# Patient Record
Sex: FEMALE | Race: WHITE | NOT HISPANIC OR LATINO | Employment: FULL TIME
[De-identification: names, ages, dates, MRNs, and addresses within clinical notes are randomized per-mention and may not be internally consistent; named-entity substitution may affect disease eponyms.]

---

## 2017-09-24 ENCOUNTER — HEALTH MAINTENANCE LETTER (OUTPATIENT)
Age: 57
End: 2017-09-24

## 2021-05-24 ENCOUNTER — RECORDS - HEALTHEAST (OUTPATIENT)
Dept: ADMINISTRATIVE | Facility: CLINIC | Age: 61
End: 2021-05-24

## 2021-05-30 ENCOUNTER — RECORDS - HEALTHEAST (OUTPATIENT)
Dept: ADMINISTRATIVE | Facility: CLINIC | Age: 61
End: 2021-05-30

## 2022-09-29 ENCOUNTER — APPOINTMENT (OUTPATIENT)
Dept: ULTRASOUND IMAGING | Facility: HOSPITAL | Age: 62
End: 2022-09-29
Attending: EMERGENCY MEDICINE
Payer: COMMERCIAL

## 2022-09-29 ENCOUNTER — HOSPITAL ENCOUNTER (OUTPATIENT)
Facility: HOSPITAL | Age: 62
Setting detail: OBSERVATION
Discharge: HOME | End: 2022-10-03
Attending: EMERGENCY MEDICINE | Admitting: INTERNAL MEDICINE
Payer: COMMERCIAL

## 2022-09-29 ENCOUNTER — APPOINTMENT (OUTPATIENT)
Dept: MRI IMAGING | Facility: HOSPITAL | Age: 62
End: 2022-09-29
Attending: EMERGENCY MEDICINE
Payer: COMMERCIAL

## 2022-09-29 DIAGNOSIS — S70.12XA HEMATOMA OF LEFT THIGH, INITIAL ENCOUNTER: ICD-10-CM

## 2022-09-29 LAB
ANION GAP SERPL CALCULATED.3IONS-SCNC: 13 MMOL/L (ref 7–15)
APTT PPP: 32 SECONDS (ref 22–38)
BASOPHILS # BLD AUTO: 0 10E3/UL (ref 0–0.2)
BASOPHILS NFR BLD AUTO: 0 %
BUN SERPL-MCNC: 6 MG/DL (ref 8–23)
CALCIUM SERPL-MCNC: 8.9 MG/DL (ref 8.8–10.2)
CHLORIDE SERPL-SCNC: 92 MMOL/L (ref 98–107)
CK SERPL-CCNC: 86 U/L (ref 26–192)
CREAT SERPL-MCNC: 0.43 MG/DL (ref 0.51–0.95)
CRP SERPL-MCNC: 6.3 MG/L
DEPRECATED HCO3 PLAS-SCNC: 25 MMOL/L (ref 22–29)
EOSINOPHIL # BLD AUTO: 0.1 10E3/UL (ref 0–0.7)
EOSINOPHIL NFR BLD AUTO: 1 %
ERYTHROCYTE [DISTWIDTH] IN BLOOD BY AUTOMATED COUNT: 12.3 % (ref 10–15)
GFR SERPL CREATININE-BSD FRML MDRD: >90 ML/MIN/1.73M2
GLUCOSE SERPL-MCNC: 97 MG/DL (ref 70–99)
HCT VFR BLD AUTO: 37.8 % (ref 35–47)
HGB BLD-MCNC: 12.5 G/DL (ref 11.7–15.7)
HGB BLD-MCNC: 13.2 G/DL (ref 11.7–15.7)
HOLD SPECIMEN: NORMAL
IMM GRANULOCYTES # BLD: 0.1 10E3/UL
IMM GRANULOCYTES NFR BLD: 1 %
INR PPP: 1.11 (ref 0.85–1.15)
LYMPHOCYTES # BLD AUTO: 1.7 10E3/UL (ref 0.8–5.3)
LYMPHOCYTES NFR BLD AUTO: 15 %
MCH RBC QN AUTO: 31.7 PG (ref 26.5–33)
MCHC RBC AUTO-ENTMCNC: 34.9 G/DL (ref 31.5–36.5)
MCV RBC AUTO: 91 FL (ref 78–100)
MONOCYTES # BLD AUTO: 0.7 10E3/UL (ref 0–1.3)
MONOCYTES NFR BLD AUTO: 7 %
NEUTROPHILS # BLD AUTO: 8.7 10E3/UL (ref 1.6–8.3)
NEUTROPHILS NFR BLD AUTO: 76 %
NRBC # BLD AUTO: 0 10E3/UL
NRBC BLD AUTO-RTO: 0 /100
PLATELET # BLD AUTO: 334 10E3/UL (ref 150–450)
POTASSIUM SERPL-SCNC: 3.8 MMOL/L (ref 3.4–5.3)
RBC # BLD AUTO: 4.17 10E6/UL (ref 3.8–5.2)
SODIUM SERPL-SCNC: 130 MMOL/L (ref 136–145)
WBC # BLD AUTO: 11.1 10E3/UL (ref 4–11)

## 2022-09-29 PROCEDURE — G0378 HOSPITAL OBSERVATION PER HR: HCPCS

## 2022-09-29 PROCEDURE — 82550 ASSAY OF CK (CPK): CPT | Performed by: EMERGENCY MEDICINE

## 2022-09-29 PROCEDURE — 36415 COLL VENOUS BLD VENIPUNCTURE: CPT | Performed by: EMERGENCY MEDICINE

## 2022-09-29 PROCEDURE — 99220 PR INITIAL OBSERVATION CARE,LEVEL III: CPT | Performed by: INTERNAL MEDICINE

## 2022-09-29 PROCEDURE — C9803 HOPD COVID-19 SPEC COLLECT: HCPCS

## 2022-09-29 PROCEDURE — 85018 HEMOGLOBIN: CPT | Performed by: EMERGENCY MEDICINE

## 2022-09-29 PROCEDURE — 85014 HEMATOCRIT: CPT | Performed by: EMERGENCY MEDICINE

## 2022-09-29 PROCEDURE — 80048 BASIC METABOLIC PNL TOTAL CA: CPT | Performed by: EMERGENCY MEDICINE

## 2022-09-29 PROCEDURE — U0003 INFECTIOUS AGENT DETECTION BY NUCLEIC ACID (DNA OR RNA); SEVERE ACUTE RESPIRATORY SYNDROME CORONAVIRUS 2 (SARS-COV-2) (CORONAVIRUS DISEASE [COVID-19]), AMPLIFIED PROBE TECHNIQUE, MAKING USE OF HIGH THROUGHPUT TECHNOLOGIES AS DESCRIBED BY CMS-2020-01-R: HCPCS | Performed by: EMERGENCY MEDICINE

## 2022-09-29 PROCEDURE — 73720 MRI LWR EXTREMITY W/O&W/DYE: CPT | Mod: LT

## 2022-09-29 PROCEDURE — 85730 THROMBOPLASTIN TIME PARTIAL: CPT | Performed by: EMERGENCY MEDICINE

## 2022-09-29 PROCEDURE — 255N000002 HC RX 255 OP 636: Performed by: EMERGENCY MEDICINE

## 2022-09-29 PROCEDURE — 93971 EXTREMITY STUDY: CPT | Mod: LT

## 2022-09-29 PROCEDURE — 86140 C-REACTIVE PROTEIN: CPT | Performed by: EMERGENCY MEDICINE

## 2022-09-29 PROCEDURE — 85610 PROTHROMBIN TIME: CPT | Performed by: EMERGENCY MEDICINE

## 2022-09-29 PROCEDURE — 250N000013 HC RX MED GY IP 250 OP 250 PS 637: Performed by: EMERGENCY MEDICINE

## 2022-09-29 PROCEDURE — 99285 EMERGENCY DEPT VISIT HI MDM: CPT | Mod: 25

## 2022-09-29 PROCEDURE — 250N000011 HC RX IP 250 OP 636: Performed by: EMERGENCY MEDICINE

## 2022-09-29 PROCEDURE — 96374 THER/PROPH/DIAG INJ IV PUSH: CPT | Mod: XU

## 2022-09-29 PROCEDURE — A9585 GADOBUTROL INJECTION: HCPCS | Performed by: EMERGENCY MEDICINE

## 2022-09-29 RX ORDER — ASPIRIN 81 MG/1
81 TABLET ORAL DAILY
COMMUNITY

## 2022-09-29 RX ORDER — AMOXICILLIN 250 MG
2 CAPSULE ORAL 2 TIMES DAILY
Status: DISCONTINUED | OUTPATIENT
Start: 2022-09-30 | End: 2022-10-03

## 2022-09-29 RX ORDER — AMOXICILLIN 250 MG
1 CAPSULE ORAL 2 TIMES DAILY
Status: DISCONTINUED | OUTPATIENT
Start: 2022-09-30 | End: 2022-10-03 | Stop reason: HOSPADM

## 2022-09-29 RX ORDER — HYDROMORPHONE HYDROCHLORIDE 1 MG/ML
0.2 INJECTION, SOLUTION INTRAMUSCULAR; INTRAVENOUS; SUBCUTANEOUS EVERY 4 HOURS PRN
Status: DISCONTINUED | OUTPATIENT
Start: 2022-09-29 | End: 2022-10-01

## 2022-09-29 RX ORDER — SODIUM CHLORIDE 9 MG/ML
INJECTION, SOLUTION INTRAVENOUS CONTINUOUS
Status: DISCONTINUED | OUTPATIENT
Start: 2022-09-30 | End: 2022-10-03

## 2022-09-29 RX ORDER — GADOBUTROL 604.72 MG/ML
7 INJECTION INTRAVENOUS ONCE
Status: COMPLETED | OUTPATIENT
Start: 2022-09-29 | End: 2022-09-29

## 2022-09-29 RX ORDER — OXYCODONE HYDROCHLORIDE 5 MG/1
5 TABLET ORAL ONCE
Status: COMPLETED | OUTPATIENT
Start: 2022-09-29 | End: 2022-09-29

## 2022-09-29 RX ORDER — MORPHINE SULFATE 4 MG/ML
4 INJECTION, SOLUTION INTRAMUSCULAR; INTRAVENOUS ONCE
Status: COMPLETED | OUTPATIENT
Start: 2022-09-29 | End: 2022-09-29

## 2022-09-29 RX ORDER — IBUPROFEN 200 MG
600 TABLET ORAL EVERY 8 HOURS PRN
Status: ON HOLD | COMMUNITY
End: 2022-10-03

## 2022-09-29 RX ORDER — OXYCODONE HYDROCHLORIDE 5 MG/1
5 TABLET ORAL EVERY 6 HOURS PRN
Status: DISCONTINUED | OUTPATIENT
Start: 2022-09-29 | End: 2022-10-03 | Stop reason: HOSPADM

## 2022-09-29 RX ORDER — ONDANSETRON 2 MG/ML
4 INJECTION INTRAMUSCULAR; INTRAVENOUS EVERY 6 HOURS PRN
Status: DISCONTINUED | OUTPATIENT
Start: 2022-09-29 | End: 2022-10-03

## 2022-09-29 RX ORDER — ACETAMINOPHEN 325 MG/1
975 TABLET ORAL EVERY 8 HOURS
Status: DISCONTINUED | OUTPATIENT
Start: 2022-09-30 | End: 2022-10-03 | Stop reason: HOSPADM

## 2022-09-29 RX ORDER — ONDANSETRON 4 MG/1
4 TABLET, ORALLY DISINTEGRATING ORAL EVERY 6 HOURS PRN
Status: DISCONTINUED | OUTPATIENT
Start: 2022-09-29 | End: 2022-10-03 | Stop reason: HOSPADM

## 2022-09-29 RX ORDER — DIAZEPAM 5 MG
5 TABLET ORAL ONCE
Status: COMPLETED | OUTPATIENT
Start: 2022-09-29 | End: 2022-09-29

## 2022-09-29 RX ADMIN — DIAZEPAM 5 MG: 5 TABLET ORAL at 21:04

## 2022-09-29 RX ADMIN — OXYCODONE HYDROCHLORIDE 5 MG: 5 TABLET ORAL at 20:22

## 2022-09-29 RX ADMIN — MORPHINE SULFATE 4 MG: 4 INJECTION INTRAVENOUS at 23:25

## 2022-09-29 RX ADMIN — GADOBUTROL 7 ML: 604.72 INJECTION INTRAVENOUS at 21:38

## 2022-09-29 NOTE — ED NOTES
"    ED Provider In Triage Note  [facility]  Encounter Date: Sep 29, 2022    Chief Complaint   Patient presents with     Leg Pain     Leg swelling         Leg Swelling         Use of Intrepreter: N/A    Brief HPI:   Tierney Pearson is a 62 year old female presenting to the Emergency Department with a chief complaint of left lateral thigh pain and swelling.     Pt is concerned for DVT. Denies h/o DVT.    Pain started yesterday and reports swelling and redness on left lateral thigh and redness spreading medially.    \"spider bite\" to this area recently.        Brief Physical Exam:  BP (!) 180/111   Pulse 88   Temp 97.7  F (36.5  C) (Temporal)   Resp 20   Ht 1.702 m (5' 7\")   Wt 65.8 kg (145 lb)   SpO2 100%   BMI 22.71 kg/m    General: Non-toxic appearing  HEENT: Atraumatic  Resp: No respiratory distress  Abdomen: Non-peritoneal  Neuro: Alert, oriented, answers questions appropriately  Psych: Behavior appropriate  Musculoskeletal: unable to visualize area in triage room (clothing too tight) but tenderness on lateral thigh      Plan Initiated in Triage:  Orders Placed This Encounter     [place] Draw     Basic metabolic panel     CRP inflammation     Unable to examine due to patient tight clothing in triage but suspect cellulitis based on history. Less likely DVT due to lateral thigh and not medially on report.      PIT Dispo:   Return to lobby while awaiting workup and ED bed availability          Adriana Gayle MD on 9/29/2022 at 5:47 PM        Patient was evaluated by the Physician in Triage due to a limitation of available rooms in the Emergency Department. A plan of care was discussed based on the information obtained on the initial evaluation and patient was counseled to return back to the Emergency Department lobby after this initial evalutaiton until results were obtained or a room became available in the Emergency Department. Patient was counseled not to leave prior to " receiving the results of their workup.            Adriana Gayle MD  09/29/22 8621

## 2022-09-29 NOTE — ED TRIAGE NOTES
Pt reports redness and swelling to L lateral thigh that started yesterday.  Today much more painful and swollen. Pt denies hx of blood clots, denies any long trips or periods of inactivity.     Triage Assessment     Row Name 09/29/22 0419       Triage Assessment (Adult)    Airway WDL WDL       Respiratory WDL    Respiratory WDL WDL       Skin Circulation/Temperature WDL    Skin Circulation/Temperature WDL WDL       Cardiac WDL    Cardiac WDL WDL       Peripheral/Neurovascular WDL    Peripheral Neurovascular WDL WDL       Cognitive/Neuro/Behavioral WDL    Cognitive/Neuro/Behavioral WDL WDL

## 2022-09-29 NOTE — LETTER
[address] [address]  Phone: [phone]  Fax: [phone]    October 3, 2022        Tierney Pearson  [address] [place] [address]    Pt was admitted to the hospital @  [facility] with medical condition between 9/29 and 10/3       To whom it may concern:    RE: Tierney Pearson    Patient may return to work with   {No Restrictions )  Please contact me for questions or concerns.      Sincerely,    Isaac Gonzalez MD     No name on file.

## 2022-09-30 ENCOUNTER — APPOINTMENT (OUTPATIENT)
Dept: CARDIOLOGY | Facility: HOSPITAL | Age: 62
End: 2022-09-30
Attending: INTERNAL MEDICINE
Payer: COMMERCIAL

## 2022-09-30 ENCOUNTER — APPOINTMENT (OUTPATIENT)
Dept: PHYSICAL THERAPY | Facility: HOSPITAL | Age: 62
End: 2022-09-30
Attending: INTERNAL MEDICINE
Payer: COMMERCIAL

## 2022-09-30 LAB
ALBUMIN SERPL BCG-MCNC: 4.1 G/DL (ref 3.5–5.2)
ALP SERPL-CCNC: 62 U/L (ref 35–104)
ALT SERPL W P-5'-P-CCNC: 13 U/L (ref 10–35)
AMPHETAMINES UR QL SCN: ABNORMAL
ANION GAP SERPL CALCULATED.3IONS-SCNC: 11 MMOL/L (ref 7–15)
AST SERPL W P-5'-P-CCNC: 21 U/L (ref 10–35)
BARBITURATES UR QL SCN: ABNORMAL
BENZODIAZ UR QL SCN: ABNORMAL
BILIRUB SERPL-MCNC: 1.1 MG/DL
BUN SERPL-MCNC: 4.5 MG/DL (ref 8–23)
BZE UR QL SCN: ABNORMAL
CALCIUM SERPL-MCNC: 8.6 MG/DL (ref 8.8–10.2)
CANNABINOIDS UR QL SCN: ABNORMAL
CHLORIDE SERPL-SCNC: 93 MMOL/L (ref 98–107)
CREAT SERPL-MCNC: 0.36 MG/DL (ref 0.51–0.95)
DEPRECATED HCO3 PLAS-SCNC: 24 MMOL/L (ref 22–29)
ERYTHROCYTE [DISTWIDTH] IN BLOOD BY AUTOMATED COUNT: 12.4 % (ref 10–15)
GFR SERPL CREATININE-BSD FRML MDRD: >90 ML/MIN/1.73M2
GLUCOSE SERPL-MCNC: 99 MG/DL (ref 70–99)
HCT VFR BLD AUTO: 34.1 % (ref 35–47)
HGB BLD-MCNC: 12.3 G/DL (ref 11.7–15.7)
MCH RBC QN AUTO: 32.8 PG (ref 26.5–33)
MCHC RBC AUTO-ENTMCNC: 36.1 G/DL (ref 31.5–36.5)
MCV RBC AUTO: 91 FL (ref 78–100)
OPIATES UR QL SCN: ABNORMAL
PCP QUAL URINE (ROCHE): ABNORMAL
PLATELET # BLD AUTO: 335 10E3/UL (ref 150–450)
POTASSIUM SERPL-SCNC: 3.3 MMOL/L (ref 3.4–5.3)
PROT SERPL-MCNC: 6.7 G/DL (ref 6.4–8.3)
RBC # BLD AUTO: 3.75 10E6/UL (ref 3.8–5.2)
SARS-COV-2 RNA RESP QL NAA+PROBE: NEGATIVE
SODIUM SERPL-SCNC: 128 MMOL/L (ref 136–145)
WBC # BLD AUTO: 9.5 10E3/UL (ref 4–11)

## 2022-09-30 PROCEDURE — G0378 HOSPITAL OBSERVATION PER HR: HCPCS

## 2022-09-30 PROCEDURE — 96361 HYDRATE IV INFUSION ADD-ON: CPT

## 2022-09-30 PROCEDURE — 258N000003 HC RX IP 258 OP 636: Performed by: INTERNAL MEDICINE

## 2022-09-30 PROCEDURE — 80307 DRUG TEST PRSMV CHEM ANLYZR: CPT | Performed by: INTERNAL MEDICINE

## 2022-09-30 PROCEDURE — 250N000013 HC RX MED GY IP 250 OP 250 PS 637: Performed by: INTERNAL MEDICINE

## 2022-09-30 PROCEDURE — 97116 GAIT TRAINING THERAPY: CPT | Mod: GP

## 2022-09-30 PROCEDURE — 93306 TTE W/DOPPLER COMPLETE: CPT | Mod: 26 | Performed by: INTERNAL MEDICINE

## 2022-09-30 PROCEDURE — 96375 TX/PRO/DX INJ NEW DRUG ADDON: CPT

## 2022-09-30 PROCEDURE — 97530 THERAPEUTIC ACTIVITIES: CPT | Mod: GP

## 2022-09-30 PROCEDURE — 97161 PT EVAL LOW COMPLEX 20 MIN: CPT | Mod: GP

## 2022-09-30 PROCEDURE — 80053 COMPREHEN METABOLIC PANEL: CPT | Performed by: INTERNAL MEDICINE

## 2022-09-30 PROCEDURE — 250N000011 HC RX IP 250 OP 636: Performed by: INTERNAL MEDICINE

## 2022-09-30 PROCEDURE — 93306 TTE W/DOPPLER COMPLETE: CPT

## 2022-09-30 PROCEDURE — 82040 ASSAY OF SERUM ALBUMIN: CPT | Performed by: INTERNAL MEDICINE

## 2022-09-30 PROCEDURE — 96376 TX/PRO/DX INJ SAME DRUG ADON: CPT

## 2022-09-30 PROCEDURE — 36415 COLL VENOUS BLD VENIPUNCTURE: CPT | Performed by: INTERNAL MEDICINE

## 2022-09-30 PROCEDURE — 85027 COMPLETE CBC AUTOMATED: CPT | Performed by: INTERNAL MEDICINE

## 2022-09-30 PROCEDURE — 99225 PR SUBSEQUENT OBSERVATION CARE,LEVEL II: CPT | Performed by: INTERNAL MEDICINE

## 2022-09-30 RX ORDER — AMLODIPINE BESYLATE 2.5 MG/1
2.5 TABLET ORAL DAILY
Status: DISCONTINUED | OUTPATIENT
Start: 2022-09-30 | End: 2022-10-03 | Stop reason: HOSPADM

## 2022-09-30 RX ORDER — HYDROXYZINE HYDROCHLORIDE 25 MG/1
25 TABLET, FILM COATED ORAL EVERY 6 HOURS PRN
Status: DISCONTINUED | OUTPATIENT
Start: 2022-09-30 | End: 2022-10-03

## 2022-09-30 RX ORDER — NICOTINE 21 MG/24HR
1 PATCH, TRANSDERMAL 24 HOURS TRANSDERMAL DAILY PRN
Status: DISCONTINUED | OUTPATIENT
Start: 2022-09-30 | End: 2022-10-03 | Stop reason: HOSPADM

## 2022-09-30 RX ORDER — HYDRALAZINE HYDROCHLORIDE 20 MG/ML
10 INJECTION INTRAMUSCULAR; INTRAVENOUS EVERY 6 HOURS PRN
Status: DISCONTINUED | OUTPATIENT
Start: 2022-09-30 | End: 2022-10-03

## 2022-09-30 RX ADMIN — ACETAMINOPHEN 975 MG: 325 TABLET, FILM COATED ORAL at 07:55

## 2022-09-30 RX ADMIN — OXYCODONE HYDROCHLORIDE 5 MG: 5 TABLET ORAL at 15:35

## 2022-09-30 RX ADMIN — HYDROMORPHONE HYDROCHLORIDE 0.2 MG: 1 INJECTION, SOLUTION INTRAMUSCULAR; INTRAVENOUS; SUBCUTANEOUS at 05:18

## 2022-09-30 RX ADMIN — ACETAMINOPHEN 975 MG: 325 TABLET, FILM COATED ORAL at 01:31

## 2022-09-30 RX ADMIN — HYDRALAZINE HYDROCHLORIDE 10 MG: 20 INJECTION, SOLUTION INTRAMUSCULAR; INTRAVENOUS at 01:31

## 2022-09-30 RX ADMIN — SENNOSIDES AND DOCUSATE SODIUM 1 TABLET: 50; 8.6 TABLET ORAL at 07:49

## 2022-09-30 RX ADMIN — SODIUM CHLORIDE: 9 INJECTION, SOLUTION INTRAVENOUS at 01:31

## 2022-09-30 RX ADMIN — ACETAMINOPHEN 975 MG: 325 TABLET, FILM COATED ORAL at 16:00

## 2022-09-30 RX ADMIN — SENNOSIDES AND DOCUSATE SODIUM 1 TABLET: 50; 8.6 TABLET ORAL at 20:41

## 2022-09-30 RX ADMIN — HYDROMORPHONE HYDROCHLORIDE 0.2 MG: 1 INJECTION, SOLUTION INTRAMUSCULAR; INTRAVENOUS; SUBCUTANEOUS at 12:37

## 2022-09-30 RX ADMIN — OXYCODONE HYDROCHLORIDE 5 MG: 5 TABLET ORAL at 07:45

## 2022-09-30 RX ADMIN — HYDROMORPHONE HYDROCHLORIDE 0.2 MG: 1 INJECTION, SOLUTION INTRAMUSCULAR; INTRAVENOUS; SUBCUTANEOUS at 20:41

## 2022-09-30 RX ADMIN — AMLODIPINE BESYLATE 2.5 MG: 2.5 TABLET ORAL at 07:48

## 2022-09-30 ASSESSMENT — ACTIVITIES OF DAILY LIVING (ADL)
ADLS_ACUITY_SCORE: 35
ADLS_ACUITY_SCORE: 36
ADLS_ACUITY_SCORE: 39
ADLS_ACUITY_SCORE: 35
ADLS_ACUITY_SCORE: 39
ADLS_ACUITY_SCORE: 37
ADLS_ACUITY_SCORE: 37
ADLS_ACUITY_SCORE: 35
DEPENDENT_IADLS:: INDEPENDENT
ADLS_ACUITY_SCORE: 39
ADLS_ACUITY_SCORE: 35
ADLS_ACUITY_SCORE: 37
ADLS_ACUITY_SCORE: 35

## 2022-09-30 NOTE — PROGRESS NOTES
[facility]      OUTPATIENT PHYSICAL THERAPY EVALUATION  PLAN OF TREATMENT FOR OUTPATIENT REHABILITATION  (COMPLETE FOR INITIAL CLAIMS ONLY)  Patient's Last Name, First Name, M.I.  YOB: 1960  Tierney Pearson                        Provider's Name  [facility] Medical Record No.  [id number]                               Onset Date:  09/29/22   Start of Care Date:         Type:     _X_PT   ___OT   ___SLP Medical Diagnosis:                           PT Diagnosis:      Visits from SOC:  1   _________________________________________________________________________________  Plan of Treatment/Functional Goals    Planned Interventions:       Goals: See Physical Therapy Goals on Care Plan in [employer] electronic health record.    Therapy Frequency: Daily  Predicted Duration of Therapy Intervention: 10/07/22  _________________________________________________________________________________    I CERTIFY THE NEED FOR THESE SERVICES FURNISHED UNDER        THIS PLAN OF TREATMENT AND WHILE UNDER MY CARE     (Physician co-signature of this document indicates review and certification of the therapy plan).              Certification date from: (P) 09/30/22,      Referring Physician: Dr Conrad            Initial Assessment        See Physical Therapy evaluation dated   in Epic electronic health record.

## 2022-09-30 NOTE — PROGRESS NOTES
[facility]    Medicine Progress Note - Hospitalist Service    Date of Admission:  9/29/2022    Assessment & Plan          62 year old female with past medical history of hypertension, alcohol and smoking abuse who presents to the ED for the evaluation of leg swelling.  She was admitted with,     #Acute left thigh hematoma  -- Etiology is unclear, patient is not sure about trauma.  -- MRI of leg shows a large mass in the vastus intermedius muscle suggestive of acute hematoma with active extravasation  -- IR provider was contacted by ER physician and not recommending any intervention at this point, if hemoglobin is dropping then would order CTA and notify IR.  -- Discontinue home aspirin  -- No NSAIDs  -- Continue to monitor hemoglobin     #Left leg swelling  -- Secondary to hematoma  -- Negative ultrasound for DVT  -- Orthopedic was contacted by ER provider regarding concern for compartment syndrome who recommended continued observation and check CK level and use ice packs.     #Accelerated hypertension  -- Patient has history of hypertension but not currently taking any medication  -- Increase blood pressure in the ER, probably secondary to pain  -- Start Norvasc   -- Check echo  -- Add hydralazine IV as needed  -- Continue to monitor blood pressure     #Left leg pain  -- Secondary to acute hematoma  -- Avoid NSAIDs because of bleeding  -- Start scheduled Tylenol 3 times daily  -- Ice packing  -- Add oxycodone as needed  -- PT evaluation     #Hyponatremia  -- Probably secondary to poor oral intake  -- Gentle IV hydration overnight  -- Continue to monitor sodium level     #Smoking dependence  -- Discussed with patient to quit  -- Nicotine patch as needed     #Alcohol abuse  -- Patient drinks 3-4 beers every day  -- No sign or symptom of alcohol withdrawal  -- Continue to monitor closely        Diet: Regular Diet Adult    DVT Prophylaxis: Pneumatic Compression Devices  Teixeira Catheter: Not  present  Central Lines: None  Cardiac Monitoring: None  Code Status: Full Code      Disposition Plan      Expected Discharge Date: 09/30/2022                The patient's care was discussed with the Bedside Nurse and Patient.    Randell Vasquez DO  Hospitalist Service  [facility]  Securely message with the Vocera Web Console (learn more here)  Text page via [employer] Paging/Directory         Clinically Significant Risk Factors Present on Admission         # Hyponatremia: Na = 128 mmol/L (Ref range: 136 - 145 mmol/L) on admission, will monitor as appropriate                  ______________________________________________________________________    Interval History   Patient is still reporting persistent pain and swelling in the left thigh.  Patient will talk to  about readiness to come home.  Expressed to patient she is welcomed to stay overnight if not strong enough to return home.  Continue to monitor sxs compartment syndrome.  Continue to monitor CBC for acute blood loss.    Data reviewed today: I reviewed all medications, new labs and imaging results over the last 24 hours. I personally reviewed no images or EKG's today.    Physical Exam   Vital Signs: Temp: 97.8  F (36.6  C) Temp src: Oral BP: (!) 165/87 Pulse: 98   Resp: 18 SpO2: 95 % O2 Device: None (Room air)    Weight: 145 lbs 0 oz     GENERAL: Alert and oriented x 3; no acute distress; well-nourished.  HEENT: Normocephalic; atraumatic; PERRLA; MMM.  CV: RRR; normal S1, S2; no rubs, murmurs, or gallops.  RESP: Lung fields clear to aucultation B/L; no wheezing or crepitations.  GI: Abdomen is soft, nontender, nondistended; no organomegaly; normal bowel sounds.  : Deferred genital examination.   MSK: Left thigh has increased circumference as compared to right.  DERM: Skin is intact; no rash, lesions, or skin breakdown.  NEURO: No focal deficits appreciated; strength & sensorium are grossly intact.  PSYCH: No active hallucinations;  affect, insight appear within normal limits.    Data   Recent Labs   Lab 09/30/22  1050 09/29/22  2333 09/29/22  2235 09/29/22  1756   WBC 9.5  --   --  11.1*   HGB 12.3 12.5  --  13.2   MCV 91  --   --  91     --   --  334   INR  --   --  1.11  --    *  --   --  130*   POTASSIUM 3.3*  --   --  3.8   CHLORIDE 93*  --   --  92*   CO2 24  --   --  25   BUN 4.5*  --   --  6.0*   CR 0.36*  --   --  0.43*   ANIONGAP 11  --   --  13   NAEEM 8.6*  --   --  8.9   GLC 99  --   --  97   ALBUMIN 4.1  --   --   --    PROTTOTAL 6.7  --   --   --    BILITOTAL 1.1  --   --   --    ALKPHOS 62  --   --   --    ALT 13  --   --   --    AST 21  --   --   --      Recent Results (from the past 24 hour(s))   US Lower Extremity Venous Duplex Left    Narrative    EXAM: US LOWER EXTREMITY VENOUS DUPLEX LEFT  LOCATION: [facility]  DATE/TIME: 9/29/2022 8:08 PM    INDICATION: Lower extremity swelling.  COMPARISON: 02/25/2009.  TECHNIQUE: Venous Duplex ultrasound of the left lower extremity with and without compression, augmentation and duplex. Color flow and spectral Doppler with waveform analysis performed.    FINDINGS: Exam includes the common femoral, femoral, popliteal, and contralateral common femoral veins as well as segmentally visualized deep calf veins and greater saphenous vein.     LEFT: No deep vein thrombosis. No superficial thrombophlebitis.     There is a heterogeneous, encapsulated mass within the deep soft tissues of the lateral left thigh, patient's site of pain. There is a scant amount of detectable internal vascularity. Several small fluid fluid levels are additionally noted. This mass   measures approximately 7.8 x 6.8 x 3.8 cm.      Impression    IMPRESSION:  1.  No deep venous thrombosis in the left lower extremity.  2.  Indeterminate, heterogeneous mass within the deep soft tissues of the lateral left thigh, with scant detectable internal vascularity. A hematoma is favored, though a  neoplastic lesion cannot be excluded, given the presence of some detectable internal   vascularity. MR imaging of the left thigh, with and without intravenous contrast, is recommended for definitive characterization.   MR Femur Thigh Left wo & w Contrast    Narrative    EXAM: MR FEMUR THIGH LEFT W/O and W CONTRAST  LOCATION: [facility]  DATE/TIME: 9/29/2022 9:54 PM    INDICATION: Thigh pain and swelling.  COMPARISON: 9/29/2022 ultrasound.  TECHNIQUE: Routine. Additional postgadolinium T1 sequences were obtained.  IV CONTRAST: 7 mL Gadavist    FINDINGS:     JOINTS AND BONES:  -No fracture or bone contusion. Normal articular cartilage. No effusion. No marrow replacing lesion.    TENDONS:  -No tendon tear, tendinopathy, or tenosynovitis.    MUSCLES AND SOFT TISSUES:  -There is a large, heterogeneous signal intensity mass centered in the midportion of the thigh, within the vastus intermedius muscle with extensive surrounding soft tissue edema throughout the vastus musculature with anterior displacement of the rectus   femoris and some associated fascial edema involving the deep portion of the sartorius as well as the anterior margins of the short adductors. There is a somewhat linear area of increased signal intensity along the lateral margin of this collection; which   likely represents an area of active extravasation in the setting of an underlying hematoma rather than true internal enhancement as seen on series 8, image 36. This collection demonstrates some areas of mildly increased T1 signal suggesting it   represents hematoma. Overall, the collection measures approximately 6.0 cm transverse by 4.5 cm AP by approximately 7.5 cm craniocaudal. The associated surrounding edema extends cephalad to almost the level of the hip caudally to almost the level of the   knee. No muscle atrophy.      Impression    IMPRESSION:  1.  Large mass-like area in the midportion of the left thigh centered within  the vastus intermedius muscle most suggestive of an area of acute hematoma including an area of presumed active extravasation along the lateral margin of the mass. There is   extensive surrounding intra and perimuscular/fascial edema extending nearly the entire length of the thigh. The extent of the soft tissue changes could obscure an underlying small mass that has bled and follow-up MRI would be helpful.   Echocardiogram Complete    Narrative    [id number]  [id number]  [id number]  [id number]^ISIS^[place] [address]     Name: NILE RAMOS  MRN: [id number]  : 1960  Study Date: 2022 07:58 AM  Age: 62 yrs  Gender: Female  Patient Location: [facility]  Reason For Study: Hypertension (HTN)  Ordering Physician: MACY MARINELLI  Performed By: CM     BSA: 1.8 m2  Height: 67 in  Weight: 145 lb  HR: 71  ______________________________________________________________________________  Procedure  Complete Echo Adult.  ______________________________________________________________________________  Interpretation Summary     1. Normal left ventricular size and systolic performance with a visually  estimated ejection fraction of 60-65%.  2. No significant valvular heart disease is identified on this study.  3. Normal right ventricular size and systolic performance.  ______________________________________________________________________________  Left ventricle:  Normal left ventricular size and systolic performance with a visually  estimated ejection fraction of 60-65%. There is normal regional wall motion.  There is borderline increase in left ventricular wall thickness.     Assessment of LV Diastolic Function: The cumulative findings are indeterminate  in the evaluation of diastolic function [The septal e' velocity is < 7 cm/s &  lateral e' velocity is < 10 cm/s. The average E/e' is >14. The TR velocity  cannot be determined due to insufficient tricuspid insufficiency signal.  Left  atrial volume index is less than 34 mL/mÂ ].     Right ventricle:  Normal right ventricular size and systolic performance.     Left atrium:  The left atrium is of normal size.     Right atrium:  The right atrium is of normal size.     IVC:  The IVC is of normal caliber.     Aortic valve:  The aortic valve is comprised of three cusps. No significant aortic stenosis  or aortic insufficiency is detected on this study.     Mitral valve:  The mitral valve appears morphologically normal. There is trace mitral  insufficiency.     Tricuspid valve:  The tricuspid valve is grossly morphologically normal. There is trace  tricuspid insufficiency.     Pulmonic valve:  The pulmonic valve is grossly morphologically normal.     Thoracic aorta:  There is borderline enlargement of the proximal ascending aorta.     Pericardium:  There is no significant pericardial effusion.  ______________________________________________________________________________  ______________________________________________________________________________  MMode/2D Measurements & Calculations  IVSd: 1.2 cm  LVIDd: 5.2 cm  LVIDs: 3.9 cm  LVPWd: 1.0 cm  FS: 25.8 %  LV mass(C)d: 229.3 grams  LV mass(C)dI: 130.0 grams/m2  Ao root diam: 3.2 cm  LA dimension: 3.7 cm  asc Aorta Diam: 3.8 cm  LA/Ao: 1.2  LVOT diam: 2.4 cm  LVOT area: 4.6 cm2     LA Volume Indexed (AL/bp): 31.4 ml/m2  RWT: 0.40     Doppler Measurements & Calculations  MV E max alex: 81.4 cm/sec  MV A max alex: 140.8 cm/sec  MV E/A: 0.58  MV max P.3 mmHg  MV mean PG: 3.5 mmHg  MV V2 VTI: 36.5 cm  MVA(VTI): 3.0 cm2  MV dec slope: 248.6 cm/sec2  MV dec time: 0.33 sec  Ao V2 max: 142.5 cm/sec  Ao max P.0 mmHg  Ao V2 mean: 98.5 cm/sec  Ao mean P.5 mmHg  Ao V2 VTI: 32.6 cm  JOÃO(I,D): 3.4 cm2  JOÃO(V,D): 3.3 cm2  LV V1 max P.3 mmHg  LV V1 max: 103.4 cm/sec  LV V1 VTI: 24.1 cm  SV(LVOT): 110.6 ml  SI(LVOT): 62.7 ml/m2  PA acc time: 0.10 sec  AV Alex Ratio (DI): 0.73  JOÃO Index (cm2/m2):  1.9  E/E' avg: 15.6  Lateral E/e': 13.4  Medial E/e': 17.8     ______________________________________________________________________________  Report approved by: Kindra Walker 09/30/2022 09:01 AM           Medications     sodium chloride Stopped (09/30/22 0712)       acetaminophen  975 mg Oral Q8H     amLODIPine  2.5 mg Oral Daily     nicotine   Transdermal Q8H     senna-docusate  1 tablet Oral BID    Or     senna-docusate  2 tablet Oral BID

## 2022-09-30 NOTE — ED PROVIDER NOTES
EMERGENCY DEPARTMENT ENCOUNTER      NAME: Tierney Pearson  AGE: 62 year old female  YOB: 1960  MRN: [id number]  EVALUATION DATE & TIME: No admission date for patient encounter.    PCP: Erick Ferro    ED PROVIDER: Nichole Mc MD    Chief Complaint   Patient presents with     Leg Pain     Leg swelling         Leg Swelling         FINAL IMPRESSION:  1. Hematoma of left thigh, initial encounter          ED COURSE & MEDICAL DECISION MAKING:    Pertinent Labs & Imaging studies reviewed. (See chart for details)  62 year old female with history of HTN who presents to the Emergency Department for evaluation of atraumatic left leg pain primarily within the left thigh that started yesterday.  On exam the leg is edematous, but no erythema swelling or warmth to suspect a cellulitis.  Concern for DVT.    CBC BMP and CRP notable only for CRP elevated at 6.3.  Duplex ultrasound negative for DVT but does show either hematoma or mass within the soft tissue of the thigh, recommend MRI.  MRI was obtained and shows hematoma with active gadolinium extravasation.  Patient still quite uncomfortable after oral oxycodone, but unfortunately would not be able able to give her any IV narcotics because she has been out in triage to the boarding crisis.  I do have some morphine ordered.  Due to the active extravasation Case was discussed with IR.  They did not feel that this was something that could be embolized, and could not trust an MRI.  Recommend repeating hemoglobin and if there is a hemoglobin drop obtaining a CTA and contacting them again.  Case was discussed with orthopedic surgery given concern for pain and sizable hematoma about potential evolving compartment syndrome.  A CK for what that is worth is normal at 86.  She certainly does have pain but is not necessarily out of proportion to her exam.  Ortho recommends continuing observation, ice and elevation.  Coags added on, unremarkable.  Platelets are normal.  Again  she is not anticoagulated or on any aspirin.  Will admit for serial hemoglobin, ice elevation and observation.    7:20 PM I introduced myself to the patient, obtained patient history, performed a physical exam, and discussed plan for ED workup including potential diagnostic laboratory/imaging studies and interventions.    8:45 PM I rechecked the patient and updated them on laboratory and imaging results.    ED Course as of 09/30/22 0022   Thu Sep 29, 2022   1917 WBC(!): 11.1   1917 CRP Inflammation(!): 6.30   2218 Spoke w rad - hematoma seen on MRI   2244 Spoke w Dr. James, ortho   2304 Spoke w Dr. Ko from body IR         At the conclusion of the encounter I discussed the results of all of the tests and the disposition. The questions were answered. The patient or family acknowledged understanding and was agreeable with the care plan.    CONSULTS:  [facility] IR  Orthopedic surgery    MEDICATIONS GIVEN IN THE EMERGENCY:  Medications   melatonin tablet 1 mg (has no administration in time range)   ondansetron (ZOFRAN ODT) ODT tab 4 mg (has no administration in time range)     Or   ondansetron (ZOFRAN) injection 4 mg (has no administration in time range)   acetaminophen (TYLENOL) tablet 975 mg (has no administration in time range)   senna-docusate (SENOKOT-S/PERICOLACE) 8.6-50 MG per tablet 1 tablet (has no administration in time range)     Or   senna-docusate (SENOKOT-S/PERICOLACE) 8.6-50 MG per tablet 2 tablet (has no administration in time range)   sodium chloride 0.9% infusion (has no administration in time range)   HYDROmorphone (PF) (DILAUDID) injection 0.2 mg (has no administration in time range)   oxyCODONE (ROXICODONE) tablet 5 mg (has no administration in time range)   oxyCODONE (ROXICODONE) tablet 5 mg (5 mg Oral Given 9/29/22 2022)   diazepam (VALIUM) tablet 5 mg (5 mg Oral Given 9/29/22 2104)   gadobutrol (GADAVIST) injection 7 mL (7 mLs Intravenous Given 9/29/22 2138)   morphine (PF) injection 4 mg (4 mg  Intravenous Given 9/29/22 6606)       NEW PRESCRIPTIONS STARTED AT TODAY'S ER VISIT  New Prescriptions    No medications on file          =================================================================    HPI    Patient information was obtained from: Patient     Use of Intrepreter: N/A        Tierney Pearson is a 62 year old female with pertinent medical history of hypertension who presents to the ED via wheelchair for the evaluation of leg swelling.     The patient started having left leg swelling yesterday and said it got worse today. She said it came on suddenly. She took 3 baby Asprin for her pain and said it helped.     She denies any history of blood clots, recent travel, surgery immobilization, and any new shortness of breath or chest pain. She is not taking any daily medication and has no other health problems.       REVIEW OF SYSTEMS  Constitutional:  Denies fever, chills, weight loss or weakness  Respiratory: No SOB, wheeze or cough  Cardiovascular:  No CP, palpitations  GI:  Denies abdominal pain, nausea, vomiting, diarrhea  Musculoskeletal:  Denies any new muscle/joint pain, or loss of function. Positive for left leg swelling.   Skin:  Denies rash, pallor  Neurologic:  Denies headache, focal weakness or sensory changes  All other systems negative unless noted in HPI.      PAST MEDICAL HISTORY:  Past Medical History:   Diagnosis Date     Hypertension        PAST SURGICAL HISTORY:  Past Surgical History:   Procedure Laterality Date     NO HISTORY OF SURGERY         CURRENT MEDICATIONS:    Prior to Admission Medications   Prescriptions Last Dose Informant Patient Reported? Taking?   Loratadine-Pseudoephedrine (CLARITIN-D 12 HOUR PO) Past Month at PRN  Yes Yes   Sig: Take 1 tablet by mouth daily as needed   aspirin 81 MG EC tablet 9/29/2022 at x6 tablets spread throughout the day  Yes Yes   Sig: Take 81 mg by mouth daily   ibuprofen (ADVIL/MOTRIN) 200 MG tablet Past Month at PRN  Yes Yes   Sig: Take 600 mg  "by mouth every 8 hours as needed for mild pain      Facility-Administered Medications: None       ALLERGIES:  Allergies   Allergen Reactions     Ciprofloxacin      Rash and hives.     Seasonal Allergies      Polymyxin B Hives and Rash       FAMILY HISTORY:  Family History   Problem Relation Age of Onset     Breast Cancer Mother      Heart Disease Father         issues     Breast Cancer Sister        SOCIAL HISTORY:  Social History     Tobacco Use     Smoking status: Former Smoker     Packs/day: 0.20     Years: 30.00     Pack years: 6.00     Types: Cigarettes     Quit date: 1999     Years since quittin.7     Smokeless tobacco: Never Used   Substance Use Topics     Alcohol use: Yes     Comment: Occasional on the weekends - 12 pack     Drug use: No        VITALS:  Patient Vitals for the past 24 hrs:   BP Temp Temp src Pulse Resp SpO2 Height Weight   22 2330 -- -- -- 72 -- 100 % -- --   22 2325 (!) 257/128 -- -- 72 -- 100 % -- --   22 1748 (!) 180/111 97.7  F (36.5  C) Temporal 88 20 100 % 1.702 m (5' 7\") 65.8 kg (145 lb)       PHYSICAL EXAM    General Appearance: Well-appearing, well-nourished, uncomfortable.  Head:  Normocephalic  Eyes:   conjunctiva/corneas clear  ENT:  membranes are moist without pallor  Neck:  Supple  Cardio:  Regular rate and rhythm  Pulm:  No respiratory distress  Abdomen:  Soft  Extremities:  Extremities normal, atraumatic, no cyanosis, full ROM and motor tone intact, bilateral pulses intact upper and lower. 2-3+ edema on entire left leg to groin, not pitting. No associated leg erythema or warmth.   Skin:  Skin warm, dry, no rashes  Neuro:  Alert and oriented ×3, moving all extremities, no gross sensory defects     RADIOLOGY/LABS:  Reviewed all pertinent imaging. Please see official radiology report. All pertinent labs reviewed and interpreted.    Results for orders placed or performed during the hospital encounter of 22   US Lower Extremity Venous Duplex Left    " Impression    IMPRESSION:  1.  No deep venous thrombosis in the left lower extremity.  2.  Indeterminate, heterogeneous mass within the deep soft tissues of the lateral left thigh, with scant detectable internal vascularity. A hematoma is favored, though a neoplastic lesion cannot be excluded, given the presence of some detectable internal   vascularity. MR imaging of the left thigh, with and without intravenous contrast, is recommended for definitive characterization.   MR Femur Thigh Left wo & w Contrast    Impression    IMPRESSION:  1.  Large mass-like area in the midportion of the left thigh centered within the vastus intermedius muscle most suggestive of an area of acute hematoma including an area of presumed active extravasation along the lateral margin of the mass. There is   extensive surrounding intra and perimuscular/fascial edema extending nearly the entire length of the thigh. The extent of the soft tissue changes could obscure an underlying small mass that has bled and follow-up MRI would be helpful.   Basic metabolic panel   Result Value Ref Range    Sodium 130 (L) 136 - 145 mmol/L    Potassium 3.8 3.4 - 5.3 mmol/L    Chloride 92 (L) 98 - 107 mmol/L    Carbon Dioxide (CO2) 25 22 - 29 mmol/L    Anion Gap 13 7 - 15 mmol/L    Urea Nitrogen 6.0 (L) 8.0 - 23.0 mg/dL    Creatinine 0.43 (L) 0.51 - 0.95 mg/dL    Calcium 8.9 8.8 - 10.2 mg/dL    Glucose 97 70 - 99 mg/dL    GFR Estimate >90 >60 mL/min/1.73m2   Result Value Ref Range    CRP Inflammation 6.30 (H) <5.00 mg/L   Extra Red Top Tube   Result Value Ref Range    Hold Specimen JIC    Extra Green Top (Lithium Heparin) Tube   Result Value Ref Range    Hold Specimen JIC    Extra Purple Top Tube   Result Value Ref Range    Hold Specimen JIC    CBC with platelets and differential   Result Value Ref Range    WBC Count 11.1 (H) 4.0 - 11.0 10e3/uL    RBC Count 4.17 3.80 - 5.20 10e6/uL    Hemoglobin 13.2 11.7 - 15.7 g/dL    Hematocrit 37.8 35.0 - 47.0 %    MCV 91  78 - 100 fL    MCH 31.7 26.5 - 33.0 pg    MCHC 34.9 31.5 - 36.5 g/dL    RDW 12.3 10.0 - 15.0 %    Platelet Count 334 150 - 450 10e3/uL    % Neutrophils 76 %    % Lymphocytes 15 %    % Monocytes 7 %    % Eosinophils 1 %    % Basophils 0 %    % Immature Granulocytes 1 %    NRBCs per 100 WBC 0 <1 /100    Absolute Neutrophils 8.7 (H) 1.6 - 8.3 10e3/uL    Absolute Lymphocytes 1.7 0.8 - 5.3 10e3/uL    Absolute Monocytes 0.7 0.0 - 1.3 10e3/uL    Absolute Eosinophils 0.1 0.0 - 0.7 10e3/uL    Absolute Basophils 0.0 0.0 - 0.2 10e3/uL    Absolute Immature Granulocytes 0.1 <=0.4 10e3/uL    Absolute NRBCs 0.0 10e3/uL   Result Value Ref Range    CK 86 26 - 192 U/L   Result Value Ref Range    aPTT 32 22 - 38 Seconds   Result Value Ref Range    INR 1.11 0.85 - 1.15   Result Value Ref Range    Hemoglobin 12.5 11.7 - 15.7 g/dL       The creation of this record is based on the scribe s observations of the work being performed by Nichole Mc MD and the provider s statements to them. It was created on his behalf by Rama Tanner, a trained medical scribe. This document has been checked and approved by the attending provider.    Nichole Mc MD  Emergency Medicine  [facility] EMERGENCY DEPARTMENT  [address] [place] [address] [id number] [phone]  Dept: [phone]     Nichole Mc MD  09/30/22 0022

## 2022-09-30 NOTE — H&P
[facility]    History and Physical - Hospitalist Service      Assessment and Plan  Active Problems:    * No active hospital problems. *    62 year old female with past medical history of hypertension, alcohol and smoking abuse who presents to the ED for the evaluation of leg swelling.  She was admitted with,    Acute left thigh hematoma  - Etiology is unclear, patient is not sure about trauma.  - MRI of leg shows a large mass in the vastus intermedius muscle suggestive of acute hematoma with active extravasation  - IR provider was contacted by ER physician and not recommending any intervention at this point, if hemoglobin is dropping then would order CTA and notify IR.  - Discontinue home aspirin  - No NSAIDs  - Continue to monitor hemoglobin    Left leg swelling  - Secondary to hematoma  - Negative ultrasound for DVT  - Orthopedic was contacted by ER provider regarding concern for compartment syndrome who recommended continued observation and check CK level and use ice packs.    Accelerated hypertension  - Patient has history of hypertension but not currently taking any medication  - Increase blood pressure in the ER, probably secondary to pain  - Start Norvasc   - Check echo  - Add hydralazine IV as needed  - Continue to monitor blood pressure    Left leg pain  - Secondary to acute hematoma  - Avoid NSAIDs because of bleeding  - Start scheduled Tylenol 3 times daily  - Ice packing  - Add oxycodone as needed  - PT evaluation    Hyponatremia  - Probably secondary to poor oral intake  - Gentle IV hydration overnight  - Continue to monitor sodium level    Smoking dependence  - Discussed with patient to quit  - Nicotine patch as needed    Alcohol abuse  - Patient drinks 3-4 beers every day  - No sign or symptom of alcohol withdrawal  - Continue to monitor closely        COVID STATUS: Negative date: 9/30/2022    VTE prophylaxis:  Pneumatic Compression Devices  DIET:  None      Disposition/Barriers to discharge: Monitor hemoglobin and blood pressure, pain control  Code Status:No Order    HPI:   Tierney Pearson is a 62 year old female with past medical history of hypertension, alcohol and smoking abuse who presents to the ED via wheelchair for the evaluation of leg swelling.   Basically the patient started having left leg swelling yesterday and said it got worse today. She said it came on suddenly. She took 3 baby Asprin for her pain and said it helped.   Patient does not recall history of trauma to her left leg but mentioned that she had bone patella couple of time is at the table  She denies any history of blood clots, recent travel, surgery immobilization, and any new shortness of breath or chest pain. She is not taking any daily medication and has no other health problems.  Patient continues to smoke about three quarters of a pack daily and drinks 3-4 beers every day.  Denies any drug abuse.     History reviewed. No pertinent past medical history.  Past Surgical History:   Procedure Laterality Date     NO HISTORY OF SURGERY       Family History   Problem Relation Age of Onset     Breast Cancer Mother      Heart Disease Father         issues     Breast Cancer Sister      Social History     Socioeconomic History     Marital status: Single     Spouse name: Not on file     Number of children: 0     Years of education: 14     Highest education level: Not on file   Occupational History     Occupation: Behavioral Coordinator     Employer: [employer]   Tobacco Use     Smoking status: Former Smoker     Packs/day: 0.20     Years: 30.00     Pack years: 6.00     Types: Cigarettes     Quit date: 1999     Years since quittin.7     Smokeless tobacco: Never Used   Substance and Sexual Activity     Alcohol use: Yes     Comment: Occasional on the weekends - 12 pack     Drug use: No     Sexual activity: Yes     Partners: Male   Other Topics Concern     Parent/sibling w/ CABG, MI or  angioplasty before 65F 55M? No   Social History Narrative     Not on file     Social Determinants of Health     Financial Resource Strain: Not on file   Food Insecurity: Not on file   Transportation Needs: Not on file   Physical Activity: Not on file   Stress: Not on file   Social Connections: Not on file   Intimate Partner Violence: Not on file   Housing Stability: Not on file     Allergies   Allergen Reactions     Ciprofloxacin      Rash and hives.     Seasonal Allergies      Polymyxin B Hives and Rash       PRIOR TO ADMISSION MEDICATIONS   (Not in a hospital admission)       REVIEW OF SYSTEMS:  12 point reviewed pertinent negatives and positives in HPI all others negative     PHYSICAL EXAM  B/P:257/128 T:97.7 P:72 R: 20     Head:    Normocephalic, without obvious abnormality, atraumatic   Eyes:    PERRL, conjunctiva/corneas clear, EOM's intact,both eyes    Ears:    Normal external ear canals no drainage or erythema bilat.   Nose:   Nares normal by gross inspection,  mucosa normal, no drainage or sinus tenderness   Throat:   Lips, mucosa, and tongue normal; teeth and gums normal   Neck:   Supple, symmetrical, trachea midline, no adenopathy;        thyroid:  No enlargement/tenderness/nodules   Back:     Symmetric, no curvature, ROM normal, no CVA tenderness   Lungs:    Decreased breath sounds lung base with scattered rhonchi.  No wheezing.   Chest wall:    No tenderness or deformity   Heart:    Regular rate and rhythm, S1 and S2 normal, I/VI systolic murmur, no rubs, no JVD, no edema   Abdomen:     Soft, non-tender, bowel sounds active all four quadrants,     no masses, no hepatosplenomegaly   Musculoskeletal:  Positive swelling and tenderness on left front thigh.   Pulses:   2+ and symmetric all extremities   Skin:   Skin color, texture, turgor normal, no rashes or lesions on exposed areas, please see nursing assessment for full skin assessment   Neurologic:  Grossly intact, no focal deficit.         PERTINENT  LABS and RADIOLOGY   Recent Labs   Lab 09/29/22  2333 09/29/22  2235 09/29/22  1756   WBC  --   --  11.1*   HGB 12.5  --  13.2   MCV  --   --  91   PLT  --   --  334   INR  --  1.11  --    NA  --   --  130*   POTASSIUM  --   --  3.8   CHLORIDE  --   --  92*   CO2  --   --  25   BUN  --   --  6.0*   CR  --   --  0.43*   ANIONGAP  --   --  13   NAEEM  --   --  8.9   GLC  --   --  97     Recent Results (from the past 24 hour(s))   US Lower Extremity Venous Duplex Left    Narrative    EXAM: US LOWER EXTREMITY VENOUS DUPLEX LEFT  LOCATION: [facility]  DATE/TIME: 9/29/2022 8:08 PM    INDICATION: Lower extremity swelling.  COMPARISON: 02/25/2009.  TECHNIQUE: Venous Duplex ultrasound of the left lower extremity with and without compression, augmentation and duplex. Color flow and spectral Doppler with waveform analysis performed.    FINDINGS: Exam includes the common femoral, femoral, popliteal, and contralateral common femoral veins as well as segmentally visualized deep calf veins and greater saphenous vein.     LEFT: No deep vein thrombosis. No superficial thrombophlebitis.     There is a heterogeneous, encapsulated mass within the deep soft tissues of the lateral left thigh, patient's site of pain. There is a scant amount of detectable internal vascularity. Several small fluid fluid levels are additionally noted. This mass   measures approximately 7.8 x 6.8 x 3.8 cm.      Impression    IMPRESSION:  1.  No deep venous thrombosis in the left lower extremity.  2.  Indeterminate, heterogeneous mass within the deep soft tissues of the lateral left thigh, with scant detectable internal vascularity. A hematoma is favored, though a neoplastic lesion cannot be excluded, given the presence of some detectable internal   vascularity. MR imaging of the left thigh, with and without intravenous contrast, is recommended for definitive characterization.   MR Femur Thigh Left wo & w Contrast    Narrative    EXAM:  MR FEMUR THIGH LEFT W/O and W CONTRAST  LOCATION: [facility]  DATE/TIME: 9/29/2022 9:54 PM    INDICATION: Thigh pain and swelling.  COMPARISON: 9/29/2022 ultrasound.  TECHNIQUE: Routine. Additional postgadolinium T1 sequences were obtained.  IV CONTRAST: 7 mL Gadavist    FINDINGS:     JOINTS AND BONES:  -No fracture or bone contusion. Normal articular cartilage. No effusion. No marrow replacing lesion.    TENDONS:  -No tendon tear, tendinopathy, or tenosynovitis.    MUSCLES AND SOFT TISSUES:  -There is a large, heterogeneous signal intensity mass centered in the midportion of the thigh, within the vastus intermedius muscle with extensive surrounding soft tissue edema throughout the vastus musculature with anterior displacement of the rectus   femoris and some associated fascial edema involving the deep portion of the sartorius as well as the anterior margins of the short adductors. There is a somewhat linear area of increased signal intensity along the lateral margin of this collection; which   likely represents an area of active extravasation in the setting of an underlying hematoma rather than true internal enhancement as seen on series 8, image 36. This collection demonstrates some areas of mildly increased T1 signal suggesting it   represents hematoma. Overall, the collection measures approximately 6.0 cm transverse by 4.5 cm AP by approximately 7.5 cm craniocaudal. The associated surrounding edema extends cephalad to almost the level of the hip caudally to almost the level of the   knee. No muscle atrophy.      Impression    IMPRESSION:  1.  Large mass-like area in the midportion of the left thigh centered within the vastus intermedius muscle most suggestive of an area of acute hematoma including an area of presumed active extravasation along the lateral margin of the mass. There is   extensive surrounding intra and perimuscular/fascial edema extending nearly the entire length of the  thigh. The extent of the soft tissue changes could obscure an underlying small mass that has bled and follow-up MRI would be helpful.     EKG:none     Discussed with patient, family and nursing staff     Alexandr Conrad MD  [facility] Internal Medicine Hospitalist  [phone]

## 2022-09-30 NOTE — PROGRESS NOTES
09/30/22 1245   Quick Adds   Quick Adds Certification   Type of Visit Initial PT Evaluation   Living Environment   People in Home significant other   Current Living Arrangements house   Home Accessibility stairs to enter home   Number of Stairs, Main Entrance 4   Stair Railings, Main Entrance railing on right side (ascending)   Self-Care   Usual Activity Tolerance excellent   Current Activity Tolerance moderate   Equipment Currently Used at Home none  (but she does have walking sticks and crutches.)   Activity/Exercise/Self-Care Comment Pt is indep with mobility and does drive.  Pt is indep with all ADLs.  Pt does work full time.   General Information   Onset of Illness/Injury or Date of Surgery 09/29/22   Referring Physician Dr Conrad   Patient/Family Therapy Goals Statement (PT) to go home   Pertinent History of Current Problem (include personal factors and/or comorbidities that impact the POC) Per H&P:   62 year old female with past medical history of hypertension, alcohol and smoking abuse who presents to the ED for the evaluation of leg swelling.  She was admitted with,     #Acute left thigh hematoma   Weight-Bearing Status - LLE weight-bearing as tolerated   Weight-Bearing Status - RLE weight-bearing as tolerated   Cognition   Affect/Mental Status (Cognition) WNL   Pain Assessment   Patient Currently in Pain   (L thigh pain.  Some increased pain with walking but did mikel gait with the FWW and the crutches.)   Integumentary/Edema   Integumentary/Edema Comments Pt does have increased edema L thigh area.   Range of Motion (ROM)   ROM Comment L LE knee flex limited by pain and thigh edema.  R LE WFL   Strength (Manual Muscle Testing)   Strength Comments Pt was able to place some wt on the L LE with gait.  R LE 5/5   Bed Mobility   Comment, (Bed Mobility) supine<>sit with CGA with the L LE x 2 reps.   Transfers   Comment, (Transfers) sit <>stand with the FWW with SBA and CGA with the crutches.  Cues for technique.    Gait/Stairs (Locomotion)   [place] Level (Gait) contact guard   Assistive Device (Gait) crutches, axillary   Distance in Feet (Required for LE Total Joints) 25' with crutches and 25' with the FWW   Pattern (Gait) step-through;swing-through   Deviations/Abnormal Patterns (Gait) antalgic   Maintains Weight-bearing Status (Gait)   (Pt was able place some wt on the L LE.)   Negotiation (Stairs) stairs independence;stairs assistive device;handrail location;number of steps;ascending technique;descending technique;maintains weight-bearing status;other (see comments)   [place] Level (Stairs) minimum assist (75% patient effort);1 person assist   Assistive Device (Stairs) crutches, axillary  (and the right rail.)   Handrail Location (Stairs) right side (ascending)   Number of Steps (Stairs) Pt did go up and down one step with the right rail and crutch with min A x 1. Cues for technique and sequence.   Ascending Technique (Stairs) step-to-step   Descending Technique (Stairs) step-to-step   Comment, (Gait/Stairs) Pt said she feels like she can manage steps.  PT did recommend she have assist on the steps.   Balance   Balance Comments CGA with crutches ad SBA with the FWW   Sensory Examination   Sensory Perception Comments Pt does have some tingling bilat feet.   Clinical Impression   Criteria for Skilled Therapeutic Intervention Yes, treatment indicated   PT Diagnosis (PT) impaired mobility   Influenced by the following impairments L thigh pain, increased L thigh edema, dec P/AROM left knee, dec bal, dec endurance.   Functional limitations due to impairments bed mobility, transfers, gait and steps.   Clinical Presentation (PT Evaluation Complexity) Stable/Uncomplicated   Clinical Presentation Rationale Pt presents medically diagnosed   Clinical Decision Making (Complexity) low complexity   Planned Therapy Interventions (PT) bed mobility training;gait training;stair training;strengthening;transfer training    Anticipated Equipment Needs at Discharge (PT) crutches, axillary  (Pt does have crutches and walking sticks.)   Risk & Benefits of therapy have been explained evaluation/treatment results reviewed;care plan/treatment goals reviewed;risks/benefits reviewed;patient;participants voiced agreement with care plan   PT Discharge Planning   PT Discharge Recommendation (DC Rec) home with assist   PT Rationale for DC Rec Pt was able to walk with the crutches and should be able to progress to dc to home with family to assist.  Pt does have crutches at home already.   Therapy Certification   Start of care date 09/30/22   Certification date from 09/30/22   Certification date to 10/07/22   Medical Diagnosis left thigh hematoma   Total Evaluation Time   Total Evaluation Time (Minutes) 15   Physical Therapy Goals   PT Frequency Daily   PT Predicted Duration/Target Date for Goal Attainment 10/07/22   PT Goals Bed Mobility;Transfers;Gait;Stairs   PT: Bed Mobility Independent;Supine to/from sit   PT: Transfers Modified independent;Sit to/from stand;Bed to/from chair;Assistive device   PT: Gait Supervision/stand-by assist;Crutches;150 feet   PT: Stairs Supervision/stand-by assist;4 stairs;Rail on right  (and  crutches.)

## 2022-09-30 NOTE — CONSULTS
"Care Management Initial Consult    General Information  Assessment completed with: PatientTierney  Type of CM/SW Visit: Initial Assessment    Primary Care Provider verified and updated as needed: Yes   Readmission within the last 30 days: no previous admission in last 30 days      Reason for Consult: discharge planning  Advance Care Planning: Advance Care Planning Reviewed: no concerns identified          Communication Assessment  Patient's communication style: spoken language (English or Bilingual)             Cognitive  Cognitive/Neuro/Behavioral: WDL                      Living Environment:   People in home: significant other  Tierney and Conor Significant Other  Current living Arrangements: house (\"A few steps to get inside the [place] house. I can stay on 1 level if needed\".)      Able to return to prior arrangements: yes       Family/Social Support:  Care provided by: self  Provides care for: no one  Marital Status: Lives with Significant Other  Significant Other       Conor  Description of Support System: Supportive, Involved    Support Assessment: Adequate family and caregiver support, Adequate social supports, Patient communicates needs well met    Current Resources:   Patient receiving home care services: No     Community Resources: None  Equipment currently used at home: other (see comments) (\"I do have crutches and walking sticks at home, but don't normally use them\".)  Supplies currently used at home: Other (\"dentures and reading glasses\")    Employment/Financial:  Employment Status: employed full-time     Employment/ Comments: \"no  benefits\"  Financial Concerns:     Referral to Financial Worker: No       Lifestyle & Psychosocial Needs:  Social Determinants of Health     Tobacco Use: Medium Risk     Smoking Tobacco Use: Former Smoker     Smokeless Tobacco Use: Never Used   Alcohol Use: Not on file   Financial Resource Strain: Not on file   Food Insecurity: Not on file   Transportation " "Needs: Not on file   Physical Activity: Not on file   Stress: Not on file   Social Connections: Not on file   Intimate Partner Violence: Not on file   Depression: Not on file   Housing Stability: Not on file       Functional Status:  Prior to admission patient needed assistance:   Dependent ADLs:: Independent, Ambulation-no assistive device  Dependent IADLs:: Independent  Assesssment of Functional Status: At functional baseline    Mental Health Status:          Chemical Dependency Status:  Chemical Dependency Status: Current Concern             Values/Beliefs:  Spiritual, Cultural Beliefs, Christianity Practices, Values that affect care:                 Additional Information:  Tierney lives in a house with her significant other. Her house has \"a few steps to get inside the [place] house. I can stay on 1 level if needed\".  \"I do have crutches and walking sticks at home, but don't normally use them\".    She is independent with ADLs at baseline.    Likely no discharge needs at this time. She wants any discharge meds filled at pharmacy before leaving.    Family to transport at discharge.    What is Observation was given.    Janki Mosquera RN      "

## 2022-09-30 NOTE — ED NOTES
BP LA @ 00:36 /115  LAYING  BP LA @ 00:37 /105 LAYING   BP RA @00:38 /114 SITTING   BP RA @00:42 /110 SITTING

## 2022-10-01 ENCOUNTER — APPOINTMENT (OUTPATIENT)
Dept: PHYSICAL THERAPY | Facility: HOSPITAL | Age: 62
End: 2022-10-01
Payer: COMMERCIAL

## 2022-10-01 LAB
ALBUMIN SERPL BCG-MCNC: 3.7 G/DL (ref 3.5–5.2)
ALP SERPL-CCNC: 54 U/L (ref 35–104)
ALT SERPL W P-5'-P-CCNC: 11 U/L (ref 10–35)
ANION GAP SERPL CALCULATED.3IONS-SCNC: 9 MMOL/L (ref 7–15)
AST SERPL W P-5'-P-CCNC: 18 U/L (ref 10–35)
BASOPHILS # BLD AUTO: 0 10E3/UL (ref 0–0.2)
BASOPHILS NFR BLD AUTO: 0 %
BILIRUB SERPL-MCNC: 0.4 MG/DL
BUN SERPL-MCNC: 6.9 MG/DL (ref 8–23)
CALCIUM SERPL-MCNC: 8.9 MG/DL (ref 8.8–10.2)
CHLORIDE SERPL-SCNC: 98 MMOL/L (ref 98–107)
CK SERPL-CCNC: 105 U/L (ref 26–192)
CREAT SERPL-MCNC: 0.43 MG/DL (ref 0.51–0.95)
CRP SERPL-MCNC: 15.3 MG/L
DEPRECATED HCO3 PLAS-SCNC: 27 MMOL/L (ref 22–29)
EOSINOPHIL # BLD AUTO: 0.2 10E3/UL (ref 0–0.7)
EOSINOPHIL NFR BLD AUTO: 3 %
ERYTHROCYTE [DISTWIDTH] IN BLOOD BY AUTOMATED COUNT: 12.6 % (ref 10–15)
GFR SERPL CREATININE-BSD FRML MDRD: >90 ML/MIN/1.73M2
GLUCOSE SERPL-MCNC: 96 MG/DL (ref 70–99)
HCT VFR BLD AUTO: 30.9 % (ref 35–47)
HGB BLD-MCNC: 10.8 G/DL (ref 11.7–15.7)
IMM GRANULOCYTES # BLD: 0 10E3/UL
IMM GRANULOCYTES NFR BLD: 0 %
LYMPHOCYTES # BLD AUTO: 1.1 10E3/UL (ref 0.8–5.3)
LYMPHOCYTES NFR BLD AUTO: 15 %
MCH RBC QN AUTO: 32.1 PG (ref 26.5–33)
MCHC RBC AUTO-ENTMCNC: 35 G/DL (ref 31.5–36.5)
MCV RBC AUTO: 92 FL (ref 78–100)
MONOCYTES # BLD AUTO: 0.7 10E3/UL (ref 0–1.3)
MONOCYTES NFR BLD AUTO: 10 %
NEUTROPHILS # BLD AUTO: 5.3 10E3/UL (ref 1.6–8.3)
NEUTROPHILS NFR BLD AUTO: 72 %
NRBC # BLD AUTO: 0 10E3/UL
NRBC BLD AUTO-RTO: 0 /100
PLATELET # BLD AUTO: 317 10E3/UL (ref 150–450)
POTASSIUM SERPL-SCNC: 3.7 MMOL/L (ref 3.4–5.3)
PROT SERPL-MCNC: 6.5 G/DL (ref 6.4–8.3)
RBC # BLD AUTO: 3.36 10E6/UL (ref 3.8–5.2)
SODIUM SERPL-SCNC: 134 MMOL/L (ref 136–145)
WBC # BLD AUTO: 7.4 10E3/UL (ref 4–11)

## 2022-10-01 PROCEDURE — 97530 THERAPEUTIC ACTIVITIES: CPT | Mod: GP

## 2022-10-01 PROCEDURE — 36415 COLL VENOUS BLD VENIPUNCTURE: CPT | Performed by: INTERNAL MEDICINE

## 2022-10-01 PROCEDURE — 85025 COMPLETE CBC W/AUTO DIFF WBC: CPT | Performed by: INTERNAL MEDICINE

## 2022-10-01 PROCEDURE — 86140 C-REACTIVE PROTEIN: CPT | Performed by: INTERNAL MEDICINE

## 2022-10-01 PROCEDURE — 82550 ASSAY OF CK (CPK): CPT | Performed by: INTERNAL MEDICINE

## 2022-10-01 PROCEDURE — 258N000003 HC RX IP 258 OP 636: Performed by: INTERNAL MEDICINE

## 2022-10-01 PROCEDURE — 97110 THERAPEUTIC EXERCISES: CPT | Mod: GP

## 2022-10-01 PROCEDURE — G0378 HOSPITAL OBSERVATION PER HR: HCPCS

## 2022-10-01 PROCEDURE — 82040 ASSAY OF SERUM ALBUMIN: CPT | Performed by: INTERNAL MEDICINE

## 2022-10-01 PROCEDURE — 250N000013 HC RX MED GY IP 250 OP 250 PS 637: Performed by: INTERNAL MEDICINE

## 2022-10-01 PROCEDURE — 96361 HYDRATE IV INFUSION ADD-ON: CPT

## 2022-10-01 PROCEDURE — 99225 PR SUBSEQUENT OBSERVATION CARE,LEVEL II: CPT | Performed by: INTERNAL MEDICINE

## 2022-10-01 PROCEDURE — 80053 COMPREHEN METABOLIC PANEL: CPT | Performed by: INTERNAL MEDICINE

## 2022-10-01 PROCEDURE — 97116 GAIT TRAINING THERAPY: CPT | Mod: GP

## 2022-10-01 RX ORDER — LACTULOSE 10 G/15ML
20 SOLUTION ORAL 2 TIMES DAILY
Status: DISCONTINUED | OUTPATIENT
Start: 2022-10-01 | End: 2022-10-03

## 2022-10-01 RX ADMIN — AMLODIPINE BESYLATE 2.5 MG: 2.5 TABLET ORAL at 08:08

## 2022-10-01 RX ADMIN — OXYCODONE HYDROCHLORIDE 5 MG: 5 TABLET ORAL at 05:59

## 2022-10-01 RX ADMIN — OXYCODONE HYDROCHLORIDE 5 MG: 5 TABLET ORAL at 14:48

## 2022-10-01 RX ADMIN — ACETAMINOPHEN 975 MG: 325 TABLET, FILM COATED ORAL at 08:07

## 2022-10-01 RX ADMIN — SODIUM CHLORIDE: 9 INJECTION, SOLUTION INTRAVENOUS at 23:47

## 2022-10-01 RX ADMIN — ACETAMINOPHEN 975 MG: 325 TABLET, FILM COATED ORAL at 01:28

## 2022-10-01 RX ADMIN — OXYCODONE HYDROCHLORIDE 5 MG: 5 TABLET ORAL at 22:54

## 2022-10-01 RX ADMIN — SODIUM CHLORIDE: 9 INJECTION, SOLUTION INTRAVENOUS at 10:57

## 2022-10-01 RX ADMIN — LACTULOSE 20 G: 10 SOLUTION ORAL at 14:49

## 2022-10-01 RX ADMIN — ACETAMINOPHEN 975 MG: 325 TABLET, FILM COATED ORAL at 17:01

## 2022-10-01 RX ADMIN — SENNOSIDES AND DOCUSATE SODIUM 2 TABLET: 50; 8.6 TABLET ORAL at 08:08

## 2022-10-01 ASSESSMENT — ACTIVITIES OF DAILY LIVING (ADL)
ADLS_ACUITY_SCORE: 32
ADLS_ACUITY_SCORE: 36
ADLS_ACUITY_SCORE: 32
ADLS_ACUITY_SCORE: 36
ADLS_ACUITY_SCORE: 32

## 2022-10-01 NOTE — PROGRESS NOTES
[facility]    Medicine Progress Note - Hospitalist Service    Date of Admission:  9/29/2022  Hpi; constiapted x 3 days since she started narcotics for pain control  Assessment & Plan          62 year old female with past medical history of hypertension, alcohol and smoking abuse who presents to the ED for the evaluation of leg swelling.  She was admitted with,     #Acute left thigh hematoma  -- Etiology is unclear, patient is not sure about trauma.  -- MRI of leg shows a large mass in the vastus intermedius muscle suggestive of acute hematoma with active extravasation  -- IR provider was contacted by ER physician and not recommending any intervention at this point, if hemoglobin is dropping then would order CTA and notify IR.  -- Discontinue home aspirin  -- No NSAIDs  -- Continue to monitor hemoglobin     #Left leg swelling  -- Secondary to hematoma  -- Negative ultrasound for DVT  -- Orthopedic was contacted by ER provider regarding concern for compartment syndrome who recommended continued observation and check CK level and use ice packs.     #Accelerated hypertension  -- Patient has history of hypertension but not currently taking any medication  -- Increase blood pressure in the ER, probably secondary to pain  -- Start Norvasc   -- Check echo  -- Add hydralazine IV as needed  -- Continue to monitor blood pressure     #Left leg pain  -- Secondary to acute hematoma  -- Avoid NSAIDs because of bleeding  -- Start scheduled Tylenol 3 times daily  -- Ice packing  -- Add oxycodone as needed  -- PT evaluation     #Hyponatremia  -- Probably secondary to poor oral intake  -- Gentle IV hydration overnight  -- Continue to monitor sodium level     #Smoking dependence  -- Discussed with patient to quit  -- Nicotine patch as needed     #Alcohol abuse  -- Patient drinks 3-4 beers every day  -- No sign or symptom of alcohol withdrawal  -- Continue to monitor closely   plan:  Start lactulose ,will  discharge home once she has bowel movement      Diet: Regular Diet Adult    DVT Prophylaxis: Pneumatic Compression Devices  Teixeira Catheter: Not present  Central Lines: None  Cardiac Monitoring: None  Code Status: Full Code      Disposition Plan           The patient's care was discussed with the Bedside Nurse and Patient.    Isaac Gonzalez MD  Hospitalist Service  [facility]  Securely message with the Vocera Web Console (learn more here)  Text page via [employer] Paging/Directory         Clinically Significant Risk Factors Present on Admission                          ______________________________________________________________________    Interval History   Patient is still reporting persistent pain and swelling in the left thigh.  Patient will talk to  about readiness to come home.  Expressed to patient she is welcomed to stay overnight if not strong enough to return home.  Continue to monitor sxs compartment syndrome.  Continue to monitor CBC for acute blood loss.    Data reviewed today: I reviewed all medications, new labs and imaging results over the last 24 hours. I personally reviewed no images or EKG's today.    Physical Exam   Vital Signs: Temp: 98.5  F (36.9  C) Temp src: Oral BP: (!) 140/79 Pulse: 89   Resp: 16 SpO2: 93 % O2 Device: None (Room air)    Weight: 145 lbs 0 oz     GENERAL: Alert and oriented x 3; no acute distress; well-nourished.  HEENT: Normocephalic; atraumatic; PERRLA; MMM.  CV: RRR; normal S1, S2; no rubs, murmurs, or gallops.  RESP: Lung fields clear to aucultation B/L; no wheezing or crepitations.  GI: Abdomen is soft, nontender, nondistended; no organomegaly; normal bowel sounds.  : Deferred genital examination.   MSK: Left thigh has increased circumference as compared to right.  DERM: Skin is intact; no rash, lesions, or skin breakdown.  NEURO: No focal deficits appreciated; strength & sensorium are grossly intact.  PSYCH: No active hallucinations;  affect, insight appear within normal limits.    Data   Recent Labs   Lab 10/01/22  0730 09/30/22  1050 09/29/22  2333 09/29/22  2235 09/29/22  1756   WBC 7.4 9.5  --   --  11.1*   HGB 10.8* 12.3 12.5  --  13.2   MCV 92 91  --   --  91    335  --   --  334   INR  --   --   --  1.11  --    * 128*  --   --  130*   POTASSIUM 3.7 3.3*  --   --  3.8   CHLORIDE 98 93*  --   --  92*   CO2 27 24  --   --  25   BUN 6.9* 4.5*  --   --  6.0*   CR 0.43* 0.36*  --   --  0.43*   ANIONGAP 9 11  --   --  13   NAEEM 8.9 8.6*  --   --  8.9   GLC 96 99  --   --  97   ALBUMIN 3.7 4.1  --   --   --    PROTTOTAL 6.5 6.7  --   --   --    BILITOTAL 0.4 1.1  --   --   --    ALKPHOS 54 62  --   --   --    ALT 11 13  --   --   --    AST 18 21  --   --   --      No results found for this or any previous visit (from the past 24 hour(s)).  Medications     sodium chloride 75 mL/hr at 10/01/22 1057       acetaminophen  975 mg Oral Q8H     amLODIPine  2.5 mg Oral Daily     lactulose  20 g Oral BID     nicotine   Transdermal Q8H     senna-docusate  1 tablet Oral BID    Or     senna-docusate  2 tablet Oral BID

## 2022-10-01 NOTE — PLAN OF CARE
Problem: Pain Acute  Goal: Acceptable Pain Control and Functional Ability  Outcome: Ongoing, Progressing  Intervention: Develop Pain Management Plan  Recent Flowsheet Documentation  Taken 10/1/2022 1540 by Tierney Ocampo RN  Pain Management Interventions: (ace wrap re wrapped) cold applied   Goal Outcome Evaluation:        Pain in LLE. Cold applied and ace wrap re applied.   Scheduled tylenol for pain. Rating pain 4-5/10 this evening. Per patient report ace wrap is helping with the pain.  PRIMARY DIAGNOSIS: Hematoma of Left thigh  OUTPATIENT/OBSERVATION GOALS TO BE MET BEFORE DISCHARGE:  1. ADLs back to baseline: No    2. Activity and level of assistance: assist x 1 with walker    3. Pain status: Improved-controlled with oral pain medications.    4. Return to near baseline physical activity: No     Discharge Planner Nurse   Safe discharge environment identified: No  Barriers to discharge: Yes. Needs to have BM prior to discharge.       Entered by: Tierney Lee RN 10/01/2022 6:21 PM     Please review provider order for any additional goals.   Nurse to notify provider when observation goals have been met and patient is ready for discharge.

## 2022-10-01 NOTE — PROGRESS NOTES
"PRIMARY DIAGNOSIS: \"GENERIC\" NURSING  OUTPATIENT/OBSERVATION GOALS TO BE MET BEFORE DISCHARGE:  1. ADLs back to baseline: No    2. Activity and level of assistance: Up with standby assistance.    3. Pain status: Improved-controlled with oral pain medications.    4. Return to near baseline physical activity: No     Discharge Planner Nurse   Safe discharge environment identified: Yes  Barriers to discharge: Pain control.       Entered by: Mimi Soler RN 10/01/2022 1:34 AM     Please review provider order for any additional goals.   Nurse to notify provider when observation goals have been met and patient is ready for discharge.  "

## 2022-10-01 NOTE — PLAN OF CARE
PRIMARY DIAGNOSIS: ACUTE PAIN  OUTPATIENT/OBSERVATION GOALS TO BE MET BEFORE DISCHARGE:  1. Pain Status: Improved-controlled with oral pain medications.    2. Return to near baseline physical activity: No    3. Cleared for discharge by consultants (if involved): No    Discharge Planner Nurse   Safe discharge environment identified: Yes  Barriers to discharge: Yes       Entered by: Galina Hoff RN 10/01/2022 12:16 PM   Patient left thigh pain manage with oxycodone ,tylenol and ice pack. ACE wraps applied, good relief per Patient. Assist of one with transfers. Ambulating on the hallways with PT   Please review provider order for any additional goals.   Nurse to notify provider when observation goals have been met and patient is ready for discharge.Goal Outcome Evaluation:

## 2022-10-01 NOTE — PROGRESS NOTES
PRIMARY DIAGNOSIS: ACUTE PAIN  OUTPATIENT/OBSERVATION GOALS TO BE MET BEFORE DISCHARGE:  1. Pain Status: Improved-controlled with oral pain medications.    2. Return to near baseline physical activity: No    3. Cleared for discharge by consultants (if involved): No    Discharge Planner Nurse   Safe discharge environment identified: Yes  Barriers to discharge: Yes       Entered by: Mimi Soler RN 10/01/2022 6:50 AM     Please review provider order for any additional goals.   Nurse to notify provider when observation goals have been met and patient is ready for discharge.

## 2022-10-01 NOTE — UTILIZATION REVIEW
Continued stay Observation     Concurrent stay review; Secondary Review Determination         Under the authority of the Utilization Management Committee, the utilization review process indicated a secondary review on the above patient.  The review outcome is based on review of the medical records, discussions with staff, and applying clinical experience noted on the date of the review.          (x) Observation Status Appropriate - Concurrent stay review    RATIONALE FOR DETERMINATION     62 year old female with past medical history of hypertension, alcohol and smoking abuse who presents to the ED for the evaluation of leg swelling.  Patient found to have acute left thigh hematoma.  IR was consulted but recommended observation and conservative management at this time.    Patient is clinically improving and there is no clear indication to change patient's status to inpatient. The severity of illness, intensity of service provided, expected LOS and risk for adverse outcome make the care appropriate for observation.    The information on this document is developed by the utilization review team in order for the business office to ensure compliance.  This only denotes the appropriateness of proper admission status and does not reflect the quality of care rendered.         The definitions of Inpatient Status and Observation Status used in making the determination above are those provided in the CMS Coverage Manual, Chapter 1 and Chapter 6, section 70.4.      Sincerely,   Medardo Puri MD    Utilization Review  Physician Advisor  [facility].

## 2022-10-01 NOTE — PLAN OF CARE
PRIMARY DIAGNOSIS: ACUTE PAIN  OUTPATIENT/OBSERVATION GOALS TO BE MET BEFORE DISCHARGE:  1. Pain Status: Improved-controlled with oral pain medications.    2. Return to near baseline physical activity: No    3. Cleared for discharge by consultants (if involved): No    Discharge Planner Nurse   Safe discharge environment identified: Yes  Barriers to discharge: Yes       Entered by: Galina Hoff RN 10/01/2022 8:18 AM     Please review provider order for any additional goals.   Nurse to notify provider when observation goals have been met and patient is ready for discharge.Goal Outcome Evaluation:

## 2022-10-01 NOTE — PLAN OF CARE
"Goal Outcome Evaluation:    Plan of Care Reviewed With: patient     Overall Patient Progress: improving    Outcome Evaluation: Pt stated that her left thigh pain is \"better than it has been this whole time\", rating her pain 4/10 and manageable. She was able to ambulate to the bathroom with an assist of one and walker.      "

## 2022-10-02 ENCOUNTER — APPOINTMENT (OUTPATIENT)
Dept: PHYSICAL THERAPY | Facility: HOSPITAL | Age: 62
End: 2022-10-02
Payer: COMMERCIAL

## 2022-10-02 PROCEDURE — 96361 HYDRATE IV INFUSION ADD-ON: CPT

## 2022-10-02 PROCEDURE — 250N000013 HC RX MED GY IP 250 OP 250 PS 637: Performed by: INTERNAL MEDICINE

## 2022-10-02 PROCEDURE — 258N000003 HC RX IP 258 OP 636: Performed by: INTERNAL MEDICINE

## 2022-10-02 PROCEDURE — 97110 THERAPEUTIC EXERCISES: CPT | Mod: GP

## 2022-10-02 PROCEDURE — 97116 GAIT TRAINING THERAPY: CPT | Mod: GP

## 2022-10-02 PROCEDURE — G0378 HOSPITAL OBSERVATION PER HR: HCPCS

## 2022-10-02 PROCEDURE — 99225 PR SUBSEQUENT OBSERVATION CARE,LEVEL II: CPT | Performed by: INTERNAL MEDICINE

## 2022-10-02 RX ADMIN — AMLODIPINE BESYLATE 2.5 MG: 2.5 TABLET ORAL at 10:21

## 2022-10-02 RX ADMIN — ACETAMINOPHEN 975 MG: 325 TABLET, FILM COATED ORAL at 10:21

## 2022-10-02 RX ADMIN — SODIUM CHLORIDE: 9 INJECTION, SOLUTION INTRAVENOUS at 12:39

## 2022-10-02 RX ADMIN — ACETAMINOPHEN 975 MG: 325 TABLET, FILM COATED ORAL at 01:02

## 2022-10-02 RX ADMIN — NICOTINE 1 PATCH: 21 PATCH, EXTENDED RELEASE TRANSDERMAL at 10:33

## 2022-10-02 RX ADMIN — SENNOSIDES AND DOCUSATE SODIUM 2 TABLET: 50; 8.6 TABLET ORAL at 21:12

## 2022-10-02 RX ADMIN — ACETAMINOPHEN 975 MG: 325 TABLET, FILM COATED ORAL at 17:46

## 2022-10-02 RX ADMIN — LACTULOSE 20 G: 10 SOLUTION ORAL at 21:11

## 2022-10-02 ASSESSMENT — ACTIVITIES OF DAILY LIVING (ADL)
ADLS_ACUITY_SCORE: 32

## 2022-10-02 NOTE — PROGRESS NOTES
[facility]    Medicine Progress Note - Hospitalist Service    Date of Admission:  9/29/2022  Hpi; constiapted x 3 days since she started narcotics for pain control  Pt has bowel movements after lactulose ,now c/o of weakness and dizziness   Assessment & Plan          62 year old female with past medical history of hypertension, alcohol and smoking abuse who presents to the ED for the evaluation of leg swelling.  She was admitted with,     #Acute left thigh hematoma  -- Etiology is unclear, patient is not sure about trauma.  -- MRI of leg shows a large mass in the vastus intermedius muscle suggestive of acute hematoma with active extravasation  -- IR provider was contacted by ER physician and not recommending any intervention at this point, if hemoglobin is dropping then would order CTA and notify IR.  -- Discontinue home aspirin  -- No NSAIDs  -- Continue to monitor hemoglobin     #Left leg swelling  -- Secondary to hematoma  -- Negative ultrasound for DVT  -- Orthopedic was contacted by ER provider regarding concern for compartment syndrome who recommended continued observation and check CK level and use ice packs.     #Accelerated hypertension  -- Patient has history of hypertension but not currently taking any medication  -- Increase blood pressure in the ER, probably secondary to pain  -- Start Norvasc   -- Check echo  -- Add hydralazine IV as needed  -- Continue to monitor blood pressure     #Left leg pain  -- Secondary to acute hematoma  -- Avoid NSAIDs because of bleeding  -- Start scheduled Tylenol 3 times daily  -- Ice packing  -- Add oxycodone as needed  -- PT evaluation     #Hyponatremia  -- Probably secondary to poor oral intake  -- Gentle IV hydration overnight  -- Continue to monitor sodium level     #Smoking dependence  -- Discussed with patient to quit  -- Nicotine patch as needed     #Alcohol abuse  -- Patient drinks 3-4 beers every day  -- No sign or symptom of alcohol  withdrawal  -- Continue to monitor closely   plan:  Start lactulose ,will discharge home once she has bowel movement      Diet: Regular Diet Adult    DVT Prophylaxis: Pneumatic Compression Devices  Teixeira Catheter: Not present  Central Lines: None  Cardiac Monitoring: None  Code Status: Full Code      Disposition Plan           The patient's care was discussed with the Bedside Nurse and Patient.    Isaac Gonzalez MD  Hospitalist Service  [facility]  Securely message with the Vocera Web Console (learn more here)  Text page via [employer] Paging/Directory         Clinically Significant Risk Factors Present on Admission                          ______________________________________________________________________    Interval History   Patient is still reporting persistent pain and swelling in the left thigh.  Patient will talk to  about readiness to come home.  Expressed to patient she is welcomed to stay overnight if not strong enough to return home.  Continue to monitor sxs compartment syndrome.  Continue to monitor CBC for acute blood loss.    Data reviewed today: I reviewed all medications, new labs and imaging results over the last 24 hours. I personally reviewed no images or EKG's today.    Physical Exam   Vital Signs: Temp: 98.1  F (36.7  C) Temp src: Oral BP: 120/63 Pulse: 83   Resp: 16 SpO2: 97 % O2 Device: None (Room air)    Weight: 145 lbs 0 oz     GENERAL: Alert and oriented x 3; no acute distress; well-nourished.  HEENT: Normocephalic; atraumatic; PERRLA; MMM.  CV: RRR; normal S1, S2; no rubs, murmurs, or gallops.  RESP: Lung fields clear to aucultation B/L; no wheezing or crepitations.  GI: Abdomen is soft, nontender, nondistended; no organomegaly; normal bowel sounds.  : Deferred genital examination.   MSK: Left thigh has increased circumference as compared to right.  DERM: Skin is intact; no rash, lesions, or skin breakdown.  NEURO: No focal deficits appreciated; strength  & sensorium are grossly intact.  PSYCH: No active hallucinations; affect, insight appear within normal limits.    Data   Recent Labs   Lab 10/01/22  0730 09/30/22  1050 09/29/22  2333 09/29/22  2235 09/29/22  1756   WBC 7.4 9.5  --   --  11.1*   HGB 10.8* 12.3 12.5  --  13.2   MCV 92 91  --   --  91    335  --   --  334   INR  --   --   --  1.11  --    * 128*  --   --  130*   POTASSIUM 3.7 3.3*  --   --  3.8   CHLORIDE 98 93*  --   --  92*   CO2 27 24  --   --  25   BUN 6.9* 4.5*  --   --  6.0*   CR 0.43* 0.36*  --   --  0.43*   ANIONGAP 9 11  --   --  13   NAEEM 8.9 8.6*  --   --  8.9   GLC 96 99  --   --  97   ALBUMIN 3.7 4.1  --   --   --    PROTTOTAL 6.5 6.7  --   --   --    BILITOTAL 0.4 1.1  --   --   --    ALKPHOS 54 62  --   --   --    ALT 11 13  --   --   --    AST 18 21  --   --   --      No results found for this or any previous visit (from the past 24 hour(s)).  Medications     sodium chloride 75 mL/hr at 10/02/22 1239       acetaminophen  975 mg Oral Q8H     amLODIPine  2.5 mg Oral Daily     lactulose  20 g Oral BID     nicotine   Transdermal Q8H     senna-docusate  1 tablet Oral BID    Or     senna-docusate  2 tablet Oral BID

## 2022-10-02 NOTE — PROGRESS NOTES
PRIMARY DIAGNOSIS: ACUTE PAIN  OUTPATIENT/OBSERVATION GOALS TO BE MET BEFORE DISCHARGE:  1. Pain Status: Improved-controlled with oral pain medications.    2. Return to near baseline physical activity: No    3. Cleared for discharge by consultants (if involved): Yes    Discharge Planner Nurse   Safe discharge environment identified: Yes  Barriers to discharge: No       Entered by: Nancy Vasques RN 10/02/2022 6:38 AM     Please review provider order for any additional goals.   Nurse to notify provider when observation goals have been met and patient is ready for discharge.

## 2022-10-02 NOTE — PROGRESS NOTES
PRIMARY DIAGNOSIS: ACUTE PAIN  OUTPATIENT/OBSERVATION GOALS TO BE MET BEFORE DISCHARGE:  1. Pain Status: Improved-controlled with oral pain medications.    2. Return to near baseline physical activity: No    3. Cleared for discharge by consultants (if involved): No    Discharge Planner Nurse   Safe discharge environment identified: Yes  Barriers to discharge: Yes       Entered by: Nancy Vasques RN 10/02/2022 2:06 AM     Please review provider order for any additional goals.   Nurse to notify provider when observation goals have been met and patient is ready for discharge.

## 2022-10-02 NOTE — PROGRESS NOTES
PRIMARY DIAGNOSIS: ACUTE PAIN  OUTPATIENT/OBSERVATION GOALS TO BE MET BEFORE DISCHARGE:  1. Pain Status: Improved-controlled with oral pain medications.    2. Return to near baseline physical activity: No    3. Cleared for discharge by consultants (if involved): Yes    Discharge Planner Nurse   Safe discharge environment identified: Yes  Barriers to discharge: Yes       Entered by: Nancy Vasques RN 10/02/2022 3:38 AM     Please review provider order for any additional goals.   Nurse to notify provider when observation goals have been met and patient is ready for discharge.

## 2022-10-02 NOTE — PLAN OF CARE
Problem: Plan of Care - These are the overarching goals to be used throughout the patient stay.    Goal: Absence of Hospital-Acquired Illness or Injury  Intervention: Identify and Manage Fall Risk  Recent Flowsheet Documentation  Taken 10/2/2022 0102 by Nancy Vasques RN  Safety Promotion/Fall Prevention:    activity supervised    bed alarm on    fall prevention program maintained    increased rounding and observation    nonskid shoes/slippers when out of bed  Taken 10/1/2022 2254 by Nancy Vasques RN  Safety Promotion/Fall Prevention:    activity supervised    bed alarm on    fall prevention program maintained    increased rounding and observation    nonskid shoes/slippers when out of bed     Problem: Plan of Care - These are the overarching goals to be used throughout the patient stay.    Goal: Optimal Comfort and Wellbeing  Outcome: Ongoing, Progressing     Problem: Pain Acute  Goal: Acceptable Pain Control and Functional Ability  Intervention: Prevent or Manage Pain  Recent Flowsheet Documentation  Taken 10/2/2022 0102 by Nancy Vasques RN  Medication Review/Management: medications reviewed  Taken 10/1/2022 2254 by Nancy Vasques RN  Medication Review/Management: medications reviewed   Goal Outcome Evaluation:    Pain in LLE extremity, pt reports pain levels between 8-10. Pain is managed with scheduled tylenol, prn oxy, and ice pack to leg. Pt is assist x1 with walker to the bathroom. Pt ambulated to bathroom x4 this shift, no BM.    Nancy Vasques RN

## 2022-10-03 VITALS
RESPIRATION RATE: 20 BRPM | HEIGHT: 67 IN | TEMPERATURE: 99.3 F | BODY MASS INDEX: 22.76 KG/M2 | OXYGEN SATURATION: 99 % | WEIGHT: 145 LBS | SYSTOLIC BLOOD PRESSURE: 145 MMHG | HEART RATE: 79 BPM | DIASTOLIC BLOOD PRESSURE: 81 MMHG

## 2022-10-03 PROCEDURE — 99217 PR OBSERVATION CARE DISCHARGE: CPT | Performed by: INTERNAL MEDICINE

## 2022-10-03 PROCEDURE — 96361 HYDRATE IV INFUSION ADD-ON: CPT

## 2022-10-03 PROCEDURE — G0378 HOSPITAL OBSERVATION PER HR: HCPCS

## 2022-10-03 PROCEDURE — 258N000003 HC RX IP 258 OP 636: Performed by: INTERNAL MEDICINE

## 2022-10-03 PROCEDURE — 250N000013 HC RX MED GY IP 250 OP 250 PS 637: Performed by: INTERNAL MEDICINE

## 2022-10-03 RX ORDER — AMLODIPINE BESYLATE 2.5 MG/1
2.5 TABLET ORAL DAILY
Qty: 90 TABLET | Refills: 0 | Status: SHIPPED | OUTPATIENT
Start: 2022-10-04

## 2022-10-03 RX ORDER — OXYCODONE HYDROCHLORIDE 5 MG/1
5 TABLET ORAL EVERY 6 HOURS PRN
Qty: 20 TABLET | Refills: 0 | Status: SHIPPED | OUTPATIENT
Start: 2022-10-03 | End: 2022-10-10

## 2022-10-03 RX ORDER — ACETAMINOPHEN 325 MG/1
650 TABLET ORAL EVERY 8 HOURS
Qty: 50 TABLET | Refills: 0 | Status: SHIPPED | OUTPATIENT
Start: 2022-10-03

## 2022-10-03 RX ORDER — NICOTINE 21 MG/24HR
1 PATCH, TRANSDERMAL 24 HOURS TRANSDERMAL DAILY PRN
Qty: 30 PATCH | Refills: 0 | Status: SHIPPED | OUTPATIENT
Start: 2022-10-03

## 2022-10-03 RX ADMIN — ACETAMINOPHEN 975 MG: 325 TABLET, FILM COATED ORAL at 08:07

## 2022-10-03 RX ADMIN — LACTULOSE 20 G: 10 SOLUTION ORAL at 08:07

## 2022-10-03 RX ADMIN — AMLODIPINE BESYLATE 2.5 MG: 2.5 TABLET ORAL at 08:07

## 2022-10-03 RX ADMIN — ACETAMINOPHEN 975 MG: 325 TABLET, FILM COATED ORAL at 00:52

## 2022-10-03 RX ADMIN — SODIUM CHLORIDE: 9 INJECTION, SOLUTION INTRAVENOUS at 02:08

## 2022-10-03 RX ADMIN — SENNOSIDES AND DOCUSATE SODIUM 1 TABLET: 50; 8.6 TABLET ORAL at 08:07

## 2022-10-03 ASSESSMENT — ACTIVITIES OF DAILY LIVING (ADL)
ADLS_ACUITY_SCORE: 32

## 2022-10-03 NOTE — PROGRESS NOTES
"PRIMARY DIAGNOSIS: \"GENERIC\" NURSING  OUTPATIENT/OBSERVATION GOALS TO BE MET BEFORE DISCHARGE:  1. ADLs back to baseline: Yes    2. Activity and level of assistance: Ambulating independently.    3. Pain status: Improved-controlled with oral pain medications.    4. Return to near baseline physical activity: Yes     Discharge Planner Nurse   Safe discharge environment identified: Yes  Barriers to discharge: No       Entered by: Robin Beltre RN 10/03/2022 1:32 PM     Please review provider order for any additional goals.   Nurse to notify provider when observation goals have been met and patient is ready for discharge.  " PA completed and faxed to [email or web address]  Confirmation received. Awaiting a response.

## 2022-10-03 NOTE — PROGRESS NOTES
PRIMARY DIAGNOSIS: ACUTE PAIN  OUTPATIENT/OBSERVATION GOALS TO BE MET BEFORE DISCHARGE:  1. Pain Status: Improved-controlled with oral pain medications.    2. Return to near baseline physical activity: No    3. Cleared for discharge by consultants (if involved): Yes    Discharge Planner Nurse   Safe discharge environment identified: Yes  Barriers to discharge: Yes       Entered by: Nancy Vasques RN 10/03/2022 1:02 AM     Please review provider order for any additional goals.   Nurse to notify provider when observation goals have been met and patient is ready for discharge.

## 2022-10-03 NOTE — PROGRESS NOTES
Patient discharged at 1:25 pm from [facility]. NA transported patient to front door in wheelchair. Discharged instructions explained and questions answered. Patient sent home with Tylenol prescription from pharmacy and scripts for other medications.

## 2022-10-03 NOTE — PROGRESS NOTES
PRIMARY DIAGNOSIS: ACUTE PAIN  OUTPATIENT/OBSERVATION GOALS TO BE MET BEFORE DISCHARGE:  1. Pain Status: Improved-controlled with oral pain medications.    2. Return to near baseline physical activity: No    3. Cleared for discharge by consultants (if involved): Yes    Discharge Planner Nurse   Safe discharge environment identified: Yes  Barriers to discharge: Yes       Entered by: Nancy Vasques RN 10/02/2022 10:14 PM     Please review provider order for any additional goals.   Nurse to notify provider when observation goals have been met and patient is ready for discharge.

## 2022-10-03 NOTE — PLAN OF CARE
Problem: Pain Acute  Goal: Acceptable Pain Control and Functional Ability  Outcome: Ongoing, Progressing  Intervention: Develop Pain Management Plan  Recent Flowsheet Documentation  Taken 10/2/2022 1746 by Tierney Ocampo RN  Pain Management Interventions: medication (see MAR)  Taken 10/2/2022 1231 by Tierney Ocampo RN  Pain Management Interventions:   emotional support   distraction   declines     Goal Outcome Evaluation:        Pain controlled with scheduled tylenol.   No other complains.   Up to bathroom to void, no BM today.  PRIMARY DIAGNOSIS: Leg hematoma  OUTPATIENT/OBSERVATION GOALS TO BE MET BEFORE DISCHARGE:  ADLs back to baseline: Yes    Activity and level of assistance: Up with standby assistance and walker    Pain status: Improved-controlled with oral pain medications.    Return to near baseline physical activity: No     Discharge Planner Nurse   Safe discharge environment identified: Yes  Barriers to discharge: Yes.        Entered by: Tierney Lee RN 10/02/2022 8:14 PM     Please review provider order for any additional goals.   Nurse to notify provider when observation goals have been met and patient is ready for discharge.

## 2022-10-03 NOTE — PLAN OF CARE
Problem: Plan of Care - These are the overarching goals to be used throughout the patient stay.    Goal: Absence of Hospital-Acquired Illness or Injury  Intervention: Identify and Manage Fall Risk  Recent Flowsheet Documentation  Taken 10/3/2022 0052 by Nancy Vasques RN  Safety Promotion/Fall Prevention:   activity supervised   bed alarm on   nonskid shoes/slippers when out of bed  Taken 10/2/2022 2111 by Nancy Vasques RN  Safety Promotion/Fall Prevention:   activity supervised   bed alarm on   nonskid shoes/slippers when out of bed     Problem: Plan of Care - These are the overarching goals to be used throughout the patient stay.    Goal: Optimal Comfort and Wellbeing  Intervention: Monitor Pain and Promote Comfort  Recent Flowsheet Documentation  Taken 10/2/2022 2111 by Nancy Vasques RN  Pain Management Interventions: medication (see MAR)   Goal Outcome Evaluation:        Pt left thigh pain manage with prn oxycodone, tylenol and ice pack. No BM this shift. Assist of one with transfers. Pt slept well through the night.

## 2022-10-03 NOTE — PROGRESS NOTES
"PRIMARY DIAGNOSIS: \"GENERIC\" NURSING  OUTPATIENT/OBSERVATION GOALS TO BE MET BEFORE DISCHARGE:   1. ADLs back to baseline: Yes    2. Activity and level of assistance: Up with standby assistance.    3. Pain status: Improved-controlled with oral pain medications.    4. Return to near baseline physical activity: No     Discharge Planner Nurse   Safe discharge environment identified: Yes  Barriers to discharge: Yes- Pain control        Entered by: Robin Beltre RN 10/03/2022 9:38 AM     Please review provider order for any additional goals.   Nurse to notify provider when observation goals have been met and patient is ready for discharge.  "

## 2022-10-03 NOTE — PROGRESS NOTES
PRIMARY DIAGNOSIS: Leg hematoma  OUTPATIENT/OBSERVATION GOALS TO BE MET BEFORE DISCHARGE:  1. ADLs back to baseline: Yes    2. Activity and level of assistance: Up with standby assistance.    3. Pain status: Improved-controlled with oral pain medications.    4. Return to near baseline physical activity: No     Discharge Planner Nurse   Safe discharge environment identified: Yes  Barriers to discharge: Yes       Entered by: Tierney Lee RN 10/02/2022 8:16 PM     Please review provider order for any additional goals.   Nurse to notify provider when observation goals have been met and patient is ready for discharge.

## 2022-10-03 NOTE — PROGRESS NOTES
PRIMARY DIAGNOSIS: ACUTE PAIN  OUTPATIENT/OBSERVATION GOALS TO BE MET BEFORE DISCHARGE:  1. Pain Status: Improved-controlled with oral pain medications.    2. Return to near baseline physical activity: No    3. Cleared for discharge by consultants (if involved): Yes    Discharge Planner Nurse   Safe discharge environment identified: Yes  Barriers to discharge: Yes       Entered by: Nancy Vasques RN 10/03/2022 5:54 AM     Please review provider order for any additional goals.   Nurse to notify provider when observation goals have been met and patient is ready for discharge.

## 2022-10-03 NOTE — DISCHARGE SUMMARY
[facility]  Hospitalist Discharge Summary      Date of Admission:  9/29/2022  Date of Discharge:  10/3/2022  Discharging Provider: Isaac Gonzalez MD  Discharge Service: Hospitalist Service  62 year old female with past medical history of hypertension, alcohol and smoking abuse who presents to the ED for the evaluation of leg swelling.  She was admitted with lt  thigh contusion ,hematoma     Discharge Diagnoses   #Acute left thigh hematoma  -- Etiology is unclear, patient is not sure about trauma.  -- MRI of leg shows a large mass in the vastus intermedius muscle suggestive of acute hematoma with active extravasation  -- IR provider was contacted by ER physician and not recommending any intervention at this point, if hemoglobin is dropping then would order CTA and notify IR.  -- Discontinue home aspirin  -- No NSAIDs  -- Continue to monitor hemoglobin     #Left leg swelling  -- Secondary to hematoma  -- Negative ultrasound for DVT  -- Orthopedic was contacted by ER provider regarding concern for compartment syndrome who recommended continued observation and check CK level and use ice packs.     #Accelerated hypertension  -- Patient has history of hypertension but not currently taking any medication  -- Increase blood pressure in the ER, probably secondary to pain  -- Start Norvasc   -- Check echo  -- Add hydralazine IV as needed  -- Continue to monitor blood pressure     #Left leg pain  -- Secondary to acute hematoma  -- Avoid NSAIDs because of bleeding  -- Start scheduled Tylenol 3 times daily  -- Ice packing  -- Add oxycodone as needed  -- PT evaluation     #Hyponatremia  -- Probably secondary to poor oral intake  -- Gentle IV hydration overnight  -- Continue to monitor sodium level     #Smoking dependence  -- Discussed with patient to quit  -- Nicotine patch as needed     #Alcohol abuse  -- Patient drinks 3-4 beers every day  -- No sign or symptom of alcohol withdrawal  -- Continue to  "monitor closely    Follow-ups Needed After Discharge   Follow-up Appointments     Follow-up and recommended labs and tests       Follow up with primary care provider, Erick Ferro MD, within 7 days   for hospital follow- up.  No follow up labs or test are needed.         {Additional follow-up instructions/to-do's for PCP    none    Unresulted Labs Ordered in the Past 30 Days of this Admission     No orders found from 8/30/2022 to 9/30/2022.      These results will be followed up by pcp    Discharge Disposition   Discharged to home  Condition at discharge: {CONDITION:\"Stable      Hospital Course            62 year old female with past medical history of hypertension, alcohol and smoking abuse who presents to the ED for the evaluation of leg swelling.  She was admitted with,     #Acute left thigh hematoma  -- Etiology is unclear, patient is not sure about trauma.  -- MRI of leg shows a large mass in the vastus intermedius muscle suggestive of acute hematoma with active extravasation  -- IR provider was contacted by ER physician and not recommending any intervention at this point, if hemoglobin is dropping then would order CTA and notify IR.  -- Discontinue home aspirin  -- No NSAIDs  -- Continue to monitor hemoglobin     #Left leg swelling  -- Secondary to hematoma  -- Negative ultrasound for DVT  -- Orthopedic was contacted by ER provider regarding concern for compartment syndrome who recommended continued observation and check CK level and use ice packs.     #Accelerated hypertension  -- Patient has history of hypertension but not currently taking any medication  -- Increase blood pressure in the ER, probably secondary to pain  -- Start Norvasc   -- Check echo  -- Add hydralazine IV as needed  -- Continue to monitor blood pressure     #Left leg pain  -- Secondary to acute hematoma  -- Avoid NSAIDs because of bleeding  -- Start scheduled Tylenol 3 times daily  -- Ice packing  -- Add oxycodone as needed  -- PT " evaluation     #Hyponatremia  -- Probably secondary to poor oral intake  -- Gentle IV hydration overnight  -- Continue to monitor sodium level     #Smoking dependence  -- Discussed with patient to quit  -- Nicotine patch as needed     #Alcohol abuse  -- Patient drinks 3-4 beers every day  -- No sign or symptom of alcohol withdrawal  -- Continue to monitor closely   plan:  Start lactulose ,will discharge home once she has bowel movement     Consultations This Hospital Stay   PHYSICAL THERAPY ADULT IP CONSULT  CARE MANAGEMENT / SOCIAL WORK IP CONSULT    Code Status   Full Code    Time Spent on this Encounter   I, Isaac Gonzalez MD, personally saw the patient today and spent greater than 30 minutes discharging this patient.       Isaac Gonzalez MD  [address] [address]  Phone: [phone]  Fax: [phone]  ______________________________________________________________________    Physical Exam   Vital Signs: Temp: 99.3  F (37.4  C) Temp src: Oral BP: (!) 145/81 Pulse: 79   Resp: 20 SpO2: 99 % O2 Device: None (Room air)    Weight: 145 lbs 0 oz  GENERAL: Alert and oriented x 3; no acute distress; well-nourished.  HEENT: Normocephalic; atraumatic; PERRLA; MMM.  CV: RRR; normal S1, S2; no rubs, murmurs, or gallops.  RESP: Lung fields clear to aucultation B/L; no wheezing or crepitations.  GI: Abdomen is soft, nontender, nondistended; no organomegaly; normal bowel sounds.  : Deferred genital examination.   MSK: Left thigh has increased circumference as compared to right.  DERM: Skin is intact; no rash, lesions, or skin breakdown.  NEURO: No focal deficits appreciated; strength & sensorium are grossly intact.  PSYCH: No active hallucinations; affect, insight appear within normal limits.       Primary Care Physician   Erick Ferro MD    Discharge Orders      Reason for your hospital stay    Lt thigh contusion ,hematoma     Follow-up and recommended labs  and tests     Follow up with primary care provider, Erick Ferro MD, within 7 days for hospital follow- up.  No follow up labs or test are needed.     Activity    Your activity upon discharge: activity as tolerated     Diet    Follow this diet upon discharge: Orders Placed This Encounter      Regular Diet Adult       Significant Results and Procedures   Most Recent 3 CBC's:Recent Labs   Lab Test 10/01/22  0730 09/30/22  1050 09/29/22  2333 09/29/22  1756   WBC 7.4 9.5  --  11.1*   HGB 10.8* 12.3 12.5 13.2   MCV 92 91  --  91    335  --  334       Discharge Medications   Current Discharge Medication List      START taking these medications    Details   acetaminophen (TYLENOL) 325 MG tablet Take 2 tablets (650 mg) by mouth every 8 hours  Qty: 50 tablet, Refills: 0    Associated Diagnoses: Hematoma of left thigh, initial encounter      amLODIPine (NORVASC) 2.5 MG tablet Take 1 tablet (2.5 mg) by mouth daily  Qty: 90 tablet, Refills: 0    Associated Diagnoses: Hematoma of left thigh, initial encounter      nicotine (NICODERM CQ) 21 MG/24HR 24 hr patch Place 1 patch onto the skin daily as needed for smoking cessation  Qty: 30 patch, Refills: 0    Associated Diagnoses: Hematoma of left thigh, initial encounter      oxyCODONE (ROXICODONE) 5 MG tablet Take 1 tablet (5 mg) by mouth every 6 hours as needed for moderate to severe pain or pain  Qty: 20 tablet, Refills: 0    Associated Diagnoses: Hematoma of left thigh, initial encounter      thiamine 50 MG TABS Take 1 tablet (50 mg) by mouth daily  Qty: 30 tablet, Refills: 0    Associated Diagnoses: Hematoma of left thigh, initial encounter         CONTINUE these medications which have NOT CHANGED    Details   aspirin 81 MG EC tablet Take 81 mg by mouth daily      Loratadine-Pseudoephedrine (CLARITIN-D 12 HOUR PO) Take 1 tablet by mouth daily as needed         STOP taking these medications       ibuprofen (ADVIL/MOTRIN) 200 MG tablet Comments:   Reason for Stopping:              Allergies   Allergies   Allergen Reactions     Ciprofloxacin      Rash and hives.     Seasonal Allergies      Polymyxin B Hives and Rash

## 2022-10-10 ENCOUNTER — OFFICE VISIT (OUTPATIENT)
Dept: FAMILY MEDICINE | Facility: CLINIC | Age: 62
End: 2022-10-10
Payer: COMMERCIAL

## 2022-10-10 VITALS
TEMPERATURE: 98.6 F | WEIGHT: 141.8 LBS | OXYGEN SATURATION: 99 % | BODY MASS INDEX: 22.21 KG/M2 | DIASTOLIC BLOOD PRESSURE: 74 MMHG | HEART RATE: 87 BPM | SYSTOLIC BLOOD PRESSURE: 128 MMHG

## 2022-10-10 DIAGNOSIS — I47.10 PAROXYSMAL SUPRAVENTRICULAR TACHYCARDIA (H): ICD-10-CM

## 2022-10-10 DIAGNOSIS — I47.29 PAROXYSMAL VENTRICULAR TACHYCARDIA (H): ICD-10-CM

## 2022-10-10 DIAGNOSIS — R00.2 PALPITATIONS: ICD-10-CM

## 2022-10-10 DIAGNOSIS — R55 VASOVAGAL SYNCOPE: ICD-10-CM

## 2022-10-10 DIAGNOSIS — S70.12XA HEMATOMA OF LEFT THIGH, INITIAL ENCOUNTER: Primary | ICD-10-CM

## 2022-10-10 LAB
ALBUMIN SERPL BCG-MCNC: 4 G/DL (ref 3.5–5.2)
ALP SERPL-CCNC: 68 U/L (ref 35–104)
ALT SERPL W P-5'-P-CCNC: 14 U/L (ref 10–35)
ANION GAP SERPL CALCULATED.3IONS-SCNC: 11 MMOL/L (ref 7–15)
AST SERPL W P-5'-P-CCNC: 26 U/L (ref 10–35)
BASOPHILS # BLD AUTO: 0 10E3/UL (ref 0–0.2)
BASOPHILS NFR BLD AUTO: 0 %
BILIRUB SERPL-MCNC: 0.9 MG/DL
BUN SERPL-MCNC: 6.5 MG/DL (ref 8–23)
CALCIUM SERPL-MCNC: 9.2 MG/DL (ref 8.8–10.2)
CHLORIDE SERPL-SCNC: 101 MMOL/L (ref 98–107)
CREAT SERPL-MCNC: 0.49 MG/DL (ref 0.51–0.95)
CRP SERPL-MCNC: 19.64 MG/L
DEPRECATED HCO3 PLAS-SCNC: 27 MMOL/L (ref 22–29)
EOSINOPHIL # BLD AUTO: 0.2 10E3/UL (ref 0–0.7)
EOSINOPHIL NFR BLD AUTO: 2 %
ERYTHROCYTE [DISTWIDTH] IN BLOOD BY AUTOMATED COUNT: 12.9 % (ref 10–15)
GFR SERPL CREATININE-BSD FRML MDRD: >90 ML/MIN/1.73M2
GLUCOSE SERPL-MCNC: 98 MG/DL (ref 70–99)
HCT VFR BLD AUTO: 31.9 % (ref 35–47)
HGB BLD-MCNC: 10.7 G/DL (ref 11.7–15.7)
IMM GRANULOCYTES # BLD: 0 10E3/UL
IMM GRANULOCYTES NFR BLD: 0 %
LYMPHOCYTES # BLD AUTO: 1.3 10E3/UL (ref 0.8–5.3)
LYMPHOCYTES NFR BLD AUTO: 15 %
MCH RBC QN AUTO: 31.7 PG (ref 26.5–33)
MCHC RBC AUTO-ENTMCNC: 33.5 G/DL (ref 31.5–36.5)
MCV RBC AUTO: 94 FL (ref 78–100)
MONOCYTES # BLD AUTO: 0.7 10E3/UL (ref 0–1.3)
MONOCYTES NFR BLD AUTO: 8 %
NEUTROPHILS # BLD AUTO: 6.4 10E3/UL (ref 1.6–8.3)
NEUTROPHILS NFR BLD AUTO: 75 %
NRBC # BLD AUTO: 0 10E3/UL
NRBC BLD AUTO-RTO: 0 /100
PLATELET # BLD AUTO: 421 10E3/UL (ref 150–450)
POTASSIUM SERPL-SCNC: 4.4 MMOL/L (ref 3.4–5.3)
PROT SERPL-MCNC: 7.1 G/DL (ref 6.4–8.3)
RBC # BLD AUTO: 3.38 10E6/UL (ref 3.8–5.2)
SODIUM SERPL-SCNC: 139 MMOL/L (ref 136–145)
WBC # BLD AUTO: 8.6 10E3/UL (ref 4–11)

## 2022-10-10 PROCEDURE — 99214 OFFICE O/P EST MOD 30 MIN: CPT | Performed by: PHYSICIAN ASSISTANT

## 2022-10-10 PROCEDURE — 80053 COMPREHEN METABOLIC PANEL: CPT | Performed by: PHYSICIAN ASSISTANT

## 2022-10-10 PROCEDURE — 36415 COLL VENOUS BLD VENIPUNCTURE: CPT | Performed by: PHYSICIAN ASSISTANT

## 2022-10-10 PROCEDURE — 86140 C-REACTIVE PROTEIN: CPT | Performed by: PHYSICIAN ASSISTANT

## 2022-10-10 PROCEDURE — 85025 COMPLETE CBC W/AUTO DIFF WBC: CPT | Performed by: PHYSICIAN ASSISTANT

## 2022-10-10 NOTE — PROGRESS NOTES
Assessment & Plan     ASSESSMENT/PLAN:      ICD-10-CM    1. Hematoma of left thigh, initial encounter  S70.12XA CBC with platelets and differential     Comprehensive metabolic panel (BMP + Alb, Alk Phos, ALT, AST, Total. Bili, TP)     CRP, inflammation     CBC with platelets and differential     Comprehensive metabolic panel (BMP + Alb, Alk Phos, ALT, AST, Total. Bili, TP)     CRP, inflammation     MR Femur Thigh Left wo & w Contrast      2. Palpitations  R00.2 Adult Leadless EKG Monitor 8 to 14 Days      3. Vasovagal syncope  R55 Adult Leadless EKG Monitor 8 to 14 Days        Symptoms are slowly improving, in terms of pain and range of motion.  Recommended close follow up. Red flag signs to be seen urgently were discussed.  Incidentally will check zio patch due to chronic palpitations about once a week. She did also recmember that she had vasovagal syncope 3 weeks prior to her ER visit (in the middle of the night after getting up off the toilet).  Will monitor blood pressure, improved during visit.    Patient Instructions   Cardiac monitor: To schedule cardiac procedure, please call: [phone]  MRI in 3 months: Please contact [facility]  at [phone] to schedule appointment    (Would recommend sooner if any worsening/concerns)    Return in about 2 weeks (around 10/24/2022).    Lauren Weldon PA-C  [facility] LARS Monet is a 62 year old accompanied by her sister, Natalie, presenting for the following health issues:  ER F/U    [facility]     Hospital Follow-up Visit:    Hospital/Nursing Home/IP Rehab Facility: [facility]  Date of Admission: 9/29/2022  Date of Discharge: 10/3/2022  Reason(s) for Admission: Lt thigh contusion, hematoma    Was your hospitalization related to COVID-19? No   Problems taking medications regularly:  None  Medication changes since discharge: Not taking Oxicodone. Hasn't started Amlodipine - wanted to  discuss with you first   Problems adhering to non-medication therapy:  None  [facility] hospital discharge summary reviewed    Summary of hospitalization:    Diagnostic Tests/Treatments reviewed.  Follow up needed: hgb  Other Healthcare Providers Involved in Patient s Care:         None  Update since discharge: improved.   Post Medication Reconciliation Status:        Plan of care communicated with patient and family    She is a  at [employer], not able to work at this time  Can only stand short amounts of time, still walking with crutch  Wonders if it started by bumping table at work which seemed to cause a charley horse for 2 weeks.  Also passed out in th emiddle of the night while getting up from the bathroom a few weeks prior, had forgotten about that  Chronically has some ankle swelling when she is on them a long time  Doing pT exercises from hospital - Limited lifting leg still  Normal for her is BM every 2 days. Has been back to normal.  Has been doing 1/2 or 1 tylenol every 8 hours.   Stopped oxycodone when she got home  Pain will come if in one position too long but is improved.  Not too tired, but a little lightheaded when standing    Did remember that 3 weeks prior to ER she fainted in the middle of the night  When stressed she sometimes has palpitations/racing and lightheadedness. Probably once a week.   History of inner ear issues       Review of Systems   Other than noted above, general, HEENT, respiratory, cardiac, MS, and gastrointestinal systems are negative.       Objective    /74   Pulse 87   Temp 98.6  F (37  C) (Tympanic)   Wt 64.3 kg (141 lb 12.8 oz)   SpO2 99%   BMI 22.21 kg/m    Body mass index is 22.21 kg/m .  Physical Exam   GENERAL: healthy, alert and no distress  HENT: ear canals and TM's normal, nose and mouth without ulcers or lesions  NECK: no adenopathy, no asymmetry, masses, or scars and thyroid normal to palpation  RESP: lungs clear to auscultation -  no rales, rhonchi or wheezes  CV: regular rate and rhythm, normal S1 S2, no S3 or S4, no murmur, click or rub, no peripheral edema and peripheral pulses strong  ABDOMEN: soft, nontender, no hepatosplenomegaly, no masses and bowel sounds normal  MS: no gross musculoskeletal defects noted, no edema  POSITIVE left thigh shows mild ecchymosis (fainter per patient), as well as tender swelling(hematoma) mid thigh. Limited ROM of leg due to pain, patient walking with crutch. Painful to bear weight and to flex hip. No edema. No erythema

## 2022-10-10 NOTE — LETTER
October 17, 2022      Tierney Pearson  [address] [place] [address]        Dear Tierney,    We are writing to inform you of your test results.  Your sodium is back in the normal range.   Your crp (inflammatory marker) is still a little high, pretty stable from before.   Other labs are stable.   We can repeat at your follow up visit.     Resulted Orders   Comprehensive metabolic panel (BMP + Alb, Alk Phos, ALT, AST, Total. Bili, TP)   Result Value Ref Range    Sodium 139 136 - 145 mmol/L    Potassium 4.4 3.4 - 5.3 mmol/L    Chloride 101 98 - 107 mmol/L    Carbon Dioxide (CO2) 27 22 - 29 mmol/L    Anion Gap 11 7 - 15 mmol/L    Urea Nitrogen 6.5 (L) 8.0 - 23.0 mg/dL    Creatinine 0.49 (L) 0.51 - 0.95 mg/dL    Calcium 9.2 8.8 - 10.2 mg/dL    Glucose 98 70 - 99 mg/dL    Alkaline Phosphatase 68 35 - 104 U/L    AST 26 10 - 35 U/L    ALT 14 10 - 35 U/L    Protein Total 7.1 6.4 - 8.3 g/dL    Albumin 4.0 3.5 - 5.2 g/dL    Bilirubin Total 0.9 <=1.2 mg/dL    GFR Estimate >90 >60 mL/min/1.73m2      Comment:      Effective December 21, 2021 eGFRcr in adults is calculated using the 2021 CKD-EPI creatinine equation which includes age and gender (Maryuri doherty al., [place], DOI: 10.1056/KZNTfb4875607)   CRP, inflammation   Result Value Ref Range    CRP Inflammation 19.64 (H) <5.00 mg/L   CBC with platelets and differential   Result Value Ref Range    WBC Count 8.6 4.0 - 11.0 10e3/uL    RBC Count 3.38 (L) 3.80 - 5.20 10e6/uL    Hemoglobin 10.7 (L) 11.7 - 15.7 g/dL    Hematocrit 31.9 (L) 35.0 - 47.0 %    MCV 94 78 - 100 fL    MCH 31.7 26.5 - 33.0 pg    MCHC 33.5 31.5 - 36.5 g/dL    RDW 12.9 10.0 - 15.0 %    Platelet Count 421 150 - 450 10e3/uL    % Neutrophils 75 %    % Lymphocytes 15 %    % Monocytes 8 %    % Eosinophils 2 %    % Basophils 0 %    % Immature Granulocytes 0 %    NRBCs per 100 WBC 0 <1 /100    Absolute Neutrophils 6.4 1.6 - 8.3 10e3/uL    Absolute Lymphocytes 1.3 0.8 - 5.3 10e3/uL    Absolute Monocytes 0.7 0.0  - 1.3 10e3/uL    Absolute Eosinophils 0.2 0.0 - 0.7 10e3/uL    Absolute Basophils 0.0 0.0 - 0.2 10e3/uL    Absolute Immature Granulocytes 0.0 <=0.4 10e3/uL    Absolute NRBCs 0.0 10e3/uL       If you have any questions or concerns, please call the clinic at the number listed above.       Sincerely,      Lauren Weldon PA-C

## 2022-10-10 NOTE — PATIENT INSTRUCTIONS
Cardiac monitor: To schedule cardiac procedure, please call: [phone]  MRI in 3 months: Please contact [facility]  at [phone] to schedule appointment    (Would recommend sooner if any worsening/concerns)

## 2022-10-11 ENCOUNTER — HOSPITAL ENCOUNTER (OUTPATIENT)
Dept: CARDIOLOGY | Facility: CLINIC | Age: 62
Discharge: HOME | End: 2022-10-11
Attending: PHYSICIAN ASSISTANT | Admitting: PHYSICIAN ASSISTANT
Payer: COMMERCIAL

## 2022-10-11 DIAGNOSIS — R00.2 PALPITATIONS: ICD-10-CM

## 2022-10-11 DIAGNOSIS — R55 VASOVAGAL SYNCOPE: ICD-10-CM

## 2022-10-11 PROCEDURE — 93246 EXT ECG>7D<15D RECORDING: CPT

## 2022-10-11 PROCEDURE — 93248 EXT ECG>7D<15D REV&INTERPJ: CPT | Performed by: INTERNAL MEDICINE

## 2022-10-26 ENCOUNTER — OFFICE VISIT (OUTPATIENT)
Dept: FAMILY MEDICINE | Facility: CLINIC | Age: 62
End: 2022-10-26
Payer: COMMERCIAL

## 2022-10-26 VITALS
RESPIRATION RATE: 17 BRPM | WEIGHT: 139.4 LBS | HEART RATE: 79 BPM | HEIGHT: 67 IN | SYSTOLIC BLOOD PRESSURE: 138 MMHG | DIASTOLIC BLOOD PRESSURE: 80 MMHG | BODY MASS INDEX: 21.88 KG/M2 | OXYGEN SATURATION: 99 % | TEMPERATURE: 98.6 F

## 2022-10-26 DIAGNOSIS — Z11.4 SCREENING FOR HIV (HUMAN IMMUNODEFICIENCY VIRUS): ICD-10-CM

## 2022-10-26 DIAGNOSIS — S70.12XA HEMATOMA OF LEFT THIGH, INITIAL ENCOUNTER: Primary | ICD-10-CM

## 2022-10-26 DIAGNOSIS — Z11.59 ENCOUNTER FOR HEPATITIS C SCREENING TEST FOR LOW RISK PATIENT: ICD-10-CM

## 2022-10-26 DIAGNOSIS — Z13.220 SCREENING FOR LIPOID DISORDERS: ICD-10-CM

## 2022-10-26 DIAGNOSIS — I10 PRIMARY HYPERTENSION: ICD-10-CM

## 2022-10-26 LAB
ANION GAP SERPL CALCULATED.3IONS-SCNC: 11 MMOL/L (ref 7–15)
BASOPHILS # BLD AUTO: 0 10E3/UL (ref 0–0.2)
BASOPHILS NFR BLD AUTO: 0 %
BUN SERPL-MCNC: 5.3 MG/DL (ref 8–23)
CALCIUM SERPL-MCNC: 9.5 MG/DL (ref 8.8–10.2)
CHLORIDE SERPL-SCNC: 99 MMOL/L (ref 98–107)
CHOLEST SERPL-MCNC: 188 MG/DL
CREAT SERPL-MCNC: 0.53 MG/DL (ref 0.51–0.95)
CRP SERPL-MCNC: <3 MG/L
DEPRECATED HCO3 PLAS-SCNC: 25 MMOL/L (ref 22–29)
EOSINOPHIL # BLD AUTO: 0.1 10E3/UL (ref 0–0.7)
EOSINOPHIL NFR BLD AUTO: 1 %
ERYTHROCYTE [DISTWIDTH] IN BLOOD BY AUTOMATED COUNT: 13.2 % (ref 10–15)
GFR SERPL CREATININE-BSD FRML MDRD: >90 ML/MIN/1.73M2
GLUCOSE SERPL-MCNC: 91 MG/DL (ref 70–99)
HCT VFR BLD AUTO: 41 % (ref 35–47)
HCV AB SERPL QL IA: NONREACTIVE
HDLC SERPL-MCNC: 76 MG/DL
HGB BLD-MCNC: 13.6 G/DL (ref 11.7–15.7)
HIV 1+2 AB+HIV1 P24 AG SERPL QL IA: NONREACTIVE
LDLC SERPL CALC-MCNC: 95 MG/DL
LYMPHOCYTES # BLD AUTO: 1.2 10E3/UL (ref 0.8–5.3)
LYMPHOCYTES NFR BLD AUTO: 15 %
MCH RBC QN AUTO: 31.5 PG (ref 26.5–33)
MCHC RBC AUTO-ENTMCNC: 33.2 G/DL (ref 31.5–36.5)
MCV RBC AUTO: 95 FL (ref 78–100)
MONOCYTES # BLD AUTO: 0.7 10E3/UL (ref 0–1.3)
MONOCYTES NFR BLD AUTO: 9 %
NEUTROPHILS # BLD AUTO: 6.3 10E3/UL (ref 1.6–8.3)
NEUTROPHILS NFR BLD AUTO: 75 %
NONHDLC SERPL-MCNC: 112 MG/DL
PLATELET # BLD AUTO: 432 10E3/UL (ref 150–450)
POTASSIUM SERPL-SCNC: 4 MMOL/L (ref 3.4–5.3)
RBC # BLD AUTO: 4.32 10E6/UL (ref 3.8–5.2)
SODIUM SERPL-SCNC: 135 MMOL/L (ref 136–145)
TRIGL SERPL-MCNC: 85 MG/DL
WBC # BLD AUTO: 8.4 10E3/UL (ref 4–11)

## 2022-10-26 PROCEDURE — 36415 COLL VENOUS BLD VENIPUNCTURE: CPT | Performed by: PHYSICIAN ASSISTANT

## 2022-10-26 PROCEDURE — 99213 OFFICE O/P EST LOW 20 MIN: CPT | Performed by: PHYSICIAN ASSISTANT

## 2022-10-26 PROCEDURE — 80048 BASIC METABOLIC PNL TOTAL CA: CPT | Performed by: PHYSICIAN ASSISTANT

## 2022-10-26 PROCEDURE — 86140 C-REACTIVE PROTEIN: CPT | Performed by: PHYSICIAN ASSISTANT

## 2022-10-26 PROCEDURE — 85025 COMPLETE CBC W/AUTO DIFF WBC: CPT | Performed by: PHYSICIAN ASSISTANT

## 2022-10-26 PROCEDURE — 87389 HIV-1 AG W/HIV-1&-2 AB AG IA: CPT | Performed by: PHYSICIAN ASSISTANT

## 2022-10-26 PROCEDURE — 80061 LIPID PANEL: CPT | Performed by: PHYSICIAN ASSISTANT

## 2022-10-26 PROCEDURE — 86803 HEPATITIS C AB TEST: CPT | Performed by: PHYSICIAN ASSISTANT

## 2022-10-26 NOTE — LETTER
October 31, 2022      Tierney Pearson  [address] [place] [address]        Dear ,    We are writing to inform you of your test results.    Here are your cholesterol results: Your LDL (bad cholesterol) is 95 (normal is less than 130).  Your HDL (good cholesterol) is 76 (normal for women is 50 or greater, normal for men is 40 or greater).  Your triglycerides (fats in the blood) are 85 (normal is less than 150).     Some ways to lower your bad cholesterol and raise your good cholesterol include eating a diet that is lower in animal fats and higher in fruits, vegetables, and fiber, and getting 30 minutes of aerobic exercise most days of the week.   In general, Mediterranean style diet is recommended.     Based on your age, race, smoking status, blood-pressure, and cholesterol levels your calculated 10 year risk for a vascular event (heart attack/stroke) is 5%.   Generally we will recommend starting a cholesterol medication once that risk is about 7.5%.     CRP (inflammatory marker) is back to normal! CBC (blood counts) are also back to normal.     HIV/hepatitis C is negative.     Sodium is still slightly low, we can recheck this in a couple months.     Taylor Weldon PA-C/edwin    Resulted Orders   Lipid panel reflex to direct LDL Non-fasting   Result Value Ref Range    Cholesterol 188 <200 mg/dL    Triglycerides 85 <150 mg/dL    Direct Measure HDL 76 >=50 mg/dL    LDL Cholesterol Calculated 95 <=100 mg/dL    Non HDL Cholesterol 112 <130 mg/dL    Narrative    Cholesterol  Desirable:  <200 mg/dL    Triglycerides  Normal:  Less than 150 mg/dL  Borderline High:  150-199 mg/dL  High:  200-499 mg/dL  Very High:  Greater than or equal to 500 mg/dL    Direct Measure HDL  Female:  Greater than or equal to 50 mg/dL   Male:  Greater than or equal to 40 mg/dL    LDL Cholesterol  Desirable:  <100mg/dL  Above Desirable:  100-129 mg/dL   Borderline High:  130-159 mg/dL   High:  160-189 mg/dL   Very High:  >=  190 mg/dL    Non HDL Cholesterol  Desirable:  130 mg/dL  Above Desirable:  130-159 mg/dL  Borderline High:  160-189 mg/dL  High:  190-219 mg/dL  Very High:  Greater than or equal to 220 mg/dL   CRP, inflammation   Result Value Ref Range    CRP Inflammation <3.00 <5.00 mg/L   Basic metabolic panel  (Ca, Cl, CO2, Creat, Gluc, K, Na, BUN)   Result Value Ref Range    Sodium 135 (L) 136 - 145 mmol/L    Potassium 4.0 3.4 - 5.3 mmol/L    Chloride 99 98 - 107 mmol/L    Carbon Dioxide (CO2) 25 22 - 29 mmol/L    Anion Gap 11 7 - 15 mmol/L    Urea Nitrogen 5.3 (L) 8.0 - 23.0 mg/dL    Creatinine 0.53 0.51 - 0.95 mg/dL    Calcium 9.5 8.8 - 10.2 mg/dL    Glucose 91 70 - 99 mg/dL    GFR Estimate >90 >60 mL/min/1.73m2      Comment:      Effective December 21, 2021 eGFRcr in adults is calculated using the 2021 CKD-EPI creatinine equation which includes age and gender (Maryuri doherty al., NE, DOI: 10.1056/HQZSvp5844901)   HIV Antigen Antibody Combo   Result Value Ref Range    HIV Antigen Antibody Combo Nonreactive Nonreactive      Comment:      HIV-1 p24 Ag & HIV-1/HIV-2 Ab Not Detected   Hepatitis C Screen Reflex to HCV RNA Quant and Genotype   Result Value Ref Range    Hepatitis C Antibody Nonreactive Nonreactive    Narrative    Assay performance characteristics have not been established for newborns, infants, and children.   CBC with platelets and differential   Result Value Ref Range    WBC Count 8.4 4.0 - 11.0 10e3/uL    RBC Count 4.32 3.80 - 5.20 10e6/uL    Hemoglobin 13.6 11.7 - 15.7 g/dL    Hematocrit 41.0 35.0 - 47.0 %    MCV 95 78 - 100 fL    MCH 31.5 26.5 - 33.0 pg    MCHC 33.2 31.5 - 36.5 g/dL    RDW 13.2 10.0 - 15.0 %    Platelet Count 432 150 - 450 10e3/uL    % Neutrophils 75 %    % Lymphocytes 15 %    % Monocytes 9 %    % Eosinophils 1 %    % Basophils 0 %    Absolute Neutrophils 6.3 1.6 - 8.3 10e3/uL    Absolute Lymphocytes 1.2 0.8 - 5.3 10e3/uL    Absolute Monocytes 0.7 0.0 - 1.3 10e3/uL    Absolute Eosinophils 0.1 0.0 -  0.7 10e3/uL    Absolute Basophils 0.0 0.0 - 0.2 10e3/uL

## 2022-10-26 NOTE — PROGRESS NOTES
Assessment & Plan     ASSESSMENT/PLAN:      ICD-10-CM    1. Hematoma of left thigh, initial encounter  S70.12XA Physical Therapy Referral     CRP, inflammation     CBC with platelets and differential      2. Primary hypertension  I10 Basic metabolic panel  (Ca, Cl, CO2, Creat, Gluc, K, Na, BUN)      3. Screening for lipoid disorders  Z13.220 Lipid panel reflex to direct LDL Non-fasting      4. Screening for HIV (human immunodeficiency virus)  Z11.4 HIV Antigen Antibody Combo      5. Encounter for hepatitis C screening test for low risk patient  Z11.59 Hepatitis C Screen Reflex to HCV RNA Quant and Genotype        Forms filled out, return to work 11/9/22, with restrictions through 11/23/22. Follow up in 2 weeks, also recommended patient schedule preventive visit.  Symptoms slowly improving. Increased ROM, walking better. Will recheck labs today. Continue with plan for MRI in 3 months. Also placed physical therapy referral for ROM/strengthening.  Awaiting results of zio patch.    Return in about 2 weeks (around 11/9/2022).    Lauren Weldon PA-C  [facility] LARS Monet is a 62 year old, presenting for the following health issues:  Hospital F/U      History of Present Illness       Reason for visit:  Follow up for 10 10 visit    She eats 2-3 servings of fruits and vegetables daily.She consumes 3 sweetened beverage(s) daily.She exercises with enough effort to increase her heart rate 20 to 29 minutes per day.  She exercises with enough effort to increase her heart rate 4 days per week.   She is taking medications regularly.      Hospital Follow-up Visit:    Hospital/Nursing Home/IP Rehab Facility: [facility]  Date of Admission: 9/29/2022  Date of Discharge: 10/3/2022  Reason(s) for Admission: Leg swelling, acute hematoma    *  Overall things have been getting better, still has some pains    Was your hospitalization related to COVID-19? No   Problems taking  "medications regularly:  None  Medication changes since discharge: None  Problems adhering to non-medication therapy:  None    Summary of hospitalization:  [facility] hospital discharge summary reviewed  Diagnostic Tests/Treatments reviewed.  Follow up needed: MRI 3 months  Other Healthcare Providers Involved in Patient s Care:         None  Update since discharge: improved. Plan of care communicated with patient     * she had been walking funny with weight put on inner thigh but is walking more normally now. Using a walking stick rather than leaning on the crutch. Doing exercises regularly that physical therapy in the hospital had given her, and walking around outside. Still not able to walk easily or return to her physical job.   * also had infected spider bite left lower leg that lasted a month and a half, and improved right before hospital visit  * mailed in [employer] patch yesterday. A couple times did feel dizzy/lightheaded/heart pounding. With straining (BM, carrying boxes while walking far/felt dehydrated). No chest pain, no SOB.    At  Work she walks/stands 7.5 hours/day normally  She is walking very slowly, using walking stick    Review of Systems   Other than noted above, general, HEENT, respiratory, cardiac, MS, and gastrointestinal systems are negative.       Objective    /80   Pulse 79   Temp 98.6  F (37  C) (Tympanic)   Resp 17   Ht 1.702 m (5' 7\")   Wt 63.2 kg (139 lb 6.4 oz)   SpO2 99%   BMI 21.83 kg/m    Body mass index is 21.83 kg/m .  Physical Exam   GENERAL: healthy, alert and no distress  RESP: lungs clear to auscultation - no rales, rhonchi or wheezes  CV: regular rate and rhythm, normal S1 S2, no S3 or S4, no murmur, click or rub, no peripheral edema and peripheral pulses strong  ABDOMEN: soft, nontender, no hepatosplenomegaly, no masses and bowel sounds normal  MS: no gross musculoskeletal defects noted, no edema  POSITIVE left thigh shows hematoma smaller (about 3-4 inches), " tender. Patient walks without limp, but slowly and carefully. Increased ROM but still difficulty with hip flexion. Less pain but still tenderness.

## 2022-11-07 ENCOUNTER — VIRTUAL VISIT (OUTPATIENT)
Dept: FAMILY MEDICINE | Facility: CLINIC | Age: 62
End: 2022-11-07
Payer: COMMERCIAL

## 2022-11-07 DIAGNOSIS — S70.12XA HEMATOMA OF LEFT THIGH, INITIAL ENCOUNTER: Primary | ICD-10-CM

## 2022-11-07 PROCEDURE — 99213 OFFICE O/P EST LOW 20 MIN: CPT | Mod: TEL | Performed by: PHYSICIAN ASSISTANT

## 2022-11-07 NOTE — PROGRESS NOTES
Tierney is a 62 year old who is being evaluated via a billable telephone visit.      What phone number would you like to be contacted at? [phone]  How would you like to obtain your AVS? Mail a copy    Assessment & Plan     ASSESSMENT/PLAN:      ICD-10-CM    1. Hematoma of left thigh, initial encounter  S70.12XA         Much improved. Will let me know if she needs updated letter/forms for work. Plan to return to full duty 11/24/22, as they would not let her return with restrictions.  Also recommended schedule preventive visit. Discuss follow up MRI at that time.    Return in about 2 months (around 1/7/2023) for Physical Exam.    Lauren Weldon PA-C  [facility]   Tierney is a 62 year old, presenting for the following health issues:  RECHECK      History of Present Illness       Reason for visit:  Follow up for 10 10 visit    She eats 2-3 servings of fruits and vegetables daily.She consumes 3 sweetened beverage(s) daily.She exercises with enough effort to increase her heart rate 20 to 29 minutes per day.  She exercises with enough effort to increase her heart rate 4 days per week.   She is taking medications regularly.     *  Follow up hematoma left thigh, things are going really well she has been able to walk better and has been doing her exercises    - has evaluation by cardiology in December  - she is walking a lot more without the cane, has it in hand just in case. Doing her exercises, strengthening. Pain is a lot better. Occasional tylenol.  - work will not take her back with restrictions. They recommended come back 11/24 with no restrictions.  - does have physical therapy evaluation scheduled     Review of Systems   Other than noted above, general, HEENT, respiratory, cardiac, MS, and gastrointestinal systems are negative.       Objective           Vitals:  No vitals were obtained today due to virtual visit.    Physical Exam   healthy, alert and no distress  PSYCH: Alert and  oriented times 3; coherent speech, normal   rate and volume, able to articulate logical thoughts, able   to abstract reason, no tangential thoughts, no hallucinations   or delusions  Her affect is normal  RESP: No cough, no audible wheezing, able to talk in full sentences  Remainder of exam unable to be completed due to telephone visits        Phone call duration: 10 minutes

## 2022-11-08 ENCOUNTER — TELEPHONE (OUTPATIENT)
Dept: FAMILY MEDICINE | Facility: CLINIC | Age: 62
End: 2022-11-08

## 2022-11-08 NOTE — TELEPHONE ENCOUNTER
FYI - Status Update    Who is Calling: patient    Update: Patient's employee health would like an update on patient's return to work date. Fax to Jessica, fax [phone]      Does caller want a call/response back: No

## 2022-11-16 ENCOUNTER — THERAPY VISIT (OUTPATIENT)
Dept: PHYSICAL THERAPY | Facility: CLINIC | Age: 62
End: 2022-11-16
Attending: PHYSICIAN ASSISTANT
Payer: COMMERCIAL

## 2022-11-16 DIAGNOSIS — S70.12XA HEMATOMA OF LEFT THIGH, INITIAL ENCOUNTER: ICD-10-CM

## 2022-11-16 DIAGNOSIS — M79.652 PAIN OF LEFT THIGH: ICD-10-CM

## 2022-11-16 PROCEDURE — 97161 PT EVAL LOW COMPLEX 20 MIN: CPT | Mod: GP | Performed by: PHYSICAL THERAPIST

## 2022-11-16 PROCEDURE — 97110 THERAPEUTIC EXERCISES: CPT | Mod: GP | Performed by: PHYSICAL THERAPIST

## 2022-11-16 NOTE — PROGRESS NOTES
Physical Therapy Initial Evaluation  Subjective:  The history is provided by the patient and medical records. No  was used.   Patient Health History  Tierney Pearson being seen for Left thigh hematoma.     Problem began: 9/28/2022.   Problem occurred: Fainted, wounded thigh   Pain is reported as 3/10 on pain scale.  General health as reported by patient is fair.  Pertinent medical history includes: concussions/dizziness, migraines/headaches, overweight and smoking. Other medical history details: neck pain, ear infections.     Medical allergies: other. Other medical allergies details: Polymixin.   Surgeries include:  Orthopedic surgery. Other surgery history details: carpal tunnel.    Current medications:  Pain medication. Other medications details: when needed.    Current occupation is .   Primary job tasks include:  Lifting/carrying, prolonged standing and pushing/pulling.                  Therapist Generated HPI Evaluation  Problem details: Was hospitalized after fainting for 3 days Sept 29, fell in bathroom. Reports has had some neck pain since then as well. Had L thigh hematoma, been working on some exercises and walking.   Still limited with standing, walking, activity by left thigh pain.   Using walking stick, hoping to limit use.   Exercises currently performing: ankle pumps, SLR, heel slides, hip roll.         Type of problem:  Left hip.    This is a new condition.  Condition occurred with:  A fall/slip.  Where condition occurred: at home.  Site of Pain: left thigh.  Pain is described as aching and sharp and is constant.    Since onset symptoms are gradually improving.  Associated symptoms:  Loss of motion/stiffness and loss of strength. Symptoms are exacerbated by activity  and relieved by ice and rest.      Restrictions due to condition include:  Currently not working due to present treatment.                          Objective:  System                                           Hip  Evaluation  HIP AROM:  : WNL, mild ER and IR restrictions hip mobility. no increased symptoms.                    Hip Strength:    Flexion:   Left: 4-/5    Pain: weak/painful  Right: 5-/5   Pain:                                Knee Extension:  Left: 4/5   +/-  Pain:Right: 5-/5    Pain:        Hip Special Testing:    Left hip positive for the following special tests:  Jose      Hip Palpation:  Palpations normal left hip: cont to have palpable left thigh.                 General     ROS    Assessment/Plan:    Patient is a 62 year old female with left side hip and left side knee complaints.    Patient has the following significant findings with corresponding treatment plan.                Diagnosis 1:  Left thigh pain  Pain -  home program  Decreased ROM/flexibility - manual therapy, therapeutic exercise and home program  Decreased strength - therapeutic exercise, therapeutic activities and home program  Impaired gait - gait training and home program  Impaired muscle performance - neuro re-education and home program    Therapy Evaluation Codes:   1) History comprised of:   Personal factors that impact the plan of care:      Time since onset of symptoms.    Comorbidity factors that impact the plan of care are:      Dizziness, Migraines/headaches, Overweight and Smoking.     Medications impacting care: Pain.  2) Examination of Body Systems comprised of:   Body structures and functions that impact the plan of care:      Hip and Knee.   Activity limitations that impact the plan of care are:      Bending, Driving, Dressing, Lifting, Sitting, Squatting/kneeling, Stairs, Standing, Walking and Working.  3) Clinical presentation characteristics are:   Stable/Uncomplicated.  4) Decision-Making    Low complexity using standardized patient assessment instrument and/or measureable assessment of functional outcome.  Cumulative Therapy Evaluation is: Low complexity.    Previous and current functional limitations:  (See Goal Flow  Sheet for this information)    Short term and Long term goals: (See Goal Flow Sheet for this information)     Communication ability:  Patient appears to be able to clearly communicate and understand verbal and written communication and follow directions correctly.  Treatment Explanation - The following has been discussed with the patient:   RX ordered/plan of care  Anticipated outcomes  Possible risks and side effects  This patient would benefit from PT intervention to resume normal activities.   Rehab potential is good.    Frequency:  2 X a month, once daily  Duration:  for 2 months  Discharge Plan:  Achieve all LTG.  Independent in home treatment program.  Reach maximal therapeutic benefit.    Please refer to the daily flowsheet for treatment today, total treatment time and time spent performing 1:1 timed codes.

## 2022-11-25 ENCOUNTER — THERAPY VISIT (OUTPATIENT)
Dept: PHYSICAL THERAPY | Facility: CLINIC | Age: 62
End: 2022-11-25
Payer: COMMERCIAL

## 2022-11-25 DIAGNOSIS — M79.652 PAIN OF LEFT THIGH: Primary | ICD-10-CM

## 2022-11-25 PROCEDURE — 97110 THERAPEUTIC EXERCISES: CPT | Mod: GP | Performed by: PHYSICAL THERAPIST

## 2022-11-25 PROCEDURE — 97112 NEUROMUSCULAR REEDUCATION: CPT | Mod: GP | Performed by: PHYSICAL THERAPIST

## 2022-11-28 ENCOUNTER — TELEPHONE (OUTPATIENT)
Dept: FAMILY MEDICINE | Facility: CLINIC | Age: 62
End: 2022-11-28

## 2022-11-28 NOTE — TELEPHONE ENCOUNTER
Patient wants to talk to Taylor regarding work schedule, scheduled for 11/30. Work release faxed. Pt notified.    Rachel Monreal, TELLO Dominguez

## 2022-11-28 NOTE — TELEPHONE ENCOUNTER
Received paperwork from patient to fax, with return to work today.  She wrote a note that she may talk with supervisor about reducing hours. Please let her know I filled out full duty return to work per her request, but would have her contact me if she would like me to write a note for reduced hours and for how long.  Taylor Weldon PA-C

## 2022-11-30 ENCOUNTER — OFFICE VISIT (OUTPATIENT)
Dept: FAMILY MEDICINE | Facility: CLINIC | Age: 62
End: 2022-11-30
Payer: COMMERCIAL

## 2022-11-30 VITALS
HEART RATE: 67 BPM | DIASTOLIC BLOOD PRESSURE: 80 MMHG | BODY MASS INDEX: 21.56 KG/M2 | TEMPERATURE: 98.4 F | SYSTOLIC BLOOD PRESSURE: 130 MMHG | RESPIRATION RATE: 15 BRPM | HEIGHT: 67 IN | WEIGHT: 137.4 LBS | OXYGEN SATURATION: 100 %

## 2022-11-30 DIAGNOSIS — S70.12XS HEMATOMA OF LEFT THIGH, SEQUELA: Primary | ICD-10-CM

## 2022-11-30 PROCEDURE — 99213 OFFICE O/P EST LOW 20 MIN: CPT | Performed by: PHYSICIAN ASSISTANT

## 2022-11-30 NOTE — PROGRESS NOTES
"  Assessment & Plan   ASSESSMENT/PLAN:      ICD-10-CM    1. Hematoma of left thigh, sequela  S70.12XS         Forms filled out - light duty restrictions specified. Will scan.      No follow-ups on file.    KEEGAN Hammond [facility] LARS Monet is a 62 year old, presenting for the following health issues:  RECHECK (Follow up on leg )      History of Present Illness       Reason for visit:  Clarify return    She eats 2-3 servings of fruits and vegetables daily.She consumes 1 sweetened beverage(s) daily.She exercises with enough effort to increase her heart rate 20 to 29 minutes per day.  She exercises with enough effort to increase her heart rate 5 days per week.   She is taking medications regularly.    *  Needing letter to return back to work     She went back to work 3 hours Monday (all standing) and was sore when she got home.  She would like to switch to   Leg is much better, hematoma is getting smaller. physical therapy is going well, she is finished with it now.      Review of Systems   Other than noted above, general, HEENT, respiratory, cardiac, MS, and gastrointestinal systems are negative.       Objective    BP (!) 166/101   Pulse 67   Temp 98.4  F (36.9  C) (Tympanic)   Resp 15   Ht 1.702 m (5' 7.01\")   Wt 62.3 kg (137 lb 6.4 oz)   SpO2 100%   BMI 21.51 kg/m    Body mass index is 21.51 kg/m .  Physical Exam   GENERAL: healthy, alert and no distress  RESP: lungs clear to auscultation - no rales, rhonchi or wheezes  CV: regular rate and rhythm, normal S1 S2, no S3 or S4, no murmur, click or rub, no peripheral edema and peripheral pulses strong  MS: no gross musculoskeletal defects noted, no edema. Better range of motion and movement of left leg            "

## 2022-12-01 NOTE — TELEPHONE ENCOUNTER
We filled out forms yesterday and gave patient the original but Dulce should still have them at our desk. Can fax those in to them.  Had written for return to work today  Sit breaks 5-10 minutes  Restrictions x1 month  We do not have a set date for next evaluation but can evaluate in 1 month  Taylor Weldon PA-C

## 2022-12-01 NOTE — TELEPHONE ENCOUNTER
Reason for Call:  Form, our goal is to have forms completed with 72 hours, however, some forms may require a visit or additional information.    Type of letter, form or note:  FMLA    Who is the form from?: Employer (if other please explain)    Where did the form come from: form was faxed in    What clinic location was the form placed at?: [facility]     Where the form was placed: Given to physician         Additional comments: Jessica (employer) called and has questions regarding FMLA paperwork.  Jessica claims she never received these forms, but only received information from patient.  Fax number was verified and correct.  Questions she has: Date able to RTW, define light duty tasks, how long are sitting breaks supposed to be, how long are these limitations are in place? And date of next evaluation?  Please fax these answers/orginal fax to Jessica again at [phone].  If any questions, please call Jessica [phone]    Call taken on 12/1/2022 at 11:25 AM by Jesenia Fonseca

## 2022-12-13 ENCOUNTER — OFFICE VISIT (OUTPATIENT)
Dept: CARDIOLOGY | Facility: CLINIC | Age: 62
End: 2022-12-13
Attending: PHYSICIAN ASSISTANT
Payer: COMMERCIAL

## 2022-12-13 VITALS
OXYGEN SATURATION: 100 % | DIASTOLIC BLOOD PRESSURE: 90 MMHG | SYSTOLIC BLOOD PRESSURE: 161 MMHG | WEIGHT: 138.8 LBS | HEART RATE: 74 BPM | BODY MASS INDEX: 21.73 KG/M2

## 2022-12-13 DIAGNOSIS — I47.29 PAROXYSMAL VENTRICULAR TACHYCARDIA (H): ICD-10-CM

## 2022-12-13 DIAGNOSIS — I47.10 PAROXYSMAL SUPRAVENTRICULAR TACHYCARDIA (H): ICD-10-CM

## 2022-12-13 DIAGNOSIS — I10 BENIGN ESSENTIAL HYPERTENSION: Primary | ICD-10-CM

## 2022-12-13 DIAGNOSIS — R55 VASOVAGAL SYNCOPE: ICD-10-CM

## 2022-12-13 DIAGNOSIS — R00.2 PALPITATIONS: ICD-10-CM

## 2022-12-13 PROCEDURE — 99203 OFFICE O/P NEW LOW 30 MIN: CPT | Performed by: INTERNAL MEDICINE

## 2022-12-13 NOTE — PROGRESS NOTES
Electrophysiology/ Clinic Note         H&P and Plan:     REASON FOR VISIT: Electrophysiology evaluation.      HISTORY OF PRESENT ILLNESS: Patient is a pleasant 62-year-old lady with history hypertension, who is here for evaluation of syncope.    Patient presents with an episode of syncope in October of this year.  At that time, she informs that she did not eat well throughout the day and she thought she was somewhat dehydrated.  She woke up in the middle of the night to go to the bathroom.  She had a bowel movement and admitted after bowel movement she became extremely lightheaded and passed out.  She traumatized her leg, which caused hematoma.  She was then evaluated in the ED.    As part of the work-up, she had an echocardiogram and a monitor which were unrevealing (details below).  She was then referred here for evaluation.    Today, she informs she is doing well.  She denies any symptoms of chest pain, palpitation, shortness of breath, lightheadedness or recurrence of syncope.    Blood pressure was elevated today.  Previously, she was take amlodipine, which was discontinued due to concern related to the recent syncope.    PREVIOUS STUDIES (personally reviewed):  -Echo (90/30/22): EF of 60 to 65%.  No significant valve disease.  -Ziopatch (10/11/2022): 1 run of nonsustained VT (6 beats) and 9 runs of P A. tach (up to 10 seconds)      ASSESSMENT AND PLAN:   1.  Syncope.  Work-up was essentially negative.  Most likely, syncopal episode was related to vasovagal.  However, we cannot completely rule out bradycardia arrhythmias.  Discussed possibly a loop recorder implantation, however she is not interested in pursuing any procedures at this time.  I recommend increase hydration and avoid skipping meals.  2.  Hypertension.  Blood pressure is elevated.  I recommend resuming amlodipine.  She will follow-up here with an TARUN in a couple weeks to reevaluate blood pressure.      Yaya Owen MD    Physical Exam:  Vitals:  BP (!) 161/90   Pulse 74   Wt 63 kg (138 lb 12.8 oz)   SpO2 100%   BMI 21.73 kg/m      Constitutional:  AAO x3.  Pt is in NAD.  HEAD: normocephalic.  SKIN: Skin normal color, texture and turgor with no lesions or eruptions.  Eyes: PERRL, EOMI.  ENT:  Supple, normal JVP. No lymphadenopathy or thyroid enlargement.  Chest:  CTAB.  Cardiac:  RRR, normal  S1 and S2.  No murmurs rubs or gallop.   Abdomen:  Normal BS.  Soft, non-tender and non-distended.  No rebound or guarding.    Extremities:  Pedious pulses palpable B/L.  No LE edema noticed.   Neurological: Strength and sensation grossly symmetric and intact throughout.       CURRENT MEDICATIONS:  Current Outpatient Medications   Medication Sig Dispense Refill     acetaminophen (TYLENOL) 325 MG tablet Take 2 tablets (650 mg) by mouth every 8 hours 50 tablet 0     Loratadine-Pseudoephedrine (CLARITIN-D 12 HOUR PO) Take 1 tablet by mouth daily as needed       amLODIPine (NORVASC) 2.5 MG tablet Take 1 tablet (2.5 mg) by mouth daily (Patient not taking: Reported on 12/13/2022) 90 tablet 0     aspirin 81 MG EC tablet Take 81 mg by mouth daily (Patient not taking: Reported on 12/13/2022)       nicotine (NICODERM CQ) 21 MG/24HR 24 hr patch Place 1 patch onto the skin daily as needed for smoking cessation (Patient not taking: Reported on 11/7/2022) 30 patch 0     thiamine 50 MG TABS Take 1 tablet (50 mg) by mouth daily (Patient not taking: Reported on 10/10/2022) 30 tablet 0       ALLERGIES     Allergies   Allergen Reactions     Ciprofloxacin      Rash and hives.     Flu Virus Vaccine Other (See Comments)     Broke out in hives     Seasonal Allergies      Polymyxin B Hives and Rash       PAST MEDICAL HISTORY:  Past Medical History:   Diagnosis Date     Hypertension        PAST SURGICAL HISTORY:  Past Surgical History:   Procedure Laterality Date     NO HISTORY OF SURGERY         FAMILY HISTORY:  The patient's family history was reviewed and is non-contributory for heart  disease.    SOCIAL HISTORY:  Social History     Socioeconomic History     Marital status: Single     Spouse name: None     Number of children: 0     Years of education: 14     Highest education level: None   Occupational History     Occupation: Behavioral Coordinator     Employer: [employer]   Tobacco Use     Smoking status: Former     Packs/day: 0.20     Years: 30.00     Pack years: 6.00     Types: Cigarettes     Quit date: 1999     Years since quittin.9     Smokeless tobacco: Never   Substance and Sexual Activity     Alcohol use: Yes     Comment: Occasional on the weekends - 12 pack     Drug use: No     Sexual activity: Yes     Partners: Male   Other Topics Concern     Parent/sibling w/ CABG, MI or angioplasty before 65F 55M? No       Review of Systems:  Skin:        Eyes:       ENT:       Respiratory:  Positive for dyspnea on exertion  Cardiovascular:    Positive for;palpitations;fatigue;lightheadedness;dizziness;syncope or near-syncope  Gastroenterology:      Genitourinary:       Musculoskeletal:       Neurologic:       Psychiatric:       Heme/Lymph/Imm:       Endocrine:           Recent Lab Results:  LIPID RESULTS:  Lab Results   Component Value Date    CHOL 188 10/26/2022    HDL 76 10/26/2022    LDL 95 10/26/2022    TRIG 85 10/26/2022       LIVER ENZYME RESULTS:  Lab Results   Component Value Date    AST 26 10/10/2022    AST 34 2011    ALT 14 10/10/2022    ALT 52 (H) 2011       CBC RESULTS:  Lab Results   Component Value Date    WBC 8.4 10/26/2022    WBC 6.9 2011    RBC 4.32 10/26/2022    RBC 4.35 2011    HGB 13.6 10/26/2022    HGB 13.0 2011    HCT 41.0 10/26/2022    HCT 38.2 2011    MCV 95 10/26/2022    MCV 88 2011    MCH 31.5 10/26/2022    MCH 29.9 2011    MCHC 33.2 10/26/2022    MCHC 34.0 2011    RDW 13.2 10/26/2022    RDW 12.9 2011     10/26/2022     2011       BMP RESULTS:  Lab Results   Component Value Date      (L) 10/26/2022     03/14/2011    POTASSIUM 4.0 10/26/2022    POTASSIUM 3.9 03/14/2011    CHLORIDE 99 10/26/2022    CHLORIDE 102 03/14/2011    CO2 25 10/26/2022    CO2 31 03/14/2011    ANIONGAP 11 10/26/2022    ANIONGAP 3 (L) 03/14/2011    GLC 91 10/26/2022    GLC 89 03/14/2011    BUN 5.3 (L) 10/26/2022    BUN 10 03/14/2011    CR 0.53 10/26/2022    CR 0.65 03/14/2011    GFRESTIMATED >90 10/26/2022    GFRESTIMATED >90 03/14/2011    GFRESTBLACK >90 03/14/2011    NAEEM 9.5 10/26/2022    NAEEM 8.8 03/14/2011        A1C RESULTS:  No results found for: A1C    INR RESULTS:  Lab Results   Component Value Date    INR 1.11 09/29/2022         ECHOCARDIOGRAM  No results found for this or any previous visit (from the past 8760 hour(s)).      No orders of the defined types were placed in this encounter.    No orders of the defined types were placed in this encounter.    There are no discontinued medications.      Encounter Diagnoses   Name Primary?     Palpitations      Vasovagal syncope      Paroxysmal ventricular tachycardia      Paroxysmal supraventricular tachycardia (H)          CC  Lauren Weldon PA-C  [id number]  [place] [id number]

## 2022-12-14 ENCOUNTER — TELEPHONE (OUTPATIENT)
Dept: FAMILY MEDICINE | Facility: CLINIC | Age: 62
End: 2022-12-14

## 2022-12-14 NOTE — LETTER
[facility]  [address] [facility] [address]  Phone: [phone]    December 14, 2022        Tierney Pearson  [address] [place] [address]          To whom it may concern:    RE: Tierney Pearson    Patient has been following with me since 10/10/22 after hospitalization for hematoma. She is recovered and is able to return to full duty work as a  without restrictions as of 12/14/22.    Please contact me for questions or concerns.      Sincerely,        Lauren Weldon PA-C

## 2022-12-14 NOTE — TELEPHONE ENCOUNTER
I spoke to patient. I wrote a letter, placed at CSS desk for faxing. I also per patient request put original and other forms (which were actually performance reviews and duty explanations from the new role, and not for me to fill out) at  for .  Taylor Weldon PA-C

## 2022-12-14 NOTE — TELEPHONE ENCOUNTER
Reason for call:    Symptom or request:     Patient called stating that she dropped off paperwork for completion--return to work--[employer].     Called to update fax # new employee health % janene [phone].    Scheduled telephone visit for Friday 12/16-- unsure if needed?      Best Time:  any    Can we leave a detailed message on this number?  YES     Amy KANG  Station      unknown

## 2022-12-14 NOTE — TELEPHONE ENCOUNTER
Forms received, placed with provider for completion. Updated fax # is [phone].    Rachel Monreal, TELLO Dominguez

## 2023-01-12 PROBLEM — M79.652 PAIN OF LEFT THIGH: Status: RESOLVED | Noted: 2022-11-16 | Resolved: 2023-01-12

## 2023-01-12 NOTE — PROGRESS NOTES
Discharge Note    Progress reporting period is from initial evaluation date (please see noted date below) to Nov 25, 2022.  Linked Episodes   Type: Episode: Status: Noted: Resolved: Last update: Updated by:   PHYSICAL THERAPY L leg 11/16/22 Active 11/16/2022 11/25/2022  7:20 AM Evette Mancilla PT      Comments:       Tierney failed to follow up and current status is unknown.  Please see information below for last relevant information on current status.  Patient seen for 2 visits.    SUBJECTIVE  Subjective changes noted by patient:  Notes symptoms are improving. Doing more walking but still somewhat limited iwth lengthy walking. Minimal use of cane.  .  Current pain level is  .     Previous pain level was  3/10.   Changes in function:  Yes (See Goal flowsheet attached for changes in current functional level)  Adverse reaction to treatment or activity: None    OBJECTIVE  Changes noted in objective findings:       ASSESSMENT/PLAN  Diagnosis: Left thigh pain   Updated problem list and treatment plan:   Decreased ROM/flexibility - HEP  Decreased strength - HEP  Impaired muscle performance - HEP  STG/LTGs have been met or progress has been made towards goals:  Yes, please see goal flowsheet for most current information  Assessment of Progress: current status is unknown.    Last current status: Pt is progressing as expected   Self Management Plans:  HEP  I have re-evaluated this patient and find that the nature, scope, duration and intensity of the therapy is appropriate for the medical condition of the patient.  Tierney continues to require the following intervention to meet STG and LTG's:  HEP.    Recommendations:  Discharge with current home program.  Patient to follow up with MD as needed.    Please refer to the daily flowsheet for treatment today, total treatment time and time spent performing 1:1 timed codes.

## 2024-07-22 ENCOUNTER — APPOINTMENT (OUTPATIENT)
Dept: CT IMAGING | Facility: HOSPITAL | Age: 64
End: 2024-07-22
Attending: EMERGENCY MEDICINE
Payer: COMMERCIAL

## 2024-07-22 ENCOUNTER — HOSPITAL ENCOUNTER (EMERGENCY)
Facility: HOSPITAL | Age: 64
Discharge: HOME | End: 2024-07-23
Attending: EMERGENCY MEDICINE | Admitting: EMERGENCY MEDICINE
Payer: COMMERCIAL

## 2024-07-22 DIAGNOSIS — F10.920 ALCOHOLIC INTOXICATION WITHOUT COMPLICATION (H): ICD-10-CM
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
## 2024-07-22 DIAGNOSIS — S01.01XA SCALP LACERATION, INITIAL ENCOUNTER: ICD-10-CM
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
## 2024-07-22 LAB
ALBUMIN SERPL BCG-MCNC: 4.8 G/DL (ref 3.5–5.2)
ALP SERPL-CCNC: 95 U/L (ref 40–150)
ALT SERPL W P-5'-P-CCNC: 14 U/L (ref 0–50)
ANION GAP SERPL CALCULATED.3IONS-SCNC: 17 MMOL/L (ref 7–15)
AST SERPL W P-5'-P-CCNC: 25 U/L (ref 0–45)
BILIRUB DIRECT SERPL-MCNC: <0.2 MG/DL (ref 0–0.3)
BILIRUB SERPL-MCNC: 0.5 MG/DL
BUN SERPL-MCNC: 5.4 MG/DL (ref 8–23)
CALCIUM SERPL-MCNC: 9 MG/DL (ref 8.8–10.4)
CHLORIDE SERPL-SCNC: 95 MMOL/L (ref 98–107)
CREAT SERPL-MCNC: 0.55 MG/DL (ref 0.51–0.95)
EGFRCR SERPLBLD CKD-EPI 2021: >90 ML/MIN/1.73M2
ERYTHROCYTE [DISTWIDTH] IN BLOOD BY AUTOMATED COUNT: 12.4 % (ref 10–15)
ETHANOL SERPL-MCNC: 0.2 G/DL
GLUCOSE SERPL-MCNC: 103 MG/DL (ref 70–99)
HCO3 SERPL-SCNC: 24 MMOL/L (ref 22–29)
HCT VFR BLD AUTO: 46.1 % (ref 35–47)
HGB BLD-MCNC: 16.2 G/DL (ref 11.7–15.7)
INR PPP: 1.02 (ref 0.85–1.15)
MCH RBC QN AUTO: 31.6 PG (ref 26.5–33)
MCHC RBC AUTO-ENTMCNC: 35.1 G/DL (ref 31.5–36.5)
MCV RBC AUTO: 90 FL (ref 78–100)
PLATELET # BLD AUTO: 412 10E3/UL (ref 150–450)
POTASSIUM SERPL-SCNC: 3.5 MMOL/L (ref 3.4–5.3)
PROT SERPL-MCNC: 8.5 G/DL (ref 6.4–8.3)
RBC # BLD AUTO: 5.12 10E6/UL (ref 3.8–5.2)
SODIUM SERPL-SCNC: 136 MMOL/L (ref 135–145)
WBC # BLD AUTO: 9.9 10E3/UL (ref 4–11)

## 2024-07-22 PROCEDURE — 85610 PROTHROMBIN TIME: CPT | Performed by: EMERGENCY MEDICINE

## 2024-07-22 PROCEDURE — 85027 COMPLETE CBC AUTOMATED: CPT | Performed by: EMERGENCY MEDICINE

## 2024-07-22 PROCEDURE — 250N000009 HC RX 250: Performed by: EMERGENCY MEDICINE

## 2024-07-22 PROCEDURE — 70450 CT HEAD/BRAIN W/O DYE: CPT

## 2024-07-22 PROCEDURE — 82077 ASSAY SPEC XCP UR&BREATH IA: CPT | Performed by: EMERGENCY MEDICINE

## 2024-07-22 PROCEDURE — 12005 RPR S/N/A/GEN/TRK12.6-20.0CM: CPT

## 2024-07-22 PROCEDURE — 96374 THER/PROPH/DIAG INJ IV PUSH: CPT | Mod: 59

## 2024-07-22 PROCEDURE — 80076 HEPATIC FUNCTION PANEL: CPT | Performed by: EMERGENCY MEDICINE

## 2024-07-22 PROCEDURE — 80048 BASIC METABOLIC PNL TOTAL CA: CPT | Performed by: EMERGENCY MEDICINE

## 2024-07-22 PROCEDURE — 36415 COLL VENOUS BLD VENIPUNCTURE: CPT | Performed by: EMERGENCY MEDICINE

## 2024-07-22 PROCEDURE — 250N000011 HC RX IP 250 OP 636: Mod: JZ | Performed by: EMERGENCY MEDICINE

## 2024-07-22 PROCEDURE — 72125 CT NECK SPINE W/O DYE: CPT

## 2024-07-22 PROCEDURE — 99285 EMERGENCY DEPT VISIT HI MDM: CPT | Mod: 25

## 2024-07-22 RX ORDER — MORPHINE SULFATE 4 MG/ML
4 INJECTION, SOLUTION INTRAMUSCULAR; INTRAVENOUS ONCE
Status: COMPLETED | OUTPATIENT
Start: 2024-07-22 | End: 2024-07-22

## 2024-07-22 RX ADMIN — Medication 3 ML: at 21:03

## 2024-07-22 RX ADMIN — MORPHINE SULFATE 4 MG: 4 INJECTION, SOLUTION INTRAMUSCULAR; INTRAVENOUS at 21:19

## 2024-07-22 RX ADMIN — Medication 3 ML: at 21:11

## 2024-07-22 ASSESSMENT — ACTIVITIES OF DAILY LIVING (ADL)
ADLS_ACUITY_SCORE: 38

## 2024-07-23 VITALS
BODY MASS INDEX: 20.4 KG/M2 | TEMPERATURE: 97.6 F | WEIGHT: 130 LBS | HEIGHT: 67 IN | OXYGEN SATURATION: 97 % | HEART RATE: 71 BPM | SYSTOLIC BLOOD PRESSURE: 191 MMHG | RESPIRATION RATE: 18 BRPM | DIASTOLIC BLOOD PRESSURE: 96 MMHG

## 2024-07-23 PROCEDURE — 250N000013 HC RX MED GY IP 250 OP 250 PS 637

## 2024-07-23 PROCEDURE — 250N000013 HC RX MED GY IP 250 OP 250 PS 637: Performed by: STUDENT IN AN ORGANIZED HEALTH CARE EDUCATION/TRAINING PROGRAM

## 2024-07-23 RX ORDER — ACETAMINOPHEN 325 MG/1
975 TABLET ORAL ONCE
Status: COMPLETED | OUTPATIENT
Start: 2024-07-23 | End: 2024-07-23

## 2024-07-23 RX ORDER — CEPHALEXIN 500 MG/1
500 CAPSULE ORAL 4 TIMES DAILY
Qty: 28 CAPSULE | Refills: 0 | Status: SHIPPED | OUTPATIENT
Start: 2024-07-23 | End: 2024-07-30

## 2024-07-23 RX ORDER — IBUPROFEN 600 MG/1
600 TABLET, FILM COATED ORAL ONCE
Status: COMPLETED | OUTPATIENT
Start: 2024-07-23 | End: 2024-07-23

## 2024-07-23 RX ORDER — OXYCODONE HYDROCHLORIDE 5 MG/1
5 TABLET ORAL ONCE
Status: COMPLETED | OUTPATIENT
Start: 2024-07-23 | End: 2024-07-23

## 2024-07-23 RX ORDER — ONDANSETRON 4 MG/1
4 TABLET, ORALLY DISINTEGRATING ORAL EVERY 8 HOURS PRN
Qty: 10 TABLET | Refills: 0 | Status: SHIPPED | OUTPATIENT
Start: 2024-07-23 | End: 2024-07-26

## 2024-07-23 RX ADMIN — ACETAMINOPHEN 975 MG: 325 TABLET ORAL at 02:52

## 2024-07-23 RX ADMIN — IBUPROFEN 600 MG: 600 TABLET, FILM COATED ORAL at 02:52

## 2024-07-23 RX ADMIN — OXYCODONE HYDROCHLORIDE 5 MG: 5 TABLET ORAL at 00:12

## 2024-07-23 ASSESSMENT — ACTIVITIES OF DAILY LIVING (ADL)
ADLS_ACUITY_SCORE: 38

## 2024-07-23 NOTE — DISCHARGE INSTRUCTIONS
Suture removal in 10 days    Use ibuprofen and Tylenol for pain at home.  Make sure you ice your head at least 3 times daily for at least 15 minutes.  You may do it longer/more frequent.    Please take 500mg of tylenol every 4 hours as well as 600mg of ibuprofen every 6 hours for pain. Do not take more than 4,000mg of tylenol in 24hrs.    Return for any worsening headaches, persistent vomiting, vision changes, one-sided weakness, difficulty walking, or any other concerning symptoms

## 2024-07-23 NOTE — ED PROVIDER NOTES
EMERGENCY DEPARTMENT ENCOUNTER      NAME: Tierney Pearson  AGE: 64 year old female  YOB: 1960  MRN: [id number]  EVALUATION DATE & TIME: 7/22/2024  8:50 PM    PCP: Lauren Weldon    ED PROVIDER: Zac Will M.D.      No chief complaint on file.        FINAL IMPRESSION:  Acute alcohol intoxication  Scalp laceration      ED COURSE & MEDICAL DECISION MAKING:    Pertinent Labs & Imaging studies reviewed. (See chart for details)  64 year old female presents to the Emergency Department for evaluation of general injuries after fall.  Patient brought in by her  and.  Per reports she was bending over and she lost her balance fell forward striking her head.  Large laceration noted to midline of scalp starting at the hairline extending posteriorly.  Moderate amount of bleeding.  Conjunctiva slightly injected.  Patient slurring words.  She denies any previous medical problems or any medications.  Does report alcohol use.  Patient appears to be moderately intoxicated.  CT imaging of the head and neck being performed.  Baseline blood work being performed.  Topical anesthetic and vasoconstrictive agent placed to laceration.. Patient appears non toxic with stable vitals signs.     8:47 PM I met with the patient for the initial interview and physical examination. Discussed plan for treatment and workup in the ED.    9:49 PM.  Blood alcohol moderately elevated 0.20.  INR normal 1.0.  CBC unremarkable.  Liver function test normal..  Awaiting CT imaging  11 PM.  CT imaging unremarkable.  No acute intracranial process nor cervical spine process on review of films.  Radiology confirms this.  Patient with large scalp laceration measuring perhaps 10 cm.  PA will repair the laceration.   is now present with the patient.  She will be ambulated after repair of the laceration.  Expectation is for discharge.  At the conclusion of the encounter I discussed the results of all of the tests and the disposition. The  questions were answered and return precautions provided. The patient or family acknowledged understanding and was agreeable with the care plan.       MEDICATIONS GIVEN IN THE EMERGENCY:  Medications - No data to display    NEW PRESCRIPTIONS STARTED AT TODAY'S ER VISIT  New Prescriptions    No medications on file          =================================================================    HPI    Patient information was obtained from: the patient    Use of Intrepreter: N/A         Tierney Pearson is a 64 year old female with a pertient medical history of tobacco use disorder and hypertension who presents to the ED for evaluation of a head laceration.     The patient was bending over to pick something up when she fell forward, hitting her head on the pavement. This occurred just prior to arrival at the emergency department. No loss of consciousness. The patient does not take blood thinners or any other daily medications. She endorses tobacco and alcohol use.       REVIEW OF SYSTEMS   Constitutional:  Denies fever, chills  Respiratory:  Denies productive cough or increased work of breathing  Cardiovascular:  Denies chest pain, palpitations  GI:  Denies abdominal pain, nausea, vomiting, or change in bowel or bladder habits   Musculoskeletal:  Denies any new muscle/joint swelling  Skin:  Denies rash. Positive for head laceration.   Neurologic:  Denies focal weakness. No syncope.   All systems negative except as marked.     PAST MEDICAL HISTORY:  Past Medical History:   Diagnosis Date    Hypertension        PAST SURGICAL HISTORY:  Past Surgical History:   Procedure Laterality Date    NO HISTORY OF SURGERY           CURRENT MEDICATIONS:    No current facility-administered medications for this encounter.    Current Outpatient Medications:     acetaminophen (TYLENOL) 325 MG tablet, Take 2 tablets (650 mg) by mouth every 8 hours, Disp: 50 tablet, Rfl: 0    amLODIPine (NORVASC) 2.5 MG tablet, Take 1 tablet (2.5 mg) by mouth  "daily (Patient not taking: Reported on 2022), Disp: 90 tablet, Rfl: 0    aspirin 81 MG EC tablet, Take 81 mg by mouth daily (Patient not taking: Reported on 2022), Disp: , Rfl:     Loratadine-Pseudoephedrine (CLARITIN-D 12 HOUR PO), Take 1 tablet by mouth daily as needed, Disp: , Rfl:     nicotine (NICODERM CQ) 21 MG/24HR 24 hr patch, Place 1 patch onto the skin daily as needed for smoking cessation (Patient not taking: Reported on 2022), Disp: 30 patch, Rfl: 0    thiamine 50 MG TABS, Take 1 tablet (50 mg) by mouth daily (Patient not taking: Reported on 10/10/2022), Disp: 30 tablet, Rfl: 0    ALLERGIES:  Allergies   Allergen Reactions    Ciprofloxacin      Rash and hives.    Influenza Virus Vaccine Other (See Comments)     Broke out in hives    Seasonal Allergies     Polymyxin B Hives and Rash       FAMILY HISTORY:  Family History   Problem Relation Age of Onset    Breast Cancer Mother     Heart Disease Father         issues    Breast Cancer Sister        SOCIAL HISTORY:   Social History     Socioeconomic History    Marital status: Single    Number of children: 0    Years of education: 14   Occupational History    Occupation: Behavioral Coordinator     Employer: [employer]   Tobacco Use    Smoking status: Former     Current packs/day: 0.00     Average packs/day: 0.2 packs/day for 30.0 years (6.0 ttl pk-yrs)     Types: Cigarettes     Start date: 1969     Quit date: 1999     Years since quittin.5    Smokeless tobacco: Never   Substance and Sexual Activity    Alcohol use: Yes     Comment: Occasional on the weekends - 12 pack    Drug use: No    Sexual activity: Yes     Partners: Male   Other Topics Concern    Parent/sibling w/ CABG, MI or angioplasty before 65F 55M? No       VITALS:  BP (!) 87/54   Pulse 54   Temp 97.6  F (36.4  C) (Oral)   Resp 10   Ht 1.702 m (5' 7\")   Wt 59 kg (130 lb)   SpO2 97%   BMI 20.36 kg/m        PHYSICAL EXAM    Constitutional:  Awake, alert, in mild " apparent distress. Slurring of words. Bloodshot eyes.   HENT:  Normocephalic, Atraumatic. Bilateral external ears normal. Oropharynx moist. Nose normal. Neck- Normal range of motion with no guarding, No midline cervical tenderness, Supple, No stridor.   Eyes:  PERRL, EOMI with no signs of entrapment, Conjunctiva normal, No discharge.   Respiratory:  Normal breath sounds, No respiratory distress, No wheezing.    Cardiovascular:  Normal heart rate, Normal rhythm, No appreciable rubs or gallops.   GI:  Soft, No tenderness, No distension, No palpable masses  Musculoskeletal:  Intact distal pulses, No edema. Good range of motion in all major joints. No tenderness to palpation or major deformities noted.  Integument:  Warm, Dry, No erythema, No rash. Approximately 10 cm deep laceration to central scalp starting just below hairline anteriorly with mild bleeding.   Neurologic:  Alert & oriented, Normal motor function, Normal sensory function, No focal deficits noted.   Psychiatric:  Affect normal, Judgment normal, Mood normal.     LAB:  All pertinent labs reviewed and interpreted.     Results for orders placed or performed during the hospital encounter of 07/22/24   Head CT w/o contrast     Status: None    Narrative    EXAM: CT HEAD W/O CONTRAST, CT CERVICAL SPINE W/O CONTRAST  LOCATION: [facility]  DATE: 7/22/2024    INDICATION: Intoxicated, fall  COMPARISON: None.  TECHNIQUE:   1) Routine CT Head without IV contrast. Multiplanar reformats. Dose reduction techniques were used.  2) Routine CT Cervical Spine without IV contrast. Multiplanar reformats. Dose reduction techniques were used.    FINDINGS:   HEAD CT:   INTRACRANIAL CONTENTS: No intracranial hemorrhage, extraaxial collection, or mass effect.  No CT evidence of acute infarct. Multiple old lacunar infarcts thalami and basal ganglia bilaterally. Ventricular system, basal cisterns and the cortical sulci are   consistent with diffuse with mild  volume loss for age.     VISUALIZED ORBITS/SINUSES/MASTOIDS: No intraorbital abnormality. Maxillary sinus. No middle ear or mastoid effusion.    BONES/SOFT TISSUES: No acute abnormality.    CERVICAL SPINE CT:   VERTEBRA: Normal vertebral body heights and alignment. No fracture or posttraumatic subluxation.     CANAL/FORAMINA: Degenerative disc disease primarily at the C5-4-5 to the C6-7 these levels have a moderate loss of disc space height, endplate changes along with small anterior and posterior osteophyte formations. There is mild diffuse facet arthropathy   visualized.    At the C4-C5 disc space level there is mild canal compromise and  left greater than right neural foraminal narrowing.    At the C5-6 disc space level there is broad there is mild canal compromise and moderate left neural foraminal narrowing.    At the C6-C7 disc space level there is mild canal compromise and mild left neuroforaminal narrowing Visualized lung fields are clear.      Impression    IMPRESSION:  HEAD CT:  1.  No CT finding of a mass lesion, hemorrhage or focal area suggestive of acute infarct.  2.  Scattered old lacunar infarcts basal ganglia and thalami bilaterally.    CERVICAL SPINE CT:  1.  No CT evidence for acute fracture or post traumatic subluxation.  2.  Degenerative disc disease with canal compromise and neural foraminal narrowing at the C4-5 and C6-7 disc space levels as described above.   CT Cervical Spine w/o Contrast     Status: None    Narrative    EXAM: CT HEAD W/O CONTRAST, CT CERVICAL SPINE W/O CONTRAST  LOCATION: [facility]  DATE: 7/22/2024    INDICATION: Intoxicated, fall  COMPARISON: None.  TECHNIQUE:   1) Routine CT Head without IV contrast. Multiplanar reformats. Dose reduction techniques were used.  2) Routine CT Cervical Spine without IV contrast. Multiplanar reformats. Dose reduction techniques were used.    FINDINGS:   HEAD CT:   INTRACRANIAL CONTENTS: No intracranial hemorrhage,  extraaxial collection, or mass effect.  No CT evidence of acute infarct. Multiple old lacunar infarcts thalami and basal ganglia bilaterally. Ventricular system, basal cisterns and the cortical sulci are   consistent with diffuse with mild volume loss for age.     VISUALIZED ORBITS/SINUSES/MASTOIDS: No intraorbital abnormality. Maxillary sinus. No middle ear or mastoid effusion.    BONES/SOFT TISSUES: No acute abnormality.    CERVICAL SPINE CT:   VERTEBRA: Normal vertebral body heights and alignment. No fracture or posttraumatic subluxation.     CANAL/FORAMINA: Degenerative disc disease primarily at the C5-4-5 to the C6-7 these levels have a moderate loss of disc space height, endplate changes along with small anterior and posterior osteophyte formations. There is mild diffuse facet arthropathy   visualized.    At the C4-C5 disc space level there is mild canal compromise and  left greater than right neural foraminal narrowing.    At the C5-6 disc space level there is broad there is mild canal compromise and moderate left neural foraminal narrowing.    At the C6-C7 disc space level there is mild canal compromise and mild left neuroforaminal narrowing Visualized lung fields are clear.      Impression    IMPRESSION:  HEAD CT:  1.  No CT finding of a mass lesion, hemorrhage or focal area suggestive of acute infarct.  2.  Scattered old lacunar infarcts basal ganglia and thalami bilaterally.    CERVICAL SPINE CT:  1.  No CT evidence for acute fracture or post traumatic subluxation.  2.  Degenerative disc disease with canal compromise and neural foraminal narrowing at the C4-5 and C6-7 disc space levels as described above.   INR     Status: Normal   Result Value Ref Range    INR 1.02 0.85 - 1.15   Hepatic function panel     Status: Abnormal   Result Value Ref Range    Protein Total 8.5 (H) 6.4 - 8.3 g/dL    Albumin 4.8 3.5 - 5.2 g/dL    Bilirubin Total 0.5 <=1.2 mg/dL    Alkaline Phosphatase 95 40 - 150 U/L    AST 25 0 -  45 U/L    ALT 14 0 - 50 U/L    Bilirubin Direct <0.20 0.00 - 0.30 mg/dL   CBC (+ platelets, no diff)     Status: Abnormal   Result Value Ref Range    WBC Count 9.9 4.0 - 11.0 10e3/uL    RBC Count 5.12 3.80 - 5.20 10e6/uL    Hemoglobin 16.2 (H) 11.7 - 15.7 g/dL    Hematocrit 46.1 35.0 - 47.0 %    MCV 90 78 - 100 fL    MCH 31.6 26.5 - 33.0 pg    MCHC 35.1 31.5 - 36.5 g/dL    RDW 12.4 10.0 - 15.0 %    Platelet Count 412 150 - 450 10e3/uL   Alcohol level blood     Status: Abnormal   Result Value Ref Range    Alcohol ethyl 0.20 (H) <=0.01 g/dL   Basic metabolic panel     Status: Abnormal   Result Value Ref Range    Sodium 136 135 - 145 mmol/L    Potassium 3.5 3.4 - 5.3 mmol/L    Chloride 95 (L) 98 - 107 mmol/L    Carbon Dioxide (CO2) 24 22 - 29 mmol/L    Anion Gap 17 (H) 7 - 15 mmol/L    Urea Nitrogen 5.4 (L) 8.0 - 23.0 mg/dL    Creatinine 0.55 0.51 - 0.95 mg/dL    GFR Estimate >90 >60 mL/min/1.73m2    Calcium 9.0 8.8 - 10.4 mg/dL    Glucose 103 (H) 70 - 99 mg/dL        RADIOLOGY:  Reviewed all pertinent imaging. Please see official radiology report.  No orders to display           I, Sarah Brasher, am serving as a scribe to document services personally performed by Zac Will MD, based on my observation and the provider's statements to me. I, Zac Will MD attest that Sarah Brasher is acting in a scribe capacity, has observed my performance of the services and has documented them in accordance with my direction.    Zac Will M.D.  Emergency Medicine  [facility] EMERGENCY DEPARTMENT     Zac Will MD  07/22/24 [id number]

## 2024-07-23 NOTE — ED TRIAGE NOTES
Pt arrives via car carried in by .  Pt was at home, reports bending over and fell forward and hit her head on a rock.  No LOC, no thinners.  Reports 4-6 beers today. Pt has large laceration to forehead, appears like a flap.      Triage Assessment (Adult)       Row Name 07/22/24 2055          Triage Assessment    Airway WDL WDL        Respiratory WDL    Respiratory WDL WDL        Peripheral/Neurovascular WDL    Peripheral Neurovascular WDL WDL        Cognitive/Neuro/Behavioral WDL    Cognitive/Neuro/Behavioral WDL WDL

## 2024-07-23 NOTE — ED NOTES
"EMERGENCY DEPARTMENT SIGN OUT NOTE        ED COURSE AND MEDICAL DECISION MAKING  Patient was signed out to me by Dr Zac Will at 1:08 AM    In brief, Tierney Pearson is a 64 year old female who initially presented with a fall and laceration      \"The patient was bending over to pick something up when she fell forward, hitting her head on the pavement. This occurred just prior to arrival at the emergency department. No loss of consciousness. The patient does not take blood thinners or any other daily medications. She endorses tobacco and alcohol use.\"    At time of sign out, disposition was pending road test     -Patient was observed here for almost 9 hours.  Sobered up.  Passed her p.o. challenge as well as her ambulatory challenge with nursing staff.  Patient sober  is here to pick her up and patient was brought home in stable condition.    FINAL IMPRESSION    1. Alcoholic intoxication without complication (H24)    2. Scalp laceration, initial encounter    3. Fall at home, initial encounter        ED MEDS  Medications   lido-EPINEPHrine-tetracaine (LET) topical gel GEL (3 mLs Topical $Given 7/22/24 2111)   lido-EPINEPHrine-tetracaine (LET) topical gel GEL (3 mLs Topical $Given 7/22/24 2103)   morphine (PF) injection 4 mg (4 mg Intravenous $Given 7/22/24 2119)   oxyCODONE (ROXICODONE) tablet 5 mg (5 mg Oral $Given 7/23/24 0012)       LAB  Labs Ordered and Resulted from Time of ED Arrival to Time of ED Departure   HEPATIC FUNCTION PANEL - Abnormal       Result Value    Protein Total 8.5 (*)     Albumin 4.8      Bilirubin Total 0.5      Alkaline Phosphatase 95      AST 25      ALT 14      Bilirubin Direct <0.20     CBC WITH PLATELETS - Abnormal    WBC Count 9.9      RBC Count 5.12      Hemoglobin 16.2 (*)     Hematocrit 46.1      MCV 90      MCH 31.6      MCHC 35.1      RDW 12.4      Platelet Count 412     ETHYL ALCOHOL LEVEL - Abnormal    Alcohol ethyl 0.20 (*)    BASIC METABOLIC PANEL - Abnormal    Sodium 136 "      Potassium 3.5      Chloride 95 (*)     Carbon Dioxide (CO2) 24      Anion Gap 17 (*)     Urea Nitrogen 5.4 (*)     Creatinine 0.55      GFR Estimate >90      Calcium 9.0      Glucose 103 (*)    INR - Normal    INR 1.02         EKG      RADIOLOGY    CT Cervical Spine w/o Contrast   Final Result   IMPRESSION:   HEAD CT:   1.  No CT finding of a mass lesion, hemorrhage or focal area suggestive of acute infarct.   2.  Scattered old lacunar infarcts basal ganglia and thalami bilaterally.      CERVICAL SPINE CT:   1.  No CT evidence for acute fracture or post traumatic subluxation.   2.  Degenerative disc disease with canal compromise and neural foraminal narrowing at the C4-5 and C6-7 disc space levels as described above.      Head CT w/o contrast   Final Result   IMPRESSION:   HEAD CT:   1.  No CT finding of a mass lesion, hemorrhage or focal area suggestive of acute infarct.   2.  Scattered old lacunar infarcts basal ganglia and thalami bilaterally.      CERVICAL SPINE CT:   1.  No CT evidence for acute fracture or post traumatic subluxation.   2.  Degenerative disc disease with canal compromise and neural foraminal narrowing at the C4-5 and C6-7 disc space levels as described above.          DISCHARGE MEDS  New Prescriptions    CEPHALEXIN (KEFLEX) 500 MG CAPSULE    Take 1 capsule (500 mg) by mouth 4 times daily for 7 days       Benjamin Grimes DO  [facility] EMERGENCY DEPARTMENT  [facility] [facility] [address] [phone]         Benjamin Grimes DO  07/23/24 [id number]

## 2024-07-23 NOTE — ED PROVIDER NOTES
PROCEDURE: Laceration Repair   INDICATIONS: Laceration   PROCEDURE PROVIDER: Coretta Arechiga PA-C   SITE: Scalp and forehead   TYPE/SIZE: complex, subcutaneous, contaminated, and ragged edges  16 cm (total length)   FUNCTIONAL ASSESSMENT: Distal sensation, circulation, and motor intact   MEDICATION: 3 mLs of 1% Lidocaine with epinephrine   PREPARATION: irrigation with Normal saline   DEBRIDEMENT: wound explored and foreign body/bodies removed   CLOSURE:  Superficial layer closed with 12 stitches of 5-0 Prolene simple interrupted and 22 staples    Total number of sutures/staples placed: 22 staples, 12 sutures     11:54 PM Met with patient and laceration repair performed. Patient discharged following procedure completion.   12:55 AM Laceration repair completed. Patient signed out to Dr. Grimes pending ambulation trial as patient intoxicated and did receive narcotic analgesia.    Coretta Arechiga PA-C  [facility] Emergency Department  07/22/2024        Coretta Arechiga PA-C  07/23/24 0100

## 2024-08-01 ENCOUNTER — OFFICE VISIT (OUTPATIENT)
Dept: FAMILY MEDICINE | Facility: CLINIC | Age: 64
End: 2024-08-01
Payer: COMMERCIAL

## 2024-08-01 VITALS
BODY MASS INDEX: 23.07 KG/M2 | OXYGEN SATURATION: 97 % | SYSTOLIC BLOOD PRESSURE: 136 MMHG | TEMPERATURE: 98 F | HEIGHT: 67 IN | DIASTOLIC BLOOD PRESSURE: 80 MMHG | WEIGHT: 147 LBS | HEART RATE: 73 BPM

## 2024-08-01 DIAGNOSIS — I47.10 PAROXYSMAL SUPRAVENTRICULAR TACHYCARDIA (H): ICD-10-CM

## 2024-08-01 DIAGNOSIS — Z48.02 ENCOUNTER FOR STAPLE REMOVAL: ICD-10-CM

## 2024-08-01 DIAGNOSIS — Z48.02 ENCOUNTER FOR REMOVAL OF SUTURES: ICD-10-CM

## 2024-08-01 DIAGNOSIS — S09.90XD CLOSED HEAD INJURY, SUBSEQUENT ENCOUNTER: Primary | ICD-10-CM

## 2024-08-01 DIAGNOSIS — I47.29 PAROXYSMAL VENTRICULAR TACHYCARDIA (H): ICD-10-CM

## 2024-08-01 PROCEDURE — G2211 COMPLEX E/M VISIT ADD ON: HCPCS | Performed by: PHYSICIAN ASSISTANT

## 2024-08-01 PROCEDURE — 99215 OFFICE O/P EST HI 40 MIN: CPT | Performed by: PHYSICIAN ASSISTANT
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
## 2024-08-01 NOTE — PATIENT INSTRUCTIONS
Return to work: 8/13/24 with light duty (restrictions + 4 hour days)  Return to full duty: 9/17/24

## 2024-08-02 PROBLEM — I47.20 PAROXYSMAL VENTRICULAR TACHYCARDIA (H): Status: ACTIVE | Noted: 2024-08-02

## 2024-08-02 PROBLEM — I47.29 PAROXYSMAL VENTRICULAR TACHYCARDIA (H): Status: ACTIVE | Noted: 2024-08-02

## 2024-08-02 PROBLEM — I47.10 PAROXYSMAL SUPRAVENTRICULAR TACHYCARDIA (H): Status: ACTIVE | Noted: 2024-08-02

## 2025-03-15 ENCOUNTER — HOSPITAL ENCOUNTER (INPATIENT)
Facility: HOSPITAL | Age: 65
LOS: 1 days | Discharge: HOME | End: 2025-03-17
Attending: EMERGENCY MEDICINE | Admitting: HOSPITALIST
Payer: COMMERCIAL

## 2025-03-15 ENCOUNTER — APPOINTMENT (OUTPATIENT)
Dept: MRI IMAGING | Facility: HOSPITAL | Age: 65
End: 2025-03-15
Attending: EMERGENCY MEDICINE
Payer: COMMERCIAL

## 2025-03-15 ENCOUNTER — APPOINTMENT (OUTPATIENT)
Dept: CT IMAGING | Facility: HOSPITAL | Age: 65
End: 2025-03-15
Attending: EMERGENCY MEDICINE
Payer: COMMERCIAL

## 2025-03-15 DIAGNOSIS — I63.9 ACUTE ISCHEMIC STROKE (H): ICD-10-CM

## 2025-03-15 DIAGNOSIS — R53.1 LEFT-SIDED WEAKNESS: ICD-10-CM

## 2025-03-15 DIAGNOSIS — R42 DIZZINESS: ICD-10-CM

## 2025-03-15 LAB
ANION GAP SERPL CALCULATED.3IONS-SCNC: 7 MMOL/L (ref 7–15)
BASOPHILS # BLD AUTO: 0 10E3/UL (ref 0–0.2)
BASOPHILS NFR BLD AUTO: 0 %
BUN SERPL-MCNC: 7 MG/DL (ref 8–23)
CALCIUM SERPL-MCNC: 9.5 MG/DL (ref 8.8–10.4)
CHLORIDE SERPL-SCNC: 101 MMOL/L (ref 98–107)
CHOLEST SERPL-MCNC: 155 MG/DL
CREAT SERPL-MCNC: 0.54 MG/DL (ref 0.51–0.95)
EGFRCR SERPLBLD CKD-EPI 2021: >90 ML/MIN/1.73M2
EOSINOPHIL # BLD AUTO: 0.3 10E3/UL (ref 0–0.7)
EOSINOPHIL NFR BLD AUTO: 5 %
ERYTHROCYTE [DISTWIDTH] IN BLOOD BY AUTOMATED COUNT: 12.8 % (ref 10–15)
EST. AVERAGE GLUCOSE BLD GHB EST-MCNC: 91 MG/DL
GLUCOSE SERPL-MCNC: 85 MG/DL (ref 70–99)
HBA1C MFR BLD: 4.8 %
HCO3 SERPL-SCNC: 27 MMOL/L (ref 22–29)
HCT VFR BLD AUTO: 45.4 % (ref 35–47)
HDLC SERPL-MCNC: 56 MG/DL
HGB BLD-MCNC: 15.4 G/DL (ref 11.7–15.7)
IMM GRANULOCYTES # BLD: 0 10E3/UL
IMM GRANULOCYTES NFR BLD: 0 %
LDLC SERPL CALC-MCNC: 86 MG/DL
LYMPHOCYTES # BLD AUTO: 1.1 10E3/UL (ref 0.8–5.3)
LYMPHOCYTES NFR BLD AUTO: 19 %
MCH RBC QN AUTO: 30.5 PG (ref 26.5–33)
MCHC RBC AUTO-ENTMCNC: 33.9 G/DL (ref 31.5–36.5)
MCV RBC AUTO: 90 FL (ref 78–100)
MONOCYTES # BLD AUTO: 0.3 10E3/UL (ref 0–1.3)
MONOCYTES NFR BLD AUTO: 6 %
NEUTROPHILS # BLD AUTO: 4.2 10E3/UL (ref 1.6–8.3)
NEUTROPHILS NFR BLD AUTO: 70 %
NONHDLC SERPL-MCNC: 99 MG/DL
NRBC # BLD AUTO: 0 10E3/UL
NRBC BLD AUTO-RTO: 0 /100
PLATELET # BLD AUTO: 369 10E3/UL (ref 150–450)
POTASSIUM SERPL-SCNC: 4 MMOL/L (ref 3.4–5.3)
RBC # BLD AUTO: 5.05 10E6/UL (ref 3.8–5.2)
SODIUM SERPL-SCNC: 135 MMOL/L (ref 135–145)
TRIGL SERPL-MCNC: 66 MG/DL
TROPONIN T SERPL HS-MCNC: 7 NG/L
TROPONIN T SERPL HS-MCNC: 8 NG/L
WBC # BLD AUTO: 6 10E3/UL (ref 4–11)

## 2025-03-15 PROCEDURE — 255N000002 HC RX 255 OP 636: Performed by: EMERGENCY MEDICINE

## 2025-03-15 PROCEDURE — 250N000013 HC RX MED GY IP 250 OP 250 PS 637: Performed by: EMERGENCY MEDICINE

## 2025-03-15 PROCEDURE — G0378 HOSPITAL OBSERVATION PER HR: HCPCS

## 2025-03-15 PROCEDURE — 99207 PR APP CREDIT; MD BILLING SHARED VISIT: CPT | Mod: FS

## 2025-03-15 PROCEDURE — 250N000011 HC RX IP 250 OP 636: Performed by: EMERGENCY MEDICINE

## 2025-03-15 PROCEDURE — 70553 MRI BRAIN STEM W/O & W/DYE: CPT

## 2025-03-15 PROCEDURE — 36415 COLL VENOUS BLD VENIPUNCTURE: CPT | Performed by: EMERGENCY MEDICINE

## 2025-03-15 PROCEDURE — 93005 ELECTROCARDIOGRAM TRACING: CPT | Performed by: STUDENT IN AN ORGANIZED HEALTH CARE EDUCATION/TRAINING PROGRAM

## 2025-03-15 PROCEDURE — A9585 GADOBUTROL INJECTION: HCPCS | Performed by: EMERGENCY MEDICINE

## 2025-03-15 PROCEDURE — 80048 BASIC METABOLIC PNL TOTAL CA: CPT | Performed by: EMERGENCY MEDICINE

## 2025-03-15 PROCEDURE — 99207 PR NO CHARGE LOS: CPT | Performed by: NURSE PRACTITIONER

## 2025-03-15 PROCEDURE — 99223 1ST HOSP IP/OBS HIGH 75: CPT | Performed by: STUDENT IN AN ORGANIZED HEALTH CARE EDUCATION/TRAINING PROGRAM

## 2025-03-15 PROCEDURE — 96374 THER/PROPH/DIAG INJ IV PUSH: CPT

## 2025-03-15 PROCEDURE — 99285 EMERGENCY DEPT VISIT HI MDM: CPT | Mod: 25

## 2025-03-15 PROCEDURE — 85014 HEMATOCRIT: CPT | Performed by: EMERGENCY MEDICINE

## 2025-03-15 PROCEDURE — 84484 ASSAY OF TROPONIN QUANT: CPT | Performed by: EMERGENCY MEDICINE

## 2025-03-15 PROCEDURE — 85025 COMPLETE CBC W/AUTO DIFF WBC: CPT | Performed by: EMERGENCY MEDICINE

## 2025-03-15 PROCEDURE — 85048 AUTOMATED LEUKOCYTE COUNT: CPT | Performed by: EMERGENCY MEDICINE

## 2025-03-15 PROCEDURE — 80061 LIPID PANEL: CPT | Performed by: STUDENT IN AN ORGANIZED HEALTH CARE EDUCATION/TRAINING PROGRAM

## 2025-03-15 PROCEDURE — 70496 CT ANGIOGRAPHY HEAD: CPT

## 2025-03-15 PROCEDURE — 83036 HEMOGLOBIN GLYCOSYLATED A1C: CPT | Performed by: STUDENT IN AN ORGANIZED HEALTH CARE EDUCATION/TRAINING PROGRAM

## 2025-03-15 RX ORDER — GADOBUTROL 604.72 MG/ML
7 INJECTION INTRAVENOUS ONCE
Status: COMPLETED | OUTPATIENT
Start: 2025-03-15 | End: 2025-03-15

## 2025-03-15 RX ORDER — LORATADINE 10 MG/1
10 TABLET ORAL EVERY OTHER DAY
COMMUNITY

## 2025-03-15 RX ORDER — ONDANSETRON 4 MG/1
4 TABLET, ORALLY DISINTEGRATING ORAL EVERY 6 HOURS PRN
Status: DISCONTINUED | OUTPATIENT
Start: 2025-03-15 | End: 2025-03-17 | Stop reason: HOSPADM

## 2025-03-15 RX ORDER — ASPIRIN 325 MG
325 TABLET ORAL ONCE
Status: COMPLETED | OUTPATIENT
Start: 2025-03-15 | End: 2025-03-15

## 2025-03-15 RX ORDER — ACETAMINOPHEN 325 MG/1
975 TABLET ORAL 3 TIMES DAILY PRN
Status: DISCONTINUED | OUTPATIENT
Start: 2025-03-15 | End: 2025-03-17 | Stop reason: HOSPADM

## 2025-03-15 RX ORDER — PROCHLORPERAZINE MALEATE 10 MG
10 TABLET ORAL EVERY 6 HOURS PRN
Status: DISCONTINUED | OUTPATIENT
Start: 2025-03-15 | End: 2025-03-17 | Stop reason: HOSPADM

## 2025-03-15 RX ORDER — LIDOCAINE 40 MG/G
CREAM TOPICAL
Status: DISCONTINUED | OUTPATIENT
Start: 2025-03-15 | End: 2025-03-17 | Stop reason: HOSPADM

## 2025-03-15 RX ORDER — HYDRALAZINE HYDROCHLORIDE 20 MG/ML
10 INJECTION INTRAMUSCULAR; INTRAVENOUS EVERY 6 HOURS PRN
Status: DISCONTINUED | OUTPATIENT
Start: 2025-03-15 | End: 2025-03-17 | Stop reason: HOSPADM

## 2025-03-15 RX ORDER — LORAZEPAM 2 MG/ML
1 INJECTION INTRAMUSCULAR ONCE
Status: COMPLETED | OUTPATIENT
Start: 2025-03-15 | End: 2025-03-15

## 2025-03-15 RX ORDER — ONDANSETRON 2 MG/ML
4 INJECTION INTRAMUSCULAR; INTRAVENOUS EVERY 6 HOURS PRN
Status: DISCONTINUED | OUTPATIENT
Start: 2025-03-15 | End: 2025-03-17 | Stop reason: HOSPADM

## 2025-03-15 RX ORDER — LORATADINE 10 MG/1
10 TABLET ORAL EVERY OTHER DAY
Status: DISCONTINUED | OUTPATIENT
Start: 2025-03-16 | End: 2025-03-17 | Stop reason: HOSPADM

## 2025-03-15 RX ORDER — ASPIRIN 325 MG
325 TABLET ORAL DAILY
Status: DISCONTINUED | OUTPATIENT
Start: 2025-03-16 | End: 2025-03-17 | Stop reason: HOSPADM

## 2025-03-15 RX ORDER — IOPAMIDOL 755 MG/ML
67 INJECTION, SOLUTION INTRAVASCULAR ONCE
Status: COMPLETED | OUTPATIENT
Start: 2025-03-15 | End: 2025-03-15

## 2025-03-15 RX ADMIN — GADOBUTROL 7 ML: 604.72 INJECTION INTRAVENOUS at 14:46

## 2025-03-15 RX ADMIN — IOPAMIDOL 67 ML: 755 INJECTION, SOLUTION INTRAVENOUS at 12:27

## 2025-03-15 RX ADMIN — LORAZEPAM 1 MG: 2 INJECTION INTRAMUSCULAR; INTRAVENOUS at 14:23

## 2025-03-15 RX ADMIN — ASPIRIN 325 MG ORAL TABLET 325 MG: 325 PILL ORAL at 17:29

## 2025-03-15 ASSESSMENT — ACTIVITIES OF DAILY LIVING (ADL)
ADLS_ACUITY_SCORE: 53
DEPENDENT_IADLS:: INDEPENDENT
ADLS_ACUITY_SCORE: 53
ADLS_ACUITY_SCORE: 30
ADLS_ACUITY_SCORE: 53

## 2025-03-15 ASSESSMENT — COLUMBIA-SUICIDE SEVERITY RATING SCALE - C-SSRS
6. HAVE YOU EVER DONE ANYTHING, STARTED TO DO ANYTHING, OR PREPARED TO DO ANYTHING TO END YOUR LIFE?: NO
2. HAVE YOU ACTUALLY HAD ANY THOUGHTS OF KILLING YOURSELF IN THE PAST MONTH?: NO
1. IN THE PAST MONTH, HAVE YOU WISHED YOU WERE DEAD OR WISHED YOU COULD GO TO SLEEP AND NOT WAKE UP?: NO

## 2025-03-15 NOTE — H&P
[facility]    History and Physical - Hospitalist Service       Date of Admission:  3/15/2025    Assessment & Plan    Tierney Pearson is a 64 year old female admitted on 3/15/2025. She presented to the ER for evaluation of dizziness and left hand weakness x 4 days.  Brain MRI showing multiple focal hyperacute-acute infarcts.     CVA with Multifocal Infarcts  Abnormal Head CTA  Presented to the ER for evaluation of 4 days of left-sided weakness and dizziness  -- MRI brain with multiple foci of diffusion restriction within the cerebral hemispheres and right david-aleksander are favored to represent late hyperacute to acute infarctions.  Advanced small vessel ischemic disease  -- Noted head CTA with 2.5 mm laterally directed outpouching arising from the distal right basilar artery could represent a small aneurysm or residual opacification of a stenotic or partially occluded right P1 segment. An aneurysm is favored   -- Neck CTA mild carotid artery atherosclerotic disease, moderate atherosclerotic stenosis of right vertebral artery origin  -- Stroke neuro consulted in the ER ; appreciate recommendation  -- Inpatient neurology consult placed  -- Neurochecks and Vital Signs every 4 hours   -- Daily  mg  -- BP goal <180 with gradual reduction to normotension  -- Statin: pending LDL  -- Echo ordered  -- Telemonitoring  -- A1c 4.8% , lipid panel within normal limits, trop flat   -- PT/OT/SLP    Checking CT chest/A/P per stroke neurology recommendations given involvement of 3 vascular territories to assess for occult malignancy     Hypertensive urgency   in the ER  -- Per stroke neuro, no need for permissive HTN , goal SBP < 180  -- As needed hydralazine  -- Monitor, no PTA meds    Incidental findings on imaging - advanced dental disease. Outpatient follow-up         Diet: NPO for Medical/Clinical Reasons Except for: Meds  Regular Diet Adult    DVT Prophylaxis: Pneumatic Compression Devices  Puneet  "Catheter: Not present  Lines: None     Cardiac Monitoring: ACTIVE order. Indication: Stroke, acute (48 hours)  Code Status: No CPR- Do NOT Intubate -- had a in-depth and lengthy discussion with patient at bedside, she affirms she would not want resuscitation efforts and would like to be DNR/DNI    Clinically Significant Risk Factors Present on Admission                   # Hypertension: Noted on problem list               # Financial/Environmental Concerns: none         Disposition Plan     Medically Ready for Discharge: Anticipated Tomorrow        The patient's care was discussed with the Attending Physician, Dr. Robby Rowan who independently met with and assessed the patient and is in agreement with the assessment and plan     Clementine Yu PA-C  Hospitalist [facility]  Securely message with [employer] (more info)  Text page via [facility] Paging/Directory     ______________________________________________________________________    Chief Complaint   Dizziness  Left-sided weakness    History is obtained from the patient    History of Present Illness   Tierney Pearson is a 64 year old female who presented to the ER on 3/15/2025 for evaluation of 4 days of dizziness and left-sided weakness.  Patient states symptoms initially started when she was moving something, felt like she twisted her back and developed left-sided back tightness that progressed to her left side with associated left and leg arm weakness.  Endorses difficulty with writing (patient is left-handed) and associated \"kaleidoscope\" vision in her left eye.  She also endorses feeling like she has noticed changes in her speech over the past several days.      Updated significant other who was in the room with the patient and answered questions including plans for this hospitalization.     Past Medical History    Past Medical History:   Diagnosis Date    Hypertension        Past Surgical History   Past Surgical History: "   Procedure Laterality Date    NO HISTORY OF SURGERY         Prior to Admission Medications   Prior to Admission Medications   Prescriptions Last Dose Informant Patient Reported? Taking?   loratadine (CLARITIN) 10 MG tablet 3/14/2025 Morning  Yes Yes   Sig: Take 10 mg by mouth every other day.      Facility-Administered Medications: None           Physical Exam   Vital Signs: Temp: 98.4  F (36.9  C) Temp src: Oral BP: (!) 145/77 (nurse notified) Pulse: 69   Resp: 20 SpO2: 95 % O2 Device: None (Room air)    Weight: 150 lbs 5.66 oz    Constitutional: awake, alert, no apparent distress, and appears stated age  Eyes: Lids and lashes normal, extra ocular muscles intact, sclera clear, conjunctiva normal  Respiratory: No increased work of breathing, no accessory muscle use, clear to auscultation bilaterally, no crackles or wheezing  Cardiovascular: Regular rate and rhythm, normal S1 and S2  Skin: Normal skin color, texture, turgor. No rashes and no jaundice  Musculoskeletal: no lower extremity pitting edema present.   Neurologic: Awake, alert.  Subtle, slight left-sided facial droop.  Slightly slurred speech.  Noted mild dysmetria to left upper extremity. 5 out of 5 strength bilateral upper and lower extremity  Neuropsychiatric: Appropriate mood, affect and eye contact. Cooperative.    Medical Decision Making             Data     I have personally reviewed the following data over the past 24 hrs:    6.0  \   15.4   / 369     135 101 7.0 (L) /  85   4.0 27 0.54 \     Trop: 8 BNP: N/A     TSH: N/A T4: N/A A1C: 4.8       Imaging results reviewed over the past 24 hrs:   Recent Results (from the past 24 hours)   CTA Head Neck with Contrast    Narrative    HEAD AND NECK CT ANGIOGRAM WITH IV CONTRAST  [facility]  03/15/2025, 12:27 PM CDT    INDICATION: Left-sided weakness, dizziness.  TECHNIQUE: Head and neck CT angiogram with IV contrast. Noncontrast head CT followed by axial helical CT images of the  head and neck vessels obtained during the arterial phase of intravenous contrast administration. Axial helical 2D reconstructed images   and multiplanar 3D MIP reconstructed images of the head and neck vessels were performed by the technologist. Dose reduction techniques were used. Vessel stenoses reported according to NASCET criteria.  CONTRAST: 67 mL Isovue 370.  COMPARISON: Head CT 07/22/2024.    FINDINGS:  NONCONTRAST HEAD CT: No hemorrhage. There are numerous lacunar infarcts within the bilateral basal ganglia that appear to have been largely present previously. The ventricles and sulci are normal for age. Osseous structures are intact. The visualized   paranasal sinuses are free of significant disease. The mastoid air cells are free of significant disease. The orbits are unremarkable. Advanced dental disease.    HEAD CTA: Mild intracranial atherosclerosis predominantly affecting the carotid siphons and right vertebral artery. No large vessel occlusion. 2.5 mm laterally directed outpouching arising from the distal right basilar artery just above the superior   cerebellar artery origin could represent a small aneurysm or residual opacification of a stenotic or partially occluded right P1 segment. An aneurysm is favored. A right posterior communicating artery is the predominant supply to the right posterior   cerebral artery. The dural venous sinuses are not well visualized due to the arterial timing of the contrast bolus.    NECK CTA: Left vertebral artery arises from the aortic arch.  Carotid arteries are patent with mild atherosclerotic change.  No hemodynamically significant stenosis by NASCET criteria in either carotid system.  Moderate atherosclerotic stenosis of the   dominant right vertebral artery origin.      Impression    CONCLUSION:  HEAD CT:  1.  No hemorrhage.  2.  There are numerous lacunar infarcts within the bilateral basal ganglia that appear to have been largely present previously. MRI would be  more sensitive for recent infarct in this patient.  3.  Advanced dental disease.    HEAD CTA:  1.  No large vessel occlusion.  2.  Mild intracranial atherosclerosis.  3.  2.5 mm laterally directed outpouching arising from the distal right basilar artery could represent a small aneurysm or residual opacification of a stenotic or partially occluded right P1 segment. An aneurysm is favored.    NECK CTA:  1.  Mild carotid artery atherosclerotic disease. No hemodynamically significant narrowing in either carotid artery by NASCET criteria.  2.  Moderate atherosclerotic stenosis of the dominant right vertebral artery origin.     MR Brain w/o & w Contrast    Narrative    EXAM: MR BRAIN W/O and W CONTRAST  LOCATION: [facility]  DATE: 3/15/2025    INDICATION: Stroke like symptoms  COMPARISON: Same-day CT/CTA, 07/22/2024  CONTRAST: 7ml Gadavist  TECHNIQUE: Routine multiplanar multisequence head MRI without and with intravenous contrast.    FINDINGS:  INTRACRANIAL CONTENTS:     Multiple small foci of diffusion restriction within the subcortical white matter of the left frontal lobe, the subcortical white matter of the left temporal lobe, the medial aspect of the right temporal lobe and within the ventral right david-aleksander all of   which display ADC hypointense and T2/FLAIR hyperintense signal, suggestive of late hyperacute to acute infarctions. There is no mass effect or midline shift.    No evidence of hemorrhagic conversion on the susceptibility weighted sequence. There is a mild degree of cerebral parenchymal volume loss with somewhat extensive punctate and confluent T2 hyperintense signal throughout the cerebral white matter.    Normal position of the cerebellar tonsils. No pathologic contrast enhancement.    SELLA: No abnormality accounting for technique.    OSSEOUS STRUCTURES/SOFT TISSUES: Normal marrow signal.     ORBITS: No abnormality accounting for technique.     SINUSES/MASTOIDS: No paranasal  sinus mucosal disease. Scattered fluid/membrane thickening in the left mastoid air cells. No apparent mass in the posterior nasopharynx or skull base.       Impression    IMPRESSION:    1.  Multiple foci of diffusion restriction within the cerebral hemispheres and right david-aleksander are favored to represent late hyperacute to acute infarctions.  2.  Advanced small vessel ischemic disease within the cerebral white matter.    These findings were communicated over the phone to Dr. Agee at 3:30 PM on 03/15/2025 by Erick Amaya.

## 2025-03-15 NOTE — LETTER
[facility]  [facility] [facility] [address]  Phone: [phone]  Fax: [phone]    March 17, 2025        Tierney Pearson  [address] [place] [address]          To Whom It May Concern:    Tierney Pearson was admitted to our facility from 3/15/2025 to 3/17/2025. Please excuse her from work.    She may return to work without restriction on 3/24/2025 . If you have questions or concerns about this message please contact Ms. Pearson directly for more information.    Sincerely,    Blanche Suarez MD

## 2025-03-15 NOTE — MEDICATION SCRIBE - ADMISSION MEDICATION HISTORY
Medication Scribe Admission Medication History    Admission medication history is complete. The information provided in this note is only as accurate as the sources available at the time of the update.    Information Source(s): Patient and CareEverywhere/SureScripts via in-person    Pertinent Information: Patient reports no current prescription medications. Only takes Claritin at home every other day.    Changes made to PTA medication list:  Added:  Clatitin 10 mg every other day (added per pt)  Deleted:   Tylenol 325 mg PRN  Amlodipine 2.5 mg  Aspirin 81 mg   Claritin D  Nicotin patch  Thiamine 50 mg  Changed: None    Allergies reviewed with patient and updates made in EHR: yes    Medication History Completed By: Harpal Graham 3/15/2025 3:13 PM    PTA Med List   Medication Sig Last Dose/Taking    loratadine (CLARITIN) 10 MG tablet Take 10 mg by mouth every other day. 3/14/2025 Morning

## 2025-03-15 NOTE — ED PROVIDER NOTES
eMERGENCY dEPARTMENT PROGRESS NOTE         ED COURSE AND MEDICAL DECISION MAKING  Patient was signed out to me by Dr Griems at 3:06 PM      Tierney Pearson is a 64 year old female who presents for evaluation of some dizziness as well as some left foot drop and some left hand weakness.  She had symptoms for 4 days.  CT angiogram did not show any acute process.  MRI has been ordered and is pending at this time.    ED Course as of 03/15/25 1625   Sat Mar 15, 2025   1528 I spoke with [place] radiology Dr. Amaya who reports the patient has several small infarcts and 1 in the right aleksander likely causing her issues. I will get her admitted to the hospital for further stroke evaluation.   1536 We will likely have a neuro telemetry bed here at [facility].  I will put a call out to stroke neurology and the hospitalist and we will get the patient admitted to the hospital.  She is willing to stay in the hospital and comfortable with the plan.   1558 I discussed the case with Dr. SAVAGE with hospitalist service.  He recommends neuro telemetry observation admission.   1603 I spoke with stroke neurology.  They recommended 325 aspirin and recommended CT chest abdomen pelvis to look for any signs of malignancy given the multifocal strokes.  I will get those ordered.     LAB  Pertinent labs results reviewed   Labs Ordered and Resulted from Time of ED Arrival to Time of ED Departure   BASIC METABOLIC PANEL - Abnormal       Result Value    Sodium 135      Potassium 4.0      Chloride 101      Carbon Dioxide (CO2) 27      Anion Gap 7      Urea Nitrogen 7.0 (*)     Creatinine 0.54      GFR Estimate >90      Calcium 9.5      Glucose 85     TROPONIN T, HIGH SENSITIVITY - Normal    Troponin T, High Sensitivity 7     TROPONIN T, HIGH SENSITIVITY - Normal    Troponin T, High Sensitivity 8     CBC WITH PLATELETS AND DIFFERENTIAL    WBC Count 6.0      RBC Count 5.05      Hemoglobin 15.4      Hematocrit 45.4      MCV 90      MCH 30.5      MCHC 33.9      RDW  12.8      Platelet Count 369      % Neutrophils 70      % Lymphocytes 19      % Monocytes 6      % Eosinophils 5      % Basophils 0      % Immature Granulocytes 0      NRBCs per 100 WBC 0      Absolute Neutrophils 4.2      Absolute Lymphocytes 1.1      Absolute Monocytes 0.3      Absolute Eosinophils 0.3      Absolute Basophils 0.0      Absolute Immature Granulocytes 0.0      Absolute NRBCs 0.0             RADIOLOGY    Pertinent imaging reviewed   Please see official radiology report.  MR Brain w/o & w Contrast   Final Result   IMPRESSION:      1.  Multiple foci of diffusion restriction within the cerebral hemispheres and right david-aleksander are favored to represent late hyperacute to acute infarctions.   2.  Advanced small vessel ischemic disease within the cerebral white matter.      These findings were communicated over the phone to Dr. Agee at 3:30 PM on 03/15/2025 by Erick Amaya.      CTA Head Neck with Contrast   Final Result   CONCLUSION:   HEAD CT:   1.  No hemorrhage.   2.  There are numerous lacunar infarcts within the bilateral basal ganglia that appear to have been largely present previously. MRI would be more sensitive for recent infarct in this patient.   3.  Advanced dental disease.      HEAD CTA:   1.  No large vessel occlusion.   2.  Mild intracranial atherosclerosis.   3.  2.5 mm laterally directed outpouching arising from the distal right basilar artery could represent a small aneurysm or residual opacification of a stenotic or partially occluded right P1 segment. An aneurysm is favored.      NECK CTA:   1.  Mild carotid artery atherosclerotic disease. No hemodynamically significant narrowing in either carotid artery by NASCET criteria.   2.  Moderate atherosclerotic stenosis of the dominant right vertebral artery origin.         Echocardiogram Complete - For age > 60 yrs    (Results Pending)   CT Chest/Abdomen/Pelvis w Contrast    (Results Pending)       FINAL IMPRESSION    1. Dizziness    2.  Left-sided weakness         DISCHARGE MEDICATIONS  New Prescriptions    No medications on file        Arleth Agee MD  03/15/25 6024

## 2025-03-15 NOTE — ED PROVIDER NOTES
EMERGENCY DEPARTMENT ENCOUNTER      NAME: Tierney Pearson  AGE: 64 year old female  YOB: 1960  MRN: [id number]  EVALUATION DATE & TIME: 3/15/2025 10:36 AM    PCP: Lauren Weldon    ED PROVIDER: Daiana Grimes DO      Chief Complaint   Patient presents with    Dizziness         FINAL IMPRESSION:  1. Dizziness    2. Left-sided weakness          ED COURSE & MEDICAL DECISION MAKING:    Pertinent Labs & Imaging studies reviewed. (See chart for details)  11:02 AM I met the patient and performed my initial interview and exam.  1:55 PM reassessed.  Did ambulate with the tach to the bathroom.  Felt unsteady and uses arm.  She was able to toilet herself.  Discussed results with patient.  She has no prior knowledge of previous strokes.  Agreeable with MRI.    64 year old female presents to the Emergency Department for evaluation of dizziness and feeling off.  She reports 4 days of worsening symptoms.  She feels like her left foot drags, difficulty using her left hand and feels dizzy.  She reports symptoms off and on over the past month but acutely worse over the past 4 days.  She notes some pain to her left back that she attributed to a musculoskeletal injury while she was lifting something.  She has muscular tenderness medial to her left scapula.  Not consistent with AAA or dissection.  Her NIH is 0 at this time.  She is outside of any window for a stroke code.  Certainly highest concern would be for a stroke, mass, bleed, dissection.  CTA imaging of the head and neck ordered which shows numerous lacunar infarcts within the bilateral basal ganglia that appeared to have been largely private previously.  No LVO.  2.5 mm outpouching at the distal right basilar artery likely small aneurysm..  EKG without evidence arrhythmia or ischemia.  Overall reassuring.  Will obtain MRI imaging to further evaluate for stroke, signed out to oncoming provider pending these results.    At the conclusion of the encounter I  "discussed the results of all of the tests and the disposition. The questions were answered. The patient or family acknowledged understanding and was agreeable with the care plan.     Medical Decision Making  Obtained supplemental history:Supplemental history obtained?: No  Reviewed external records: External records reviewed?: Documented in chart  Care impacted by chronic illness:Hypertension  Did you consider but not order tests?: Work up considered but not performed and documented in chart, if applicable  Did you interpret images independently?: Independent interpretation of ECG and images noted in documentation, when applicable.  Consultation discussion with other provider:Did you involve another provider (consultant, , pharmacy, etc.)?: No  Admission considered. Patient was signed out to the oncoming physician, disposition pending.    MIPS (CTPE, Dental pain, Teixeira, Sinusitis, Asthma/COPD, Head Trauma): Not Applicable    SEPSIS: None      MEDICATIONS GIVEN IN THE EMERGENCY:  Medications   iopamidol (ISOVUE-370) solution 67 mL (67 mLs Intravenous $Given 3/15/25 7522)       NEW PRESCRIPTIONS STARTED AT TODAY'S ER VISIT  New Prescriptions    No medications on file          =================================================================    HPI    Patient information was obtained from: Patient    Use of : N/A         Tierney Pearson is a 64 year old female with a pertinent history of HTN who presents to this ED by walk in for evaluation of dizziness.    The patient reports one month of dizziness when standing and tripping described as \"feet dragging\" that have been worse in the past four days (since 3/11). Initially, she reports dizziness but later describes it as more of a light headedness. She also states that her left hand feels numb and has not been working like normal. Patient denies any recent falls.    On 3/12 (3 days ago), she was moving stuff and twisted her back. Since then, she endorses " "soreness that started in her back that has now gone up to her neck. She has taken ibuprofen for pain without relief.     She is not on any daily medications and has no other concerns at this time.       REVIEW OF SYSTEMS   Per HPI    PAST MEDICAL HISTORY:  Past Medical History:   Diagnosis Date    Hypertension        PAST SURGICAL HISTORY:  Past Surgical History:   Procedure Laterality Date    NO HISTORY OF SURGERY             CURRENT MEDICATIONS:    acetaminophen (TYLENOL) 325 MG tablet  amLODIPine (NORVASC) 2.5 MG tablet  aspirin 81 MG EC tablet  Loratadine-Pseudoephedrine (CLARITIN-D 12 HOUR PO)  nicotine (NICODERM CQ) 21 MG/24HR 24 hr patch  thiamine 50 MG TABS         ALLERGIES:  Allergies   Allergen Reactions    Ciprofloxacin      Rash and hives.    Influenza Virus Vaccine Other (See Comments)     Broke out in hives    Seasonal Allergies     Polymyxin B Hives and Rash       FAMILY HISTORY:  Family History   Problem Relation Age of Onset    Breast Cancer Mother     Heart Disease Father         issues    Breast Cancer Sister        SOCIAL HISTORY:   Social History     Socioeconomic History    Marital status: Single    Number of children: 0    Years of education: 14   Occupational History    Occupation: Behavioral Coordinator     Employer: [employer]   Tobacco Use    Smoking status: Former     Current packs/day: 0.00     Average packs/day: 0.2 packs/day for 30.0 years (6.0 ttl pk-yrs)     Types: Cigarettes     Start date: 1969     Quit date: 1999     Years since quittin.2    Smokeless tobacco: Never   Substance and Sexual Activity    Alcohol use: Yes     Comment: Occasional on the weekends - 12 pack    Drug use: No    Sexual activity: Yes     Partners: Male   Other Topics Concern    Parent/sibling w/ CABG, MI or angioplasty before 65F 55M? No       VITALS:  BP (!) 175/81   Pulse 58   Temp 98.6  F (37  C) (Oral)   Resp 20   Ht 1.676 m (5' 6\")   Wt 72.6 kg (160 lb)   SpO2 98%   BMI 25.82 kg/m  "     PHYSICAL EXAM    Physical Exam  Constitutional:       General: She is not in acute distress.  HENT:      Head: Normocephalic and atraumatic.      Mouth/Throat:      Pharynx: Oropharynx is clear.   Eyes:      Pupils: Pupils are equal, round, and reactive to light.   Cardiovascular:      Rate and Rhythm: Normal rate and regular rhythm.      Pulses: Normal pulses.      Heart sounds: Normal heart sounds.   Pulmonary:      Effort: Pulmonary effort is normal.      Breath sounds: Normal breath sounds.   Abdominal:      General: Abdomen is flat. Bowel sounds are normal.      Palpations: Abdomen is soft.      Tenderness: There is no abdominal tenderness.   Musculoskeletal:         General: Normal range of motion.   Skin:     General: Skin is warm and dry.      Capillary Refill: Capillary refill takes less than 2 seconds.   Neurological:      Mental Status: She is alert and oriented to person, place, and time.      Comments: See NIHSS  Mild dysmetria LUE        National Institutes of Health Stroke Scale  Exam Interval: Baseline   Score    Level of consciousness: (0)   Alert, keenly responsive    LOC questions: (0)   Answers both questions correctly    LOC commands: (0)   Performs both tasks correctly    Best gaze: (0)   Normal    Visual: (0)   No visual loss    Facial palsy: (0)   Normal symmetrical movements    Motor arm (left): (0)   No drift    Motor arm (right): (0)   No drift    Motor leg (left): (0)   No drift    Motor leg (right): (0)   No drift    Limb ataxia: (1)   Present in one limb    Sensory: (0)   Normal- no sensory loss    Best language: (0)   Normal- no aphasia    Dysarthria: (0)   Normal    Extinction and inattention: (0)   No abnormality        Total Score:  1         LAB:  All pertinent labs reviewed and interpreted.  Labs Ordered and Resulted from Time of ED Arrival to Time of ED Departure   BASIC METABOLIC PANEL - Abnormal       Result Value    Sodium 135      Potassium 4.0      Chloride 101       Carbon Dioxide (CO2) 27      Anion Gap 7      Urea Nitrogen 7.0 (*)     Creatinine 0.54      GFR Estimate >90      Calcium 9.5      Glucose 85     TROPONIN T, HIGH SENSITIVITY - Normal    Troponin T, High Sensitivity 7     TROPONIN T, HIGH SENSITIVITY - Normal    Troponin T, High Sensitivity 8     CBC WITH PLATELETS AND DIFFERENTIAL    WBC Count 6.0      RBC Count 5.05      Hemoglobin 15.4      Hematocrit 45.4      MCV 90      MCH 30.5      MCHC 33.9      RDW 12.8      Platelet Count 369      % Neutrophils 70      % Lymphocytes 19      % Monocytes 6      % Eosinophils 5      % Basophils 0      % Immature Granulocytes 0      NRBCs per 100 WBC 0      Absolute Neutrophils 4.2      Absolute Lymphocytes 1.1      Absolute Monocytes 0.3      Absolute Eosinophils 0.3      Absolute Basophils 0.0      Absolute Immature Granulocytes 0.0      Absolute NRBCs 0.0         RADIOLOGY:  Reviewed all pertinent imaging. Please see official radiology report.  CTA Head Neck with Contrast   Final Result   CONCLUSION:   HEAD CT:   1.  No hemorrhage.   2.  There are numerous lacunar infarcts within the bilateral basal ganglia that appear to have been largely present previously. MRI would be more sensitive for recent infarct in this patient.   3.  Advanced dental disease.      HEAD CTA:   1.  No large vessel occlusion.   2.  Mild intracranial atherosclerosis.   3.  2.5 mm laterally directed outpouching arising from the distal right basilar artery could represent a small aneurysm or residual opacification of a stenotic or partially occluded right P1 segment. An aneurysm is favored.      NECK CTA:   1.  Mild carotid artery atherosclerotic disease. No hemodynamically significant narrowing in either carotid artery by NASCET criteria.   2.  Moderate atherosclerotic stenosis of the dominant right vertebral artery origin.         MR Brain w/o & w Contrast    (Results Pending)       EKG:    Performed at: 3/15/2025 at 10:55:53    Impression: Sinus  rhythm. Nonspecific ST abnormality. Abnormal ECG.     Rate: 70 BPM  Rhythm: Sinus  Axis: 69  CT Interval: 148 ms  QRS Interval: 76 ms  QTc Interval: 434 ms  ST Changes: Nonspecific ST abnormality  Comparison: When compared with ECG from 11/12/2013 at 11:00, questionable change in QRS axis.     I have independently reviewed and interpreted the EKG(s) documented above.      I, Leanna Andres, am serving as a scribe to document services personally performed by Dr. Daiana Grimes based on my observation and the provider's statements to me. IDaiana, DO attest that Leanna Andres is acting in a scribe capacity, has observed my performance of the services and has documented them in accordance with my direction.    Daiana Grimes DO  Emergency Medicine  [facility] EMERGENCY DEPARTMENT  [address] [place] [id number]  [phone]  Dept: [phone]       Daiana Grimes DO  03/15/25 7665

## 2025-03-15 NOTE — CONSULTS
Care Management Initial Consult    General Information  Assessment completed with: Patient, Spouse or significant other, Toño Perdomo  Type of CM/SW Visit: Initial Assessment    Primary Care Provider verified and updated as needed: Yes   Readmission within the last 30 days: no previous admission in last 30 days      Reason for Consult: discharge planning  Advance Care Planning: Advance Care Planning Reviewed: no concerns identified          Communication Assessment  Patient's communication style: spoken language (English or Bilingual)             Cognitive  Cognitive/Neuro/Behavioral: .WDL except  Level of Consciousness: alert  Arousal Level: opens eyes spontaneously  Orientation: person, time, place, situation, oriented x 4  Mood/Behavior: calm, cooperative     Speech: logical, clear    Living Environment:   People in home: significant other     Current living Arrangements: house (one level plus basement, 3 steps to enter)      Able to return to prior arrangements: yes       Family/Social Support:  Care provided by: self  Provides care for: no one  Marital Status: Lives with Significant Other  Support system: Significant Other          Description of Support System: Supportive, Involved    Support Assessment: Adequate family and caregiver support, Patient communicates needs well met    Current Resources:   Patient receiving home care services: No        Community Resources: None  Equipment currently used at home: none  Supplies currently used at home: None    Employment/Financial:  Employment Status: employed full-time        Financial Concerns: none   Referral to Financial Worker: No       Does the patient's insurance plan have a 3 day qualifying hospital stay waiver?  No    Lifestyle & Psychosocial Needs:  Social Drivers of Health     Food Insecurity: Not on file   Depression: Not at risk (10/10/2022)    PHQ-2     PHQ-2 Score: 1   Housing Stability: Not on file   Tobacco Use: Medium Risk (3/15/2025)     Patient History     Smoking Tobacco Use: Former     Smokeless Tobacco Use: Never     Passive Exposure: Not on file   Financial Resource Strain: Not on file   Alcohol Use: Not on file   Transportation Needs: Not on file   Physical Activity: Not on file   Interpersonal Safety: Not on file   Stress: Not on file   Social Connections: Not on file   Health Literacy: Not on file       Functional Status:  Prior to admission patient needed assistance:   Dependent ADLs:: Independent, Ambulation-no assistive device  Dependent IADLs:: Independent       Mental Health Status:  Mental Health Status: No Current Concerns       Chemical Dependency Status:  Chemical Dependency Status: No Current Concerns             Values/Beliefs:  Spiritual, Cultural Beliefs, Gnosticism Practices, Values that affect care: no               Discussed  Partnership in Safe Discharge Planning  document with patient/family: No    Additional Information:  CM consult received for discharge planning.  Met with patient and significant other Conor at bedside to introduce CM role and perform initial assessment.  Demographics verified and updated as needed.  Nay live in a rambler style house with 3 steps to enter.  Patient works full time in a [employer] facility and is independent at baseline.  No in-home services or equipment.  Anticipates returning home at discharge.  PT/OT recommendations pending.  S.O. can transport.      Next Steps: CM to follow for medical progression of care, discharge recommendations, and final discharge plan.      Polo José CRNI

## 2025-03-15 NOTE — PLAN OF CARE
Goal Outcome Evaluation:      Plan of Care Reviewed With: patient, significant other          Outcome Evaluation: Anticipate return home with significant other at discharge.

## 2025-03-15 NOTE — CONSULTS
"  [facility]    Stroke Telephone Note    I was called by Arleth Agee on 03/15/25 regarding patient Tierney Pearson. The patient is a 64 year old female with PMH of HTN, tobacco and paroxysmal supraventricular tachycardia who presents to the ED for evaluation of 4 days of L sided weakness and dizziness. MRI with multifocal infarcts in the bilateral cerebral hemispheres and R aleksander. CTA with no LVO or critical stenosis.    Vitals  BP: (!) 151/72   Pulse: 66   Resp: 18   Temp: 98.6  F (37  C)   Weight: 72.6 kg (160 lb)    Imaging Findings  Brain MRI:   1.  Multiple foci of diffusion restriction within the cerebral hemispheres and right david-aleksander   2.  Advanced small vessel ischemic disease within the cerebral white matter.    CTA head/neck: No LVO, significant stenosis or dissection     Impression  Multifocal infarcts in the bilateral cerebral hemispheres and R aleksander    Recommendations  - Use orderset: \"Ischemic Stroke Routine Admission\" or \"Ischemic Stroke No Thrombolytics/No Thrombectomy ICU Admission\"  - Place Neurology IP Stroke Consult order   - Neurochecks and Vital Signs every 4 hours   - Daily aspirin 325 mg for secondary stroke prevention  - no need for permissive HTN as has had 4 days of symptoms; inpatient BP goal <180 with gradual reduction to normotension  - given involvement of 3 vascular territories would recommend CT chest/abdomen/pelvis with contrast to assess for occult malignancy  - Statin: pending LDL  - TTE (with Bubble Study if age 60 yrs or less)  - Telemetry, EKG  - Bedside Glucose Monitoring  - A1c, Lipid Panel, Troponin x 3  - PT/OT/SLP  - Stroke Education  - Euthermia, Euglycemia    Case discussed with vascular neurology attending Dr. Alejo.    My recommendations are based on the information provided over the phone by Tierney Pearson's in-person providers. They are not intended to replace the clinical judgment of her in-person providers. I was not requested to " "personally see or examine the patient at this time.     VEL Ham CNP  Vascular Neurology    To page me or covering stroke neurology team member, click here: [facility]  Choose \"On Call\" tab at top, then select \"NEUROLOGY/ALL SITES\" from middle drop-down box, press Enter, then look for \"stroke\" or \"telestroke\" for your site.   "

## 2025-03-15 NOTE — ED NOTES
"[facility] ED Handoff Report    ED Chief Complaint: Left sided weakness, dizziness     ED Diagnosis:  (R42) Dizziness    (R53.1) Left-sided weakness    (I63.9) Acute ischemic stroke (H)      PMH:    Past Medical History:   Diagnosis Date    Hypertension         Code Status:  Full Code     Falls Risk: Yes Band: Applied    Current Living Situation/Residence: lives with a significant other     Elimination Status: Continent: wears briefs     Activity Level: SBA    Patients Preferred Language:  English     Needed: No    Vital Signs:  BP (!) 151/72   Pulse 66   Temp 98.6  F (37  C) (Oral)   Resp 18   Ht 1.676 m (5' 6\")   Wt 72.6 kg (160 lb)   SpO2 95%   BMI 25.82 kg/m       Cardiac Rhythm: Normal sinus    Pain Score: 0/10    Is the Patient Confused:  No    Last Food or Drink: 03/15/25 at 1730    Focused Assessment: A&O x 4. Elevated BP, all other vitals normal. No reported pain. [place] instability and unsteady when ambulating - ax1 to wheelchair for transport. All other neuro findings WDL. Minor incontinence of bladder - brief placed and discussed external cath placement later in evening. Tolerating oral intake well.     Tests Performed: Done: Labs and Imaging    Treatments Provided: Meds    Family Dynamics/Concerns: No    Plan of Care Communicated to Family: Yes    Additional Information: none    RN: Liliana Britt & Reggie Salazar  3/15/2025 5:57 PM        "

## 2025-03-15 NOTE — ED TRIAGE NOTES
"Pt presents with dizziness and feeling \"discombobulated\" x 4 days.     Triage Assessment (Adult)       Row Name 03/15/25 1033          Triage Assessment    Airway WDL WDL        Respiratory WDL    Respiratory WDL rhythm/pattern     Rhythm/Pattern, Respiratory shortness of breath                     "

## 2025-03-16 ENCOUNTER — APPOINTMENT (OUTPATIENT)
Dept: CT IMAGING | Facility: HOSPITAL | Age: 65
End: 2025-03-16
Attending: EMERGENCY MEDICINE
Payer: COMMERCIAL

## 2025-03-16 ENCOUNTER — APPOINTMENT (OUTPATIENT)
Dept: OCCUPATIONAL THERAPY | Facility: HOSPITAL | Age: 65
End: 2025-03-16
Attending: STUDENT IN AN ORGANIZED HEALTH CARE EDUCATION/TRAINING PROGRAM
Payer: COMMERCIAL

## 2025-03-16 ENCOUNTER — APPOINTMENT (OUTPATIENT)
Dept: CARDIOLOGY | Facility: HOSPITAL | Age: 65
End: 2025-03-16
Attending: STUDENT IN AN ORGANIZED HEALTH CARE EDUCATION/TRAINING PROGRAM
Payer: COMMERCIAL

## 2025-03-16 ENCOUNTER — APPOINTMENT (OUTPATIENT)
Dept: SPEECH THERAPY | Facility: HOSPITAL | Age: 65
End: 2025-03-16
Attending: STUDENT IN AN ORGANIZED HEALTH CARE EDUCATION/TRAINING PROGRAM
Payer: COMMERCIAL

## 2025-03-16 ENCOUNTER — APPOINTMENT (OUTPATIENT)
Dept: PHYSICAL THERAPY | Facility: HOSPITAL | Age: 65
End: 2025-03-16
Attending: STUDENT IN AN ORGANIZED HEALTH CARE EDUCATION/TRAINING PROGRAM
Payer: COMMERCIAL

## 2025-03-16 LAB
ANION GAP SERPL CALCULATED.3IONS-SCNC: 10 MMOL/L (ref 7–15)
BI-PLANE LVEF ECHO: NORMAL
BUN SERPL-MCNC: 9 MG/DL (ref 8–23)
CALCIUM SERPL-MCNC: 8.8 MG/DL (ref 8.8–10.4)
CHLORIDE SERPL-SCNC: 101 MMOL/L (ref 98–107)
CREAT SERPL-MCNC: 0.48 MG/DL (ref 0.51–0.95)
EGFRCR SERPLBLD CKD-EPI 2021: >90 ML/MIN/1.73M2
ERYTHROCYTE [DISTWIDTH] IN BLOOD BY AUTOMATED COUNT: 12.7 % (ref 10–15)
GLUCOSE BLDC GLUCOMTR-MCNC: 101 MG/DL (ref 70–99)
GLUCOSE BLDC GLUCOMTR-MCNC: 115 MG/DL (ref 70–99)
GLUCOSE BLDC GLUCOMTR-MCNC: 71 MG/DL (ref 70–99)
GLUCOSE BLDC GLUCOMTR-MCNC: 94 MG/DL (ref 70–99)
GLUCOSE SERPL-MCNC: 79 MG/DL (ref 70–99)
HCO3 SERPL-SCNC: 23 MMOL/L (ref 22–29)
HCT VFR BLD AUTO: 39.8 % (ref 35–47)
HGB BLD-MCNC: 13.6 G/DL (ref 11.7–15.7)
MCH RBC QN AUTO: 30.7 PG (ref 26.5–33)
MCHC RBC AUTO-ENTMCNC: 34.2 G/DL (ref 31.5–36.5)
MCV RBC AUTO: 90 FL (ref 78–100)
PLATELET # BLD AUTO: 310 10E3/UL (ref 150–450)
POTASSIUM SERPL-SCNC: 3.9 MMOL/L (ref 3.4–5.3)
RBC # BLD AUTO: 4.43 10E6/UL (ref 3.8–5.2)
SODIUM SERPL-SCNC: 134 MMOL/L (ref 135–145)
WBC # BLD AUTO: 5.1 10E3/UL (ref 4–11)

## 2025-03-16 PROCEDURE — 93306 TTE W/DOPPLER COMPLETE: CPT | Mod: 26 | Performed by: INTERNAL MEDICINE

## 2025-03-16 PROCEDURE — 92523 SPEECH SOUND LANG COMPREHEN: CPT | Mod: GN

## 2025-03-16 PROCEDURE — 97116 GAIT TRAINING THERAPY: CPT | Mod: GP

## 2025-03-16 PROCEDURE — 74177 CT ABD & PELVIS W/CONTRAST: CPT

## 2025-03-16 PROCEDURE — 120N000001 HC R&B MED SURG/OB

## 2025-03-16 PROCEDURE — G0378 HOSPITAL OBSERVATION PER HR: HCPCS

## 2025-03-16 PROCEDURE — 250N000013 HC RX MED GY IP 250 OP 250 PS 637: Performed by: STUDENT IN AN ORGANIZED HEALTH CARE EDUCATION/TRAINING PROGRAM

## 2025-03-16 PROCEDURE — 92507 TX SP LANG VOICE COMM INDIV: CPT | Mod: GN

## 2025-03-16 PROCEDURE — 36415 COLL VENOUS BLD VENIPUNCTURE: CPT | Performed by: STUDENT IN AN ORGANIZED HEALTH CARE EDUCATION/TRAINING PROGRAM

## 2025-03-16 PROCEDURE — 80048 BASIC METABOLIC PNL TOTAL CA: CPT | Performed by: STUDENT IN AN ORGANIZED HEALTH CARE EDUCATION/TRAINING PROGRAM

## 2025-03-16 PROCEDURE — 85018 HEMOGLOBIN: CPT | Performed by: STUDENT IN AN ORGANIZED HEALTH CARE EDUCATION/TRAINING PROGRAM

## 2025-03-16 PROCEDURE — 93306 TTE W/DOPPLER COMPLETE: CPT

## 2025-03-16 PROCEDURE — 82962 GLUCOSE BLOOD TEST: CPT

## 2025-03-16 PROCEDURE — 97162 PT EVAL MOD COMPLEX 30 MIN: CPT | Mod: GP

## 2025-03-16 PROCEDURE — 99233 SBSQ HOSP IP/OBS HIGH 50: CPT | Performed by: HOSPITALIST

## 2025-03-16 PROCEDURE — 250N000011 HC RX IP 250 OP 636: Performed by: EMERGENCY MEDICINE

## 2025-03-16 PROCEDURE — 71260 CT THORAX DX C+: CPT

## 2025-03-16 PROCEDURE — 97535 SELF CARE MNGMENT TRAINING: CPT | Mod: GO

## 2025-03-16 PROCEDURE — 92610 EVALUATE SWALLOWING FUNCTION: CPT | Mod: GN

## 2025-03-16 PROCEDURE — 82565 ASSAY OF CREATININE: CPT | Performed by: STUDENT IN AN ORGANIZED HEALTH CARE EDUCATION/TRAINING PROGRAM

## 2025-03-16 PROCEDURE — 97530 THERAPEUTIC ACTIVITIES: CPT | Mod: GP

## 2025-03-16 PROCEDURE — 97166 OT EVAL MOD COMPLEX 45 MIN: CPT | Mod: GO

## 2025-03-16 PROCEDURE — 85041 AUTOMATED RBC COUNT: CPT | Performed by: STUDENT IN AN ORGANIZED HEALTH CARE EDUCATION/TRAINING PROGRAM

## 2025-03-16 PROCEDURE — 250N000013 HC RX MED GY IP 250 OP 250 PS 637: Performed by: HOSPITALIST

## 2025-03-16 RX ORDER — IOPAMIDOL 755 MG/ML
90 INJECTION, SOLUTION INTRAVASCULAR ONCE
Status: COMPLETED | OUTPATIENT
Start: 2025-03-16 | End: 2025-03-16

## 2025-03-16 RX ORDER — ATORVASTATIN CALCIUM 40 MG/1
40 TABLET, FILM COATED ORAL EVERY EVENING
Status: DISCONTINUED | OUTPATIENT
Start: 2025-03-16 | End: 2025-03-17 | Stop reason: HOSPADM

## 2025-03-16 RX ADMIN — ATORVASTATIN CALCIUM 40 MG: 40 TABLET, FILM COATED ORAL at 20:16

## 2025-03-16 RX ADMIN — LORATADINE 10 MG: 10 TABLET ORAL at 08:25

## 2025-03-16 RX ADMIN — ACETAMINOPHEN 975 MG: 325 TABLET ORAL at 11:18

## 2025-03-16 RX ADMIN — ASPIRIN 325 MG ORAL TABLET 325 MG: 325 PILL ORAL at 08:24

## 2025-03-16 RX ADMIN — IOPAMIDOL 90 ML: 755 INJECTION, SOLUTION INTRAVENOUS at 05:49

## 2025-03-16 ASSESSMENT — ACTIVITIES OF DAILY LIVING (ADL)
ADLS_ACUITY_SCORE: 35
ADLS_ACUITY_SCORE: 32
ADLS_ACUITY_SCORE: 34
ADLS_ACUITY_SCORE: 31
ADLS_ACUITY_SCORE: 34
ADLS_ACUITY_SCORE: 35
ADLS_ACUITY_SCORE: 31
ADLS_ACUITY_SCORE: 35
ADLS_ACUITY_SCORE: 32
ADLS_ACUITY_SCORE: 32
ADLS_ACUITY_SCORE: 34
ADLS_ACUITY_SCORE: 32
ADLS_ACUITY_SCORE: 35
ADLS_ACUITY_SCORE: 34
ADLS_ACUITY_SCORE: 35
ADLS_ACUITY_SCORE: 31
ADLS_ACUITY_SCORE: 35
ADLS_ACUITY_SCORE: 34
ADLS_ACUITY_SCORE: 31
ADLS_ACUITY_SCORE: 34
ADLS_ACUITY_SCORE: 35
ADLS_ACUITY_SCORE: 32
ADLS_ACUITY_SCORE: 34

## 2025-03-16 NOTE — PLAN OF CARE
"Goal Outcome Evaluation:                      PRIMARY DIAGNOSIS: \"GENERIC\" NURSING  OUTPATIENT/OBSERVATION GOALS TO BE MET BEFORE DISCHARGE:  ADLs back to baseline: No    Activity and level of assistance: Up with standby assistance.    Pain status: Pain free.    Return to near baseline physical activity: Yes     Discharge Planner Nurse   Safe discharge environment identified: Yes  Barriers to discharge: Yes       Entered by: Suhail Kasper RN 03/16/2025 1:55 AM     Please review provider order for any additional goals.   Nurse to notify provider when observation goals have been met and patient is ready for discharge.  "

## 2025-03-16 NOTE — PROGRESS NOTES
03/16/25 1017   Appointment Info   Signing Clinician's Name / Credentials (PT) Argentina Vides PT   Quick Adds   Quick Adds Certification   Living Environment   People in Home significant other   Current Living Arrangements house   Home Accessibility stairs to enter home;stairs within home   Number of Stairs, Main Entrance 3   Stair Railings, Main Entrance railing on right side (ascending)   Number of Stairs, Within Home, Primary greater than 10 stairs   Stair Railings, Within Home, Primary railing on left side (ascending)   Living Environment Comments Ramble home with a basement.  (Pt sleeps on the lower level.)   Self-Care   Usual Activity Tolerance excellent   Current Activity Tolerance moderate   Equipment Currently Used at Home none  (walking)   Fall history within last six months no   Activity/Exercise/Self-Care Comment Indep with all mobility and ADLs and laundry.  Assisted with cleaning.  Pt does drive.   General Information   Onset of Illness/Injury or Date of Surgery 03/15/25   Referring Physician Robby Rowan MD   Patient/Family Therapy Goals Statement (PT) to go home   Pertinent History of Current Problem (include personal factors and/or comorbidities that impact the POC) Per the chart,  Tierney Pearson is a 64 year old female admitted on 3/15/2025. She presented to the ER for evaluation of dizziness and left hand weakness x 4 days.  Brain MRI showing multiple focal hyperacute-acute infarcts.      CVA with Multifocal Infarcts  Abnormal Head CTA  Presented to the ER for evaluation of 4 days of left-sided weakness and dizziness  -- MRI brain with multiple foci of diffusion restriction within the cerebral hemispheres and right david-aleksander are favored to represent late hyperacute to acute infarctions.  Advanced small vessel ischemic disease   Existing Precautions/Restrictions fall   Weight-Bearing Status - LLE weight-bearing as tolerated   Weight-Bearing Status - RLE weight-bearing as tolerated   Cognition    Orientation Status (Cognition) oriented to;person;place;situation;time   Cognitive Status Comments speech is slower   Pain Assessment   Patient Currently in Pain   (L LBP that goes into her shoulder.  Pt also reports having a HA she rates at a 5/10.)   Posture    Posture Comments good posture.   Range of Motion (ROM)   Range of Motion ROM is WNL   ROM Comment bilat LEs   Strength (Manual Muscle Testing)   Strength (Manual Muscle Testing) Deficits observed during functional mobility   Bed Mobility   Comment, (Bed Mobility) Supine>sit with SBA.  Pt did have some increased dizziness with sitting at the EOB and needed some time to rest.   Transfers   Comment, (Transfers) Sit<>stand with SEC  with min x1 with cues for hand placement.  Pt did have some dizziness with standing but she wanted to walk.   Gait/Stairs (Locomotion)   [place] Level (Gait) minimum assist (75% patient effort);1 person assist   Assistive Device (Gait) cane, straight   Distance in Feet (Gait) 10'   Pattern (Gait) swing-through   Deviations/Abnormal Patterns (Gait) gait speed decreased   Balance   Balance Comments Min A x 1 with the SEC .  Pt might do better with the FWW   Sensory Examination   Sensory Perception WNL   Sensory Perception Comments bilat LEs   Coordination   Coordination Comments dec coordination with L HKS test.   Muscle Tone   Muscle Tone no deficits were identified   Clinical Impression   Criteria for Skilled Therapeutic Intervention Yes, treatment indicated   PT Diagnosis (PT) Impaired functional mobility.   Influenced by the following impairments weakness, dec bal, dec endurance, incrased dizziness.   Functional limitations due to impairments bed mobility, transfers, gait and steps.   Clinical Presentation (PT Evaluation Complexity) evolving   Clinical Presentation Rationale Pt presents medically diagnosed.   Clinical Decision Making (Complexity) moderate complexity   Planned Therapy Interventions (PT) balance training;gait  training;home exercise program;neuromuscular re-education;stair training;strengthening;transfer training   Risk & Benefits of therapy have been explained evaluation/treatment results reviewed;care plan/treatment goals reviewed;risks/benefits reviewed;patient;participants voiced agreement with care plan   PT Total Evaluation Time   PT Eval, Moderate Complexity Minutes (12716) 15   Therapy Certification   Start of care date 03/16/25   Certification date from 03/16/25   Certification date to 03/23/25   Medical Diagnosis dizziness, acute ischemic stroke, L sided weakness   Physical Therapy Goals   PT Frequency Daily   PT Predicted Duration/Target Date for Goal Attainment 03/23/25   PT Goals Bed Mobility;Transfers;Gait;Stairs;PT Goal 1   PT: Bed Mobility Independent;Supine to/from sit;Rolling   PT: Transfers Modified independent;Sit to/from stand;Bed to/from chair;Assistive device   PT: Gait Minimal assist;Rolling walker;150 feet;Supervision/stand-by assist  (or possible SEC)   PT: Stairs Greater than 10 stairs;Rail on left;Assistive device;Minimal assist   Interventions   Interventions Quick Adds Gait Training;Therapeutic Activity   Therapeutic Activity   Therapeutic Activities: dynamic activities to improve functional performance Minutes (19085) 15   Treatment Detail/Skilled Intervention Sit<>stand x 3 reps with SEC with min A x 1 with cues for hand placement.  Toilet transfer with min A x 1 with increased time needed.  Pt did have some dizziness and was assisted back to bed.  BP taken 181/85 with HR of 65.  PT did tell nursing.  Call light left by the pt and bed alarm is on.   Gait Training   Gait Training Minutes (27805) 8   Symptoms Noted During/After Treatment (Gait Training) dizziness   Treatment Detail/Skilled Intervention Pt walked  slowly with SEC with min A x 1. Slight delay with L LE swing phase.  Pt might do better using a FWW. PT to try next visit.   Distance in Feet 15' and 20'  (Pt did rest between  walks.)   [place] Level (Gait Training) minimum assist (75% patient effort)   Physical Assistance Level (Gait Training) verbal cues;1 person assist   Weight Bearing (Gait Training) weight-bearing as tolerated   Assistive Device (Gait Training) straight cane   Pattern Analysis (Gait Training) swing-through gait   Gait Analysis Deviations decreased linnette;decreased velocity of limb motion   Impairments (Gait Analysis/Training) balance impaired;pain;strength decreased   PT Discharge Planning   PT Plan gait with the FWW , try SEC if to good for the FWW, bal, bilat LE ex. transfers, and steps as able.  Pt had dizziness   PT Discharge Recommendation (DC Rec) Transitional Care Facility   PT Rationale for DC Rec The pt does need asist with transfers and gait.  TCU is recommended at this point.  Pt could benefit from a FWW or possible cane at [place].  She does need continued PT to improve mobility, bal and strength.   PT Brief overview of current status PT eval, bed mobility with SBA, transfers and gait w a SEC. toilet transfers with min A x1. Pt had  dizziness with mobility, vitals taken.   PT Total Distance Amb During Session (feet) 45   PT Equipment Needed at Discharge cane, straight;walker, rolling  (FWW vs SEC TBD)   Physical Therapy Time and Intention   Timed Code Treatment Minutes 23   Total Session Time (sum of timed and untimed services) 38   [facility]                                                                                   OUTPATIENT PHYSICAL THERAPY    PLAN OF TREATMENT FOR OUTPATIENT REHABILITATION   Patient's Last Name, First Name, Tierney Pederson YOB: 1960   Provider's Name   [facility]   Medical Record No.  [id number]     Onset Date: 03/15/25 Start of Care Date: 03/16/25     Medical Diagnosis:  dizziness, acute ischemic stroke, L sided weakness               PT Diagnosis:  Impaired functional mobility. Certification  Dates:  From: 03/16/25  To: 03/23/25       See note for plan of treatment, functional goals, and certification details.    I CERTIFY THE NEED FOR THESE SERVICES FURNISHED UNDER        THIS PLAN OF TREATMENT AND WHILE UNDER MY CARE (Physician co-signature of this document indicates review and certification of the therapy plan).

## 2025-03-16 NOTE — PROGRESS NOTES
PT REC is TCU and MD thinks pt could benefit from Speech therapy. SW met in room with pt and provided [facility] list. Confirmed she has [employer] insurance (the one without auth needed, writer thinks). SW explained benefit of TCU. Pt says she works in a [employer] facility and wonders if she could just get PT there during her lunch break. SW explained that TCU is more therapy and insurance would cover up to 20 days. She's never been before. She's worked at [employer] before.     Pt expressed desire to think about it overnight.     CM to meet with pt for discussion RE: TCU vs home w/ home care and get preferences if desired.

## 2025-03-16 NOTE — PHARMACY-CONSULT NOTE
Pharmacy Consult to evaluate for medication related stroke core measures    Tierneywesley Pearson, 64 year old female admitted for CVA on 3/15/2025.    Thrombolytic was not given because of Time from onset contraindications    VTE Prophylaxis SCDs /PCDs placed on 3/15/25, as appropriate prior to end of hospital day 2.    Antithrombotic: aspirin started on 3/15/25, as appropriate by end of hospital day 2. Continue antithrombotic therapy on discharge to meet quality measures, unless contraindicated.    Anticoagulation if history of A-fib/flutter: Patient does not have history of A-fib/flutter - anticoagulation not required for medication related stroke core measures.     LDL Cholesterol Calculated   Date Value Ref Range Status   03/15/2025 86 <100 mg/dL Final       Statin not started yet, LDL 86    Recommendations:  Consider starting statin    Thank you for the consult.    MIKAELA CHOPRA RPH 3/16/2025 1:43 PM

## 2025-03-16 NOTE — PROGRESS NOTES
[facility]    Medicine Progress Note - Hospitalist Service    Date of Admission:  3/15/2025    Assessment & Plan                Tierney Pearson is a 64 year old female with hypertension, nicotine dependence, pSVT presented for evaluation of dizziness and left hand weakness x 4 days.  Brain MRI showed multiple focal hyperacute-acute infarcts. Hospital Day: 2       CVA with Multifocal Infarcts  Abnormal Head CTA  Presented to the ER for evaluation of 4 days of left-sided weakness and dizziness  -- MRI brain with multiple foci of diffusion restriction within the cerebral hemispheres and right david-aleksander are favored to represent late hyperacute to acute infarctions.  Advanced small vessel ischemic disease  -- Noted head CTA with 2.5 mm laterally directed outpouching arising from the distal right basilar artery could represent a small aneurysm or residual opacification of a stenotic or partially occluded right P1 segment. An aneurysm is favored   -- Neck CTA mild carotid artery atherosclerotic disease, moderate atherosclerotic stenosis of right vertebral artery origin  -- Stroke neuro consulted in the ER ; appreciate recommendation  -- Inpatient neurology consult placed; not seen yet, consult reentered  -- Neurochecks and Vital Signs every 4 hours   -- Daily  mg  -- BP goal <180 with gradual reduction to normotension  -- Statin: LDL 83, start atorvastatin 40mg  -- Echo wnl, defer to neuro if ALEXUS needed  -body CT no sign of occult malignancy, but she should get up to date with age-appropriate cancer screening in case there is anything subtle not visible on gross imaging (sounds like she is due for both mammogram and colonoscopy)  -- Telemonitoring. If no A fib here, would have her discharge with holter  -- A1c 4.8%  -- PT/OT/SLP  -will benefit from TCU, cleared OT eval but still needs PT and significant speech and swallow deficits     Hypertensive urgency   in the ER  -From stroke  --  Per stroke neuro, no need for permissive HTN given symptoms ongoing for 4 days, goal SBP < 180  -- As needed hydralazine  -- Monitor, no PTA meds    Mild hyponatremia  -Not new  -No lung findings seen on CT  -No further rec at this time     Incidental findings on imaging - advanced dental disease. Outpatient follow-up           Diet: Regular Diet Adult    DVT Prophylaxis: Moderate risk.   Pneumatic Compression Devices  Te[place]eira Catheter: Not present  Lines: None     Cardiac Monitoring: ACTIVE order. Indication: Stroke, acute (48 hours)  Code Status: No CPR- Do NOT Intubate      Clinically Significant Risk Factors Present on Admission         # Hyponatremia: Lowest Na = 134 mmol/L in last 2 days, will monitor as appropriate           # Hypertension: Noted on problem list               # Financial/Environmental Concerns: none         Disposition Plan     Medically Ready for Discharge: Anticipated Tomorrow         Discharge barrier(s): neuro eval, ?ALEXUS, placement  Care discussed with: patient      Blanche Suarez MD  Hospitalist Service  [facility]  Securely message with [employer] (more info)  Text page via [employer] Paging/Directory   ______________________________________________________________________      Physical Exam   Vital Signs: Temp: 98.1  F (36.7  C) Temp src: Oral BP: (!) 147/81 (nurse notified) Pulse: 66   Resp: 17 SpO2: 94 % O2 Device: None (Room air)    Weight: 149 lbs 4.02 oz    General: in no apparent distress, non-toxic, and alert female lying in hospital bed oriented x3  HEENT: Head normocephalic atraumatic, oral mucosa moist. Sclerae anicteric. Poor dentition  CV: Regular rhythm, normal rate, no murmurs  Resp: No wheezes, no rales or rhonchi, no focal consolidations  GI: Belly soft, nondistended, nontender, bowel sounds present  Skin: No rashes or lesions  Extremities: No peripheral edema  Psych: Normal affect, mood euthymic  Neuro: facial droop and at times dysarthric  speech      Medical Decision Making               Data   Recent Results (from the past 16 hours)   Glucose by meter    Collection Time: 03/16/25  5:52 AM   Result Value Ref Range    GLUCOSE BY METER POCT 71 70 - 99 mg/dL   CBC with platelets    Collection Time: 03/16/25  6:19 AM   Result Value Ref Range    WBC Count 5.1 4.0 - 11.0 10e3/uL    RBC Count 4.43 3.80 - 5.20 10e6/uL    Hemoglobin 13.6 11.7 - 15.7 g/dL    Hematocrit 39.8 35.0 - 47.0 %    MCV 90 78 - 100 fL    MCH 30.7 26.5 - 33.0 pg    MCHC 34.2 31.5 - 36.5 g/dL    RDW 12.7 10.0 - 15.0 %    Platelet Count 310 150 - 450 10e3/uL   Basic metabolic panel    Collection Time: 03/16/25  6:19 AM   Result Value Ref Range    Sodium 134 (L) 135 - 145 mmol/L    Potassium 3.9 3.4 - 5.3 mmol/L    Chloride 101 98 - 107 mmol/L    Carbon Dioxide (CO2) 23 22 - 29 mmol/L    Anion Gap 10 7 - 15 mmol/L    Urea Nitrogen 9.0 8.0 - 23.0 mg/dL    Creatinine 0.48 (L) 0.51 - 0.95 mg/dL    GFR Estimate >90 >60 mL/min/1.73m2    Calcium 8.8 8.8 - 10.4 mg/dL    Glucose 79 70 - 99 mg/dL   Glucose by meter    Collection Time: 03/16/25  8:06 AM   Result Value Ref Range    GLUCOSE BY METER POCT 94 70 - 99 mg/dL   Echocardiogram Complete - For age > 60 yrs    Collection Time: 03/16/25  2:27 PM   Result Value Ref Range    Biplane LVEF 66%        Interval History     Patient reports a little bit of improvement in her left-sided weakness since admission.  Ongoing dysarthria and difficulty swallowing, she notes less trouble swallowing when she sits fully upright.  She notes she has never had a colonoscopy and has not had a mammogram in about 10 years.  No sign of cancer on her body CT however, informed patient of this.  Discussed possible need for ALEXUS.  Not ready for discharge.  Appears will need TCU and will ask care manager to start the process of setting this up.

## 2025-03-16 NOTE — PLAN OF CARE
"PRIMARY DIAGNOSIS: \"GENERIC\" NURSING  OUTPATIENT/OBSERVATION GOALS TO BE MET BEFORE DISCHARGE:  ADLs back to baseline: No    Activity and level of assistance: Up with standby assistance.    Pain status: Pain free.    Return to near baseline physical activity: Yes     Discharge Planner Nurse   Safe discharge environment identified: Yes  Barriers to discharge: Yes       Entered by: Suhail Kasper RN 03/16/2025 4:27 AM     Please review provider order for any additional goals.   Nurse to notify provider when observation goals have been met and patient is ready for discharge.Goal Outcome Evaluation:       Pt alert/oriented, denies pain. NSR on tele, scores 1 on NIH for mild L sided facial droop. CT chest completed.                  "

## 2025-03-16 NOTE — CARE PLAN
"PRIMARY DIAGNOSIS: \"GENERIC\" NURSING  OUTPATIENT/OBSERVATION GOALS TO BE MET BEFORE DISCHARGE:  ADLs back to baseline: No    Activity and level of assistance: Up with standby assistance.    Pain status: Pain free.    Return to near baseline physical activity: Yes     Discharge Planner Nurse   Safe discharge environment identified: Yes  Barriers to discharge: Yes        Please review provider order for any additional goals.   Nurse to notify provider when observation goals have been met and patient is ready for discharge.  "

## 2025-03-16 NOTE — PROGRESS NOTES
"Speech-Language Pathology:  Clinical Swallow Evaluation and Speech Language Cognitive Evaluation     03/16/25 1100   Appointment Info   Signing Clinician's Name / Credentials (SLP) RUBA Grant Student   Student Supervision Direct supervision provided   General Information   Onset of Illness/Injury or Date of Surgery 03/15/25   Referring Physician Robby Rowan MD   Pertinent History of Current Problem Per EMR: \"Tierney Pearson is a 64 year old female who presented to the ER on 3/15/2025 for evaluation of 4 days of dizziness and left-sided weakness.  Patient states symptoms initially started when she was moving something, felt like she twisted her back and developed left-sided back tightness that progressed to her left side with associated left and leg arm weakness.  Endorses difficulty with writing (patient is left-handed) and associated \"kaleidoscope\" vision in her left eye.  She also endorses feeling like she has noticed changes in her speech over the past several days.       Updated significant other who was in the room with the patient and answered questions including plans for this hospitalization.\"   General Observations Patient was sitting upright in bed. Overall alert, engaged and cooperative throughout assessment.   Type of Evaluation   Type of Evaluation Swallow Evaluation;Speech, Language, Cognition   Oral Motor   Oral Musculature generally intact   Structural Abnormalities none present   Mucosal Quality good   Oral Motor Deficits Observed Lip Function (Oral Motor) (Group)   Dentition (Oral Motor)   Comment, Dentition (Oral Motor) Loose bottom tooth   Dentition (Oral Motor) some missing teeth;dental appliance/dentures   Dental Appliance/Denture (Oral Motor) upper;dentures, partial   Facial Symmetry (Oral Motor)   Facial Symmetry (Oral Motor) WNL;left side impairment   Comment, Facial Symmetry (Oral Motor) left-sided weakness   Left Side Facial Asymmetry minimal impairment   Lip Function (Oral " Motor)   Lip Range of Motion (Oral Motor) WNL;retraction impairment   Lip Strength (Oral Motor) WFL   Retraction, Lip Range of Motion minimal impairment  (reduced strength)   Tongue Function (Oral Motor)   Tongue Strength (Oral Motor) WFL   Tongue Coordination/Speed (Oral Motor) WNL   Tongue ROM (Oral Motor) WNL   Jaw Function (Oral Motor)   Jaw Function (Oral Motor) WNL   Cough/Swallow/Gag Reflex (Oral Motor)   Soft Palate/Velum (Oral Motor) WNL   Volitional Throat Clear/Cough (Oral Motor) WNL   Volitional Swallow (Oral Motor) WNL   Vocal Quality/Secretion Management (Oral Motor)   Vocal Quality (Oral Motor) WFL   Secretion Management (Oral Motor) WNL   General Swallowing Observations   Past History of Dysphagia None per EMR   Respiratory Support room air   Current Diet/Method of Nutritional Intake (General Swallowing Observations, NIS) thin liquids (level 0);regular diet   Swallowing Evaluation Clinical swallow evaluation   Clinical Swallow Evaluation   Feeding Assistance no assistance needed   Additional evaluation(s) completed today Yes  (Speech evaluation)   Clinical Swallow Evaluation Textures Trialed thin liquids;solid foods   Clinical Swallow Eval: Thin Liquid Texture Trial   Mode of Presentation, Thin Liquids cup;straw;self-fed   Volume of Liquid or Food Presented 4 oz   Oral Phase of Swallow WFL   Pharyngeal Phase of Swallow intact   Diagnostic Statement no s/s of aspiration   Clinical Swallow Evaluation: Solid Food Texture Trial   Mode of Presentation self-fed   Volume Presented 1/2 christina cracker   Oral Phase WFL   Pharyngeal Phase intact   Diagnostic Statement no s/s of aspiration   Swallowing Recommendations   Diet Consistency Recommendations thin liquids (level 0);regular diet   Supervision Level for Intake patient independent   Mode of Delivery Recommendations bolus size, small   Recommended Feeding/Eating Techniques (Swallow Eval) maintain upright sitting position for eating   Motor Speech   Vocal  Loudness (Motor Speech) WFL   Speech Intelligibility (Motor Speech) WFL;phrase/sentence level;conversational level  (minimal slurred speech. May improve once partial dentures are fitting better. Pt. stated they were loose from sleeping in them.)   Breath Support (Motor Speech) intact   Resonance (Motor Speech) WNL   Speech Fluency (Motor Speech) WFL  (minimal slurring in speech either due to dysarthria or partial dentures fitting incorrectly. Pt. stated they felt loose from sleeping in them during the night.)   Articulation (Motor Speech) WNL   Phonation (motor speech) Adequate   Conversational Level, Speech Intelligibility (Motor Speech) minimal impairment  (minimal slurring.)   Phrase/Sentence Level, Speech Intelligibility (Motor Speech) minimal impairment  (minimal slurring)   Western Aphasia Battery- Revised Bedside Record From   Spontaneous Speech Content Score (out of 10) 9   Spontaneous Speech Fluency Score (out of 10) 9   Auditory Verbal Comprehension Score (out of 10) 10   Sequential Commands Score (out of 10) 10   Repetition Score (out of 10) 9   Object Naming Score (out of 10) 10   Bedside Aphasia Sum 57   WAB-R Bedside Aphasia Score 95   Reading Score (out of 10) 10   Writing Score (out of 10) 9   Bedside Language Sum 76   Bedside Language Score 95   Apraxia Score - optional (out of 10) 10   Bedside Aphasia Classification Anomic Aphasia   Aphasia Severity Level Mild Aphasia   Comments Pt. completed WAB Assessment. Received Aphasia Classification of Anomic Aphasia. She demonstrated no difficulty with entire assessment both AC and EC are WFL. Pt. stated that testing felt easy and what it would feel like at baseline level. Pt demonstrated most difficulty with fluency and in expressing details on images. Scores in this area received 9/10. Scores could be impacted by Patient's partial upper denture that is not fitting properly. Pt. reported that it feels loose due to sleeping with it at night. Pt. has minimal  slurring in her speech which could be due to dysarthria or partial dentures fitting loosly. When provided with strategies, Pt. was receptive and when utilized, demonstrated improved speech clarity. Pt. additionally completed cancellation task to rule out visual difficulties. No visual neglect or field cuts observed. Pt. reported blurry, cloudy vision from left eye and uses reading glasses to help. Pt. reported difficulty with writing due to left-sided weakness and being left-handed. During writing task Pt. was able to write complete phrases with correct spelling and overall writing was legible.   General Therapy Interventions   Planned Therapy Interventions Communication   Communication Improve speech intelligibility   Clinical Impression   Criteria for Skilled Therapeutic Interventions Met (SLP Eval) Yes, treatment indicated   Risks & Benefits of therapy have been explained evaluation/treatment results reviewed;care plan/treatment goals reviewed;participants voiced agreement with care plan;participants included;patient   Clinical Impression Comments Clinical Swallow Evaluation completed. Patient had no s/s aspiration with thin liquids and regular solid textures. Oral motor function was WFL. Mastication was functional and safe. Pt. chews food on left side of mouth due to loose tooth on bottom right.  Hyolaryngeal elevation appears present upon visualization and palpitation. Recommend diet of thin liquids and regular textures with strategies of maintaining upright position and small bolus amounts. SLP to follow for dysarthric speech.   SLP Total Evaluation Time   Eval: oral/pharyngeal swallow function, clinical swallow Minutes (80458) 10   Eval: Sound production with lang comprehension and expression Minutes (72569) 25   SLP Goals                   Interventions       Speech, Language, Voice Communication&/or Auditory Processing               SLP Discharge Planning           SLP Brief overview of current status   Recommend diet of thin liquids and regular textures with strategies of maintaining upright position and small bolus amounts. SLP to follow for dysarthric speech.   SLP Time and Intention   Total Session Time (sum of timed and untimed services) 43

## 2025-03-16 NOTE — PROGRESS NOTES
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 She presented to the ER for evaluation of dizziness and left hand weakness x 4 days.  Brain MRI showing multiple focal hyperacute-acute infarcts.   Existing Precautions/Restrictions fall   Cognitive Status Examination   Orientation Status orientation to person, place and time   Affect/Mental Status (Cognitive) WFL   Follows Commands WFL   Visual Perception   Impact of Vision Impairment on Function (Vision) Reports was seeing \"kaledioscopes\" prior to admission, still some blurry vision noted   Posture   Posture not impaired   Range of Motion Comprehensive   General Range of Motion bilateral upper extremity ROM WNL   Strength Comprehensive (MMT)   General Manual Muscle Testing (MMT) Assessment hand strength deficits identified   Hand Strength   Hand Strength Comments Decreased L  strength, also decreased R d/t previous carpal tunnel   Coordination   Fine Motor Coordination LUE impaired   Bed Mobility   Bed Mobility supine-sit   Supine-Sit [place] (Bed Mobility) supervision   Assistive Device (Bed Mobility) bed " rails   Transfers   Transfers sit-stand transfer;toilet transfer;shower transfer   Sit-Stand Transfer   Sit-Stand [place] (Transfers) contact guard   Shower Transfer   [place] Level (Shower Transfer) unable to assess   Toilet Transfer   [place] Level (Toilet Transfer) contact guard   Balance   Balance Assessment standing dynamic balance   Standing Balance: Dynamic contact guard   Activities of Daily Living   BADL Assessment/Intervention lower body dressing;grooming;toileting   Lower Body Dressing Assessment/Training   [place] Level (Lower Body Dressing) contact guard assist   Grooming Assessment/Training   [place] Level (Grooming) contact guard assist   Toileting   [place] Level (Toileting) contact guard assist   Clinical Impression   Criteria for Skilled Therapeutic Interventions Met (OT) Yes, treatment indicated   OT Diagnosis Decreased ind with ADLs and safety   Influenced by the following impairments Acute ischemic stroke   OT Problem List-Impairments impacting ADL activity tolerance impaired;balance;coordination;strength   Assessment of Occupational Performance 3-5 Performance Deficits   Identified Performance Deficits dressing, toileting, bathing, fxl transfers, coordination   Planned Therapy Interventions (OT) ADL retraining;progressive activity/exercise;risk factor education;transfer training;fine motor coordination training;balance training   Clinical Decision Making Complexity (OT) detailed assessment/moderate complexity   Risk & Benefits of therapy have been explained evaluation/treatment results reviewed;risks/benefits reviewed;care plan/treatment goals reviewed;current/potential barriers reviewed;patient   OT Total Evaluation Time   OT Eval, Moderate Complexity Minutes (09710) 8   Therapy Certification   Medical Diagnosis Acute stroke   Start of Care Date 03/16/25   Certification date from 03/16/25   Certification date to 03/23/25   OT Goals   Therapy Frequency (OT) Daily    OT Predicted Duration/Target Date for Goal Attainment 03/23/25   OT Goals Hygiene/Grooming;Transfers;Toilet Transfer/Toileting;Lower Body Dressing   OT: Hygiene/Grooming independent;while standing   OT: Lower Body Dressing Modified independent   OT: Transfer Supervision/stand-by assist  (tub/transfer)   OT: Toilet Transfer/Toileting Modified independent;toilet transfer;cleaning and garment management   Self-Care/Home Management   Self-Care/Home Mgmt/ADL, Compensatory, Meal Prep Minutes (64421) 10   Symptoms Noted During/After Treatment (Meal Preparation/Planning Training) none   Treatment Detail/Skilled Intervention Eval completed, treatment initiated. Fxl mob in room to bathroom no AD, intermittent reaching for walls for stability, no overt LOB. Toileting completed CGA, min cueing for hand placement. G/h CGA standing at sink. Returned to bedside SBA to return to supine. Left with call light in reach.   OT Discharge Planning   OT Plan LUE coordination/strengthening (hand), tub/shower transfer, toileting, dressing, may need walker for home?   OT Discharge Recommendation (DC Rec) home with assist;home with home care occupational therapy   OT Rationale for DC Rec Pt has supportive SO at home, may require assistive device, recommend home OT eval to enhance ind and safety   OT Brief overview of current status CGA fxl mob, toileting   OT Total Distance Amb During Session (feet) 10   Total Session Time   Timed Code Treatment Minutes 10   Total Session Time (sum of timed and untimed services) 18   [facility]                                                                                   OUTPATIENT OCCUPATIONAL THERAPY    PLAN OF TREATMENT FOR OUTPATIENT REHABILITATION   Patient's Last Name, First Name, Tierney Pederson YOB: 1960   Provider's Name   [facility]   Medical Record No.  [id number]     Onset Date: 03/15/25 Start of Care Date:  03/16/25     Medical Diagnosis:  Acute stroke               OT Diagnosis:  Decreased ind with ADLs and safety Certification Dates:  From: 03/16/25  To: 03/23/25     See note for plan of treatment, functional goals, and certification details.    I CERTIFY THE NEED FOR THESE SERVICES FURNISHED UNDER        THIS PLAN OF TREATMENT AND WHILE UNDER MY CARE (Physician co-signature of this document indicates review and certification of the therapy plan).

## 2025-03-17 ENCOUNTER — APPOINTMENT (OUTPATIENT)
Dept: PHYSICAL THERAPY | Facility: HOSPITAL | Age: 65
End: 2025-03-17
Payer: COMMERCIAL

## 2025-03-17 ENCOUNTER — APPOINTMENT (OUTPATIENT)
Dept: SPEECH THERAPY | Facility: HOSPITAL | Age: 65
End: 2025-03-17
Payer: COMMERCIAL

## 2025-03-17 VITALS
WEIGHT: 149.25 LBS | RESPIRATION RATE: 18 BRPM | DIASTOLIC BLOOD PRESSURE: 88 MMHG | SYSTOLIC BLOOD PRESSURE: 186 MMHG | BODY MASS INDEX: 23.99 KG/M2 | HEIGHT: 66 IN | HEART RATE: 68 BPM | TEMPERATURE: 98.7 F | OXYGEN SATURATION: 98 %

## 2025-03-17 LAB
GLUCOSE BLDC GLUCOMTR-MCNC: 92 MG/DL (ref 70–99)
GLUCOSE BLDC GLUCOMTR-MCNC: 94 MG/DL (ref 70–99)

## 2025-03-17 PROCEDURE — 92507 TX SP LANG VOICE COMM INDIV: CPT | Mod: GN

## 2025-03-17 PROCEDURE — 36415 COLL VENOUS BLD VENIPUNCTURE: CPT | Performed by: PSYCHIATRY & NEUROLOGY

## 2025-03-17 PROCEDURE — 97116 GAIT TRAINING THERAPY: CPT | Mod: GP

## 2025-03-17 PROCEDURE — 250N000013 HC RX MED GY IP 250 OP 250 PS 637: Performed by: PSYCHIATRY & NEUROLOGY

## 2025-03-17 PROCEDURE — 97110 THERAPEUTIC EXERCISES: CPT | Mod: GP

## 2025-03-17 PROCEDURE — 97530 THERAPEUTIC ACTIVITIES: CPT | Mod: GP

## 2025-03-17 PROCEDURE — 99239 HOSP IP/OBS DSCHRG MGMT >30: CPT | Performed by: HOSPITALIST

## 2025-03-17 PROCEDURE — 99221 1ST HOSP IP/OBS SF/LOW 40: CPT | Performed by: PSYCHIATRY & NEUROLOGY

## 2025-03-17 PROCEDURE — 86147 CARDIOLIPIN ANTIBODY EA IG: CPT | Performed by: PSYCHIATRY & NEUROLOGY

## 2025-03-17 PROCEDURE — 250N000013 HC RX MED GY IP 250 OP 250 PS 637: Performed by: HOSPITALIST

## 2025-03-17 PROCEDURE — 250N000013 HC RX MED GY IP 250 OP 250 PS 637: Performed by: STUDENT IN AN ORGANIZED HEALTH CARE EDUCATION/TRAINING PROGRAM

## 2025-03-17 RX ORDER — AMLODIPINE BESYLATE 5 MG/1
5 TABLET ORAL DAILY
Status: DISCONTINUED | OUTPATIENT
Start: 2025-03-17 | End: 2025-03-17 | Stop reason: HOSPADM

## 2025-03-17 RX ORDER — POLYETHYLENE GLYCOL 3350 17 G/17G
17 POWDER, FOR SOLUTION ORAL DAILY
Status: DISCONTINUED | OUTPATIENT
Start: 2025-03-17 | End: 2025-03-17 | Stop reason: HOSPADM

## 2025-03-17 RX ORDER — ASPIRIN 81 MG/1
81 TABLET ORAL DAILY
Qty: 270 TABLET | Refills: 0 | Status: SHIPPED | OUTPATIENT
Start: 2025-06-15

## 2025-03-17 RX ORDER — CLOPIDOGREL BISULFATE 75 MG/1
75 TABLET ORAL DAILY
Qty: 90 TABLET | Refills: 0 | Status: SHIPPED | OUTPATIENT
Start: 2025-03-17 | End: 2025-06-15

## 2025-03-17 RX ORDER — AMLODIPINE BESYLATE 5 MG/1
5 TABLET ORAL DAILY
Qty: 30 TABLET | Refills: 0 | Status: SHIPPED | OUTPATIENT
Start: 2025-03-17

## 2025-03-17 RX ORDER — POLYETHYLENE GLYCOL 3350 17 G/17G
17 POWDER, FOR SOLUTION ORAL DAILY
COMMUNITY
Start: 2025-03-17

## 2025-03-17 RX ORDER — CLOPIDOGREL BISULFATE 75 MG/1
75 TABLET ORAL DAILY
Status: DISCONTINUED | OUTPATIENT
Start: 2025-03-17 | End: 2025-03-17 | Stop reason: HOSPADM

## 2025-03-17 RX ORDER — ATORVASTATIN CALCIUM 40 MG/1
40 TABLET, FILM COATED ORAL EVERY EVENING
Qty: 90 TABLET | Refills: 0 | Status: SHIPPED | OUTPATIENT
Start: 2025-03-17

## 2025-03-17 RX ORDER — ASPIRIN 325 MG
325 TABLET ORAL DAILY
Qty: 90 TABLET | Refills: 0 | Status: SHIPPED | OUTPATIENT
Start: 2025-03-18 | End: 2025-06-16

## 2025-03-17 RX ADMIN — CLOPIDOGREL 75 MG: 75 TABLET ORAL at 12:09

## 2025-03-17 RX ADMIN — AMLODIPINE BESYLATE 5 MG: 5 TABLET ORAL at 12:09

## 2025-03-17 RX ADMIN — ASPIRIN 325 MG ORAL TABLET 325 MG: 325 PILL ORAL at 08:43

## 2025-03-17 RX ADMIN — POLYETHYLENE GLYCOL 3350 17 G: 17 POWDER, FOR SOLUTION ORAL at 12:09

## 2025-03-17 ASSESSMENT — ACTIVITIES OF DAILY LIVING (ADL)
ADLS_ACUITY_SCORE: 35
ADLS_ACUITY_SCORE: 37
ADLS_ACUITY_SCORE: 35
ADLS_ACUITY_SCORE: 37
ADLS_ACUITY_SCORE: 35
ADLS_ACUITY_SCORE: 37
ADLS_ACUITY_SCORE: 37
ADLS_ACUITY_SCORE: 35
ADLS_ACUITY_SCORE: 37
ADLS_ACUITY_SCORE: 35
ADLS_ACUITY_SCORE: 35
ADLS_ACUITY_SCORE: 37

## 2025-03-17 NOTE — PLAN OF CARE
Problem: Adult Inpatient Plan of Care  Goal: Absence of Hospital-Acquired Illness or Injury  Outcome: Progressing  Intervention: Prevent Skin Injury  Recent Flowsheet Documentation  Taken 3/16/2025 2318 by Shelton Recinos RN  Body Position: position changed independently  Taken 3/16/2025 2020 by Shelton Recinos, RN  Body Position: position changed independently     Problem: Stroke, Ischemic (Includes Transient Ischemic Attack)  Goal: Optimal Coping  Outcome: Progressing     Problem: Stroke, Ischemic (Includes Transient Ischemic Attack)  Goal: Optimal Cognitive Function  Outcome: Progressing   Goal Outcome Evaluation:  VSS on RA, A&Ox4, denies pain. No new neuro deficits, L facial droop and slurring of speech. Tele sinus homer. , 115, 94. Reg diet, SBA. Will continue with plan of care.

## 2025-03-17 NOTE — PLAN OF CARE
Occupational Therapy Discharge Summary    Reason for therapy discharge:    Discharged to home with home therapy.    Progress towards therapy goal(s). See goals on Care Plan in [facility] electronic health record for goal details.  Goals partially met.  Barriers to achieving goals:   discharge from facility.    Therapy recommendation(s):    Continued therapy is recommended.  Rationale/Recommendations:  recommend home care OT services.    Dagmar Mckeon OTR/L 3/17/25

## 2025-03-17 NOTE — CONSULTS
[facility] Neurology  [place]    Tierney Pearson MRN# [id number]   Age: 64 year old YOB: 1960               Assessment and Plan:      Multiple small strokes  The right pontine stroke is causing some left-sided deficits, improving     I showed lower the MRI images, pointing out the pontine stroke is the likely cause of her symptoms.  Clinically she is doing well, already seeing some improvement in terms of neurologic function.  She is a very good rehab candidate and hopefully will recover well from this stroke.    Chest abdomen pelvis CT scan shows no evidence of underlying malignancy to cause a hypercoagulable state.  Echocardiogram is fine, no A-fib on telemetry so far  Will check cardiolipin antibodies  Should do Zio patch monitor after discharge (unless A-fib shows up prior to discharge)    I again counseled her that quitting smoking altogether will help prevent further strokes.   LDL is 86, a atorvastatin added appropriately  CTA does show some moderate intracranial stenosis, will do aspirin plus Plavix for 90 days, then aspirin 81 mg daily indefinitely    CTA also shows incidentally a 2.5 mm possible aneurysm.  I would recommend follow-up CTA (or MRA) 6 months from now.  She could follow-up with the neurology clinic for that.    Therapies are recommending TCU for further therapy.  Otherwise no new recommendations from me, I will sign off for now.  Please call if questions.          Chief Complaint/HPI:     This patient is a 64-year-old woman admitted to [facility] with stroke.  She came in with a 4-day history of dizziness and left-sided weakness.  MRI revealed several very small strokes, mainly in the left hemisphere, a tiny 1 on the right but also 1 in the [place] which I suspect is the symptomatic stroke.  She is already noticing significant improvement in the discoordination on the left side.  She has been walking a bit better today compared to yesterday.  She does still smoke 2  cigarettes/day, she has cut back from being a heavy smoker in the past.            Past Medical History:    has a past medical history of Hypertension.          Past Surgical History:    has a past surgical history that includes no history of surgery.          Social History:     Social History     Tobacco Use    Smoking status: Former     Current packs/day: 0.00     Average packs/day: 0.2 packs/day for 30.0 years (6.0 ttl pk-yrs)     Types: Cigarettes     Start date: 1969     Quit date: 1999     Years since quittin.2    Smokeless tobacco: Never   Substance Use Topics    Alcohol use: Yes     Comment: Occasional on the weekends - 12 pack             Family History:     Family History   Problem Relation Age of Onset    Breast Cancer Mother     Heart Disease Father         issues    Breast Cancer Sister                 Allergies:     Allergies   Allergen Reactions    Ciprofloxacin      Rash and hives.    Influenza Virus Vaccine Other (See Comments)     Broke out in hives    Seasonal Allergies     Polymyxin B Hives and Rash             Medications:     Current Facility-Administered Medications:     acetaminophen (TYLENOL) tablet 975 mg, 975 mg, Oral, TID PRN, Robby Rowan MD, 975 mg at 25 1118    aspirin (ASA) tablet 325 mg, 325 mg, Oral, Daily, Robby Rowan MD, 325 mg at 25 0843    atorvastatin (LIPITOR) tablet 40 mg, 40 mg, Oral, QPM, Blanche Suarez MD, 40 mg at 25    hydrALAZINE (APRESOLINE) injection 10 mg, 10 mg, Intravenous, Q6H PRN, Clementine Yu PA-C    lidocaine (LMX4) cream, , Topical, Q1H PRN, Robby Roawn MD    lidocaine 1 % 0.1-1 mL, 0.1-1 mL, Other, Q1H PRN, Robby Rowan MD    loratadine (CLARITIN) tablet 10 mg, 10 mg, Oral, Every Other Day, Robby Rowan MD, 10 mg at 25 0825    ondansetron (ZOFRAN ODT) ODT tab 4 mg, 4 mg, Oral, Q6H PRN **OR** ondansetron (ZOFRAN) injection 4 mg, 4 mg, Intravenous, Q6H PRN, Robby Rowan MD     "prochlorperazine (COMPAZINE) injection 10 mg, 10 mg, Intravenous, Q6H PRN **OR** prochlorperazine (COMPAZINE) tablet 10 mg, 10 mg, Oral, Q6H PRN, Robby Rowan MD    sodium chloride (PF) 0.9% PF flush 3 mL, 3 mL, Intracatheter, Q8H, Robby Rowan MD, 3 mL at 03/17/25 0843    sodium chloride (PF) 0.9% PF flush 3 mL, 3 mL, Intracatheter, q1 min prn, Robby Rowan MD              Physical Exam:      Vitals: BP (!) 171/81 (BP Location: Left arm)   Pulse 66   Temp 98  F (36.7  C) (Oral)   Resp 16   Ht 1.676 m (5' 6\")   Wt 67.7 kg (149 lb 4 oz)   SpO2 94%   BMI 24.09 kg/m    BMI= Body mass index is 24.09 kg/m .     Patient is alert and in no acute distress. Neck was supple, no carotid bruits, thyromegaly, lymphadenopathy or JVD noted.   Neurological Exam:   Mental status: Patient is alert and oriented x 3. Speech is clear and fluent    CN II: Visual fields are full to confrontation.  PERRLA.   CN III, IV, VI: EOMI.    CN VII: Face is symmetric with normal eye closure and smile.   CN VII: Hearing is normal to conversation   CN IX, X: Palate elevates symmetrically. Phonation is normal.    CN XI: Head turning and shoulder shrug are intact   CN XII: Tongue is midline with normal movements and no atrophy or fasciculations.   Motor: Muscle bulk and tone are normal. No pronator drift. Strength is 5/5 bilaterally. No fasciculations noted.   Reflexes: Reflexes are 1+ in right arm, 2+ in left arm, absent in legs   Sensory: Light touch, pinprick, sense are intact bilaterally.    Coordination: Rapid alternating movements and fine finger movements are just mildly slowed on left compared to right  Hand writing is a little bit less than baseline but still legible, she is left-handed   Gait: gait is still a bit unsteady        Joseph Borrero MD       More than 45 minutes spent reviewing records and results, evaluating patient, discussing with pt and family and on documentation    "

## 2025-03-17 NOTE — PLAN OF CARE
Problem: Stroke, Ischemic (Includes Transient Ischemic Attack)  Goal: Optimal Coping  Outcome: Progressing  Goal: Optimal Cognitive Function  Outcome: Progressing   Goal Outcome Evaluation:    Patient is AAO. PRN Tylenol administered for headache with positive results. Scoring 1-2 on NIH for Left facial droop and dysarthria. Sinus homer to normal sinus on tele. Echo completed today. BP did not require any PRN medications this shift. SCDs in place while patient is in bed. Patient become tearful after social work came in to give her recommendations for TCU. Patient reports being fearful of her current situation regarding her new stroke. Patient is a standby assist of one with transfers. Uses call light appropriately.

## 2025-03-17 NOTE — PROGRESS NOTES
Care Management Follow Up    Length of Stay (days): 1    Expected Discharge Date: 03/18/2025    Anticipated Discharge Plan:       Transportation: Anticipate Family/friend    PT Recommendations: Transitional Care Facility, Per plan established by the PT, other (see comments)  OT Recommendations:  home with assist, home with home care occupational therapy     Barriers to Discharge: medical stability    Prior Living Situation: house (one level plus basement, 3 steps to enter) with significant other    Discussed  Partnership in Safe Discharge Planning  document with patient/family: No     Handoff Completed: No, handoff not indicated or clinically appropriate    Patient/Spokesperson Updated: Yes. Who? pt    Additional Information:  CM met with pt in room to discuss discharge planning. Therapy rec is TCU. Pt declines TCU but is agreeable to home care. She does not have agency preference.    Home RN, PT, OT, SLP referral sent and pending.     3:44 PM  Pt accepted by [facility].     Tiffany Fonseca LGSW

## 2025-03-17 NOTE — DISCHARGE SUMMARY
[facility] MEDICINE  DISCHARGE SUMMARY     Primary Care Physician: Lauren Weldon  Admission Date: 3/15/2025   Discharge Provider: Blanche Suarez MD Discharge Date: 3/17/2025   Diet:   Active Diet and Nourishment Order   Procedures    Regular Diet Adult    Diet       Code Status: No CPR- Do NOT Intubate   Activity: DCACTIVITY: Activity as tolerated        Condition at Discharge: Good     REASON FOR PRESENTATION(See Admission Note for Details)   Left-sided weakness    PRINCIPAL & ACTIVE DISCHARGE DIAGNOSES     Active Problems:    HTN (hypertension)    Dizziness    Acute ischemic stroke (H)    Left-sided weakness      PENDING LABS     Unresulted Labs Ordered in the Past 30 Days of this Admission       Date and Time Order Name Status Description    3/17/2025  9:34 AM Cardiolipin Panel IgA IgG and IgM In process             PROCEDURES ( this hospitalization only)      none    RECOMMENDATIONS TO OUTPATIENT PROVIDER FOR F/U VISIT     Follow-up Appointments       Hospital Follow-up with Existing Primary Care Provider (PCP)      Please see details below         Schedule Primary Care visit within: 7 Days               DISPOSITION     Home with home care    SUMMARY OF HOSPITAL COURSE:      Tierney Pearson is a 64 year old female with hypertension, nicotine dependence, pSVT presented for evaluation of dizziness and left hand weakness x 4 days.  Brain MRI showed multiple focal hyperacute-acute infarcts. Hospital Day: 2        CVA with Multifocal Infarcts  Presented to the ER for evaluation of 4 days of left-sided weakness and dizziness  -MRI showed multifocal strokes in the bilateral cerebral hemispheres and right hemipons, significant burden of small vessel ischemic disease.  The pontine stroke is felt to be the cause of her weakness.  -- MRI brain with multiple foci of diffusion restriction within the cerebral hemispheres and right david-aleksander are favored to represent late hyperacute to acute  infarctions.  Advanced small vessel ischemic disease  -Seen by neurology, recommending dual antiplatelet therapy with aspirin and Plavix for 90 days, followed by baby aspirin indefinitely  -- Statin: LDL 83, start atorvastatin 40mg  -Still smokes 2 cigarettes/day, cessation strongly advised  -Distribution concerning for embolic origin however no cause of embolic phenomenon found.  No atrial fibrillation seen on telemetry during hospital stay, plan 2-week Zio patch after discharge. Echo wnl, body CT no sign of occult malignancy, but she should get up to date with age-appropriate cancer screening in case there is anything subtle not visible on gross imaging (sounds like she is due for both mammogram and colonoscopy)  -Deficits have steadily improved.  Initially plan was TCU but has improved enough to go home with home care.  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  -Neurology recommends follow-up CTA or MRA in 6 months    Hypertension  -Presented after 4 days of symptoms, so out of acute phase for stroke however blood pressure remained elevated throughout hospital stay.  Previously on amlodipine but she stopped this at some point.  We will resume amlodipine at low-dose 5 mg daily, follow-up soon with PCP     Constipation  -MiraLAX    Incidental findings on imaging - advanced dental disease. Outpatient follow-up        Discharge Medications with Med changes:     Current Discharge Medication List        START taking these medications    Details   amLODIPine (NORVASC) 5 MG tablet Take 1 tablet (5 mg) by mouth daily.  Qty: 30 tablet, Refills: 0    Associated Diagnoses: Acute ischemic stroke (H)      aspirin (ASA) 325 MG tablet Take 1 tablet (325 mg) by mouth daily. For 90 days then stop.  Qty: 90 tablet, Refills: 0    Associated Diagnoses:  Acute ischemic stroke (H)      aspirin 81 MG EC tablet Take 1 tablet (81 mg) by mouth daily. Start once you finish the supply of aspirin 325mg and clopidogrel.  Qty: 270 tablet, Refills: 0    Associated Diagnoses: Acute ischemic stroke (H)      atorvastatin (LIPITOR) 40 MG tablet Take 1 tablet (40 mg) by mouth every evening.  Qty: 90 tablet, Refills: 0    Associated Diagnoses: Acute ischemic stroke (H)      clopidogrel (PLAVIX) 75 MG tablet Take 1 tablet (75 mg) by mouth daily. For 90 days then stop.  Qty: 90 tablet, Refills: 0    Associated Diagnoses: Acute ischemic stroke (H)      polyethylene glycol (MIRALAX) 17 GM/Dose powder Take 17 g by mouth daily.    Associated Diagnoses: Acute ischemic stroke (H)           CONTINUE these medications which have NOT CHANGED    Details   loratadine (CLARITIN) 10 MG tablet Take 10 mg by mouth every other day.               Consults     NEUROLOGY IP STROKE CONSULT  SPEECH LANGUAGE PATH ADULT IP CONSULT  PHARMACY IP CONSULT  PHARMACY IP CONSULT  PHARMACY IP CONSULT  PHYSICAL THERAPY ADULT IP CONSULT  OCCUPATIONAL THERAPY ADULT IP CONSULT  REHAB ADMISSIONS LIAISON IP CONSULT  CARE MANAGEMENT / SOCIAL WORK IP CONSULT  NEUROLOGY IP CONSULT  SMOKING CESSATION PROGRAM IP CONSULT        SIGNIFICANT IMAGING FINDINGS     Results for orders placed or performed during the hospital encounter of 03/15/25   CTA Head Neck with Contrast    Impression    CONCLUSION:  HEAD CT:  1.  No hemorrhage.  2.  There are numerous lacunar infarcts within the bilateral basal ganglia that appear to have been largely present previously. MRI would be more sensitive for recent infarct in this patient.  3.  Advanced dental disease.    HEAD CTA:  1.  No large vessel occlusion.  2.  Mild intracranial atherosclerosis.  3.  2.5 mm laterally directed outpouching arising from the distal right basilar artery could represent a small aneurysm or residual opacification of a stenotic or partially occluded right P1 segment.  An aneurysm is favored.    NECK CTA:  1.  Mild carotid artery atherosclerotic disease. No hemodynamically significant narrowing in either carotid artery by NASCET criteria.  2.  Moderate atherosclerotic stenosis of the dominant right vertebral artery origin.     MR Brain w/o & w Contrast    Impression    IMPRESSION:    1.  Multiple foci of diffusion restriction within the cerebral hemispheres and right david-aleksander are favored to represent late hyperacute to acute infarctions.  2.  Advanced small vessel ischemic disease within the cerebral white matter.    These findings were communicated over the phone to Dr. Agee at 3:30 PM on 03/15/2025 by Erick Amaya.   CT Chest/Abdomen/Pelvis w Contrast    Impression    IMPRESSION:  1.  Minor dependent atelectasis in the posterior lungs. Both lungs are otherwise clear. No adenopathy or pleural effusion on either side.    2.  Normal cardiac size. Dense coronary artery calcifications. Aortic valvular calcifications. No pericardial effusion. Atherosclerotic aorta in the chest, abdomen and pelvis, ectatic ascending segment, more apparent on current study. No aneurysm or   dissection.    3.  Cholelithiasis without biliary dilatation or adjacent inflammation. Diffusely fatty infiltrated liver. Hepatosplenomegaly without discrete lesion. No abdominopelvic adenopathy or ascites.    4.  Formed stool material within normal caliber colon, without mechanical obstruction, free gas or free fluid. Normal appendix.       Echocardiogram Complete - For age > 60 yrs   Result Value Ref Range    Biplane LVEF 66%        SIGNIFICANT LABORATORY FINDINGS     See emr    Discharge Orders        Primary Care - Care Coordination Referral      Adult Neurology  Referral      Home Care Referral      Reason for your hospital stay    stroke     Activity    Your activity upon discharge: activity as tolerated     When to contact your care team    Call your primary doctor if you have any of the following:  chest pain, shortness of breath, fever, chills, fainting, dizziness, vomiting, constipation, dehydration, worsening pain, bleeding, worsening stroke symptoms, or trouble urinating, or any other symptoms that are new or concerning to you.     Diet    Follow this diet upon discharge: resume your regular diet     Stroke Hospital Follow Up    Please be aware that coverage of these services is subject to the terms and limitations of your health insurance plan.  Call member services at your health plan with any benefit or coverage questions.  [facility] will call you to coordinate care as prescribed by your provider. If you don t hear from a representative within 2 business days, please call [phone].       Hospital Follow-up with Existing Primary Care Provider (PCP)    Please see details below          ZIO PATCH MAIL OUT       Examination   Physical Exam   Temp:  [97.3  F (36.3  C)-98.5  F (36.9  C)] 98  F (36.7  C)  Pulse:  [66-72] 66  Resp:  [16-18] 16  BP: (143-182)/(75-89) 171/81  SpO2:  [94 %-97 %] 94 %  Wt Readings from Last 1 Encounters:   03/16/25 67.7 kg (149 lb 4 oz)       General: in no apparent distress, non-toxic, and alert female lying in hospital bed oriented x3  HEENT: Head normocephalic atraumatic, oral mucosa moist. Sclerae anicteric. Poor dentition  Skin: No rashes or lesions  Extremities: No peripheral edema  Psych: Normal affect, mood euthymic  Neuro: facial droop appears resolved.  Speech no longer dysarthric.       Please see EMR for more detailed significant labs, imaging, consultant notes etc.    IBlanche MD, personally saw the patient today and spent greater than 30 minutes discharging this patient.    Blanche Suarez MD  [facility]    CC:Lauren Weldon

## 2025-03-17 NOTE — PLAN OF CARE
Physical Therapy Discharge Summary    Reason for therapy discharge:    Discharged to home with home therapy.    Progress towards therapy goal(s). [facility] for goal details.  Goals partially met.  Barriers to achieving goals:   discharge from facility.    Therapy recommendation(s):    Recommend continued therapies to improve overall strength, balance and mobility.       Dagmar Haas, PT, DPT  3/17/2025

## 2025-03-17 NOTE — PLAN OF CARE
Speech Language Therapy Discharge Summary    Reason for therapy discharge:    Discharged to home with home care later today.     Progress towards therapy goal(s). See goals on Care Plan in [facility] electronic health record for goal details.  Goals partially met.  Barriers to achieving goals:   discharge from facility.    Therapy recommendation(s):    Continued therapy is recommended.  Rationale/Recommendations:  dysarthria.

## 2025-03-17 NOTE — PLAN OF CARE
Goal Outcome Evaluation:    Alert and oriented. Ready to discharge to home with home care today.  Iv removed. Telemetry removed.  Review papers with pt. Answer all questions.  Will follow up with neurology. And will have a cardiac event monitor mailed to her. Meds sent to her pharmacy for .

## 2025-03-18 ENCOUNTER — PATIENT OUTREACH (OUTPATIENT)
Dept: CARE COORDINATION | Facility: CLINIC | Age: 65
End: 2025-03-18
Payer: COMMERCIAL

## 2025-03-18 LAB
CARDIOLIPIN IGA SER IA-ACNC: 4.5 APL-U/ML
CARDIOLIPIN IGA SER IA-ACNC: NEGATIVE
CARDIOLIPIN IGG SER IA-ACNC: <2 GPL-U/ML
CARDIOLIPIN IGG SER IA-ACNC: NEGATIVE
CARDIOLIPIN IGM SER IA-ACNC: 2 MPL-U/ML
CARDIOLIPIN IGM SER IA-ACNC: NEGATIVE

## 2025-03-18 NOTE — PROGRESS NOTES
Clinic Care Coordination Contact  [facility]/Voicemail    Clinical Data: Care Coordinator Outreach    Outreach Documentation Number of Outreach Attempt   3/18/2025  10:37 AM 1       Left message on patient's voicemail with call back information and requested return call.      Plan: Care Coordinator will try to reach patient again in 1-2 business days.    TCM Episode created  [facility] x 1

## 2025-03-18 NOTE — PROGRESS NOTES
Clinic Care Coordination Contact  [facility]/Voicemail    Clinical Data: Care Coordinator Outreach    Outreach Documentation Number of Outreach Attempt   3/18/2025  10:37 AM 1   3/18/2025   1:56 PM 2       Left message on patient's voicemail with call back information and requested return call.      Plan: Care Coordinator will send unable to contact letter with care coordinator contact information via mail. Care Coordinator will do no further outreaches at this time.    TCM Episode created  UTC x 2

## 2025-03-19 LAB
ATRIAL RATE - MUSE: 70 BPM
DIASTOLIC BLOOD PRESSURE - MUSE: 87 MMHG
INTERPRETATION ECG - MUSE: NORMAL
P AXIS - MUSE: 78 DEGREES
PR INTERVAL - MUSE: 148 MS
QRS DURATION - MUSE: 76 MS
QT - MUSE: 402 MS
QTC - MUSE: 434 MS
R AXIS - MUSE: 69 DEGREES
SYSTOLIC BLOOD PRESSURE - MUSE: 188 MMHG
T AXIS - MUSE: 83 DEGREES
VENTRICULAR RATE- MUSE: 70 BPM

## 2025-03-26 ENCOUNTER — OFFICE VISIT (OUTPATIENT)
Dept: FAMILY MEDICINE | Facility: CLINIC | Age: 65
End: 2025-03-26
Payer: COMMERCIAL

## 2025-03-26 ENCOUNTER — TELEPHONE (OUTPATIENT)
Dept: FAMILY MEDICINE | Facility: CLINIC | Age: 65
End: 2025-03-26

## 2025-03-26 VITALS
DIASTOLIC BLOOD PRESSURE: 82 MMHG | SYSTOLIC BLOOD PRESSURE: 132 MMHG | WEIGHT: 156.5 LBS | HEIGHT: 66 IN | OXYGEN SATURATION: 98 % | RESPIRATION RATE: 17 BRPM | HEART RATE: 76 BPM | BODY MASS INDEX: 25.15 KG/M2 | TEMPERATURE: 98.5 F

## 2025-03-26 DIAGNOSIS — R29.898 LEFT HAND WEAKNESS: ICD-10-CM

## 2025-03-26 DIAGNOSIS — F17.200 TOBACCO USE DISORDER: ICD-10-CM

## 2025-03-26 DIAGNOSIS — R29.898 LEFT LEG WEAKNESS: ICD-10-CM

## 2025-03-26 DIAGNOSIS — I63.9 ACUTE ISCHEMIC STROKE (H): Primary | ICD-10-CM

## 2025-03-26 DIAGNOSIS — I47.29 PAROXYSMAL VENTRICULAR TACHYCARDIA (H): ICD-10-CM

## 2025-03-26 DIAGNOSIS — K59.00 CONSTIPATION, UNSPECIFIED CONSTIPATION TYPE: ICD-10-CM

## 2025-03-26 DIAGNOSIS — I10 PRIMARY HYPERTENSION: ICD-10-CM

## 2025-03-26 DIAGNOSIS — Z12.11 SCREEN FOR COLON CANCER: ICD-10-CM

## 2025-03-26 DIAGNOSIS — H53.9 VISION CHANGES: ICD-10-CM

## 2025-03-26 DIAGNOSIS — R42 DIZZINESS: ICD-10-CM

## 2025-03-26 PROCEDURE — 99215 OFFICE O/P EST HI 40 MIN: CPT | Performed by: PHYSICIAN ASSISTANT

## 2025-03-26 PROCEDURE — 1111F DSCHRG MED/CURRENT MED MERGE: CPT | Performed by: PHYSICIAN ASSISTANT

## 2025-03-26 PROCEDURE — 3075F SYST BP GE 130 - 139MM HG: CPT | Performed by: PHYSICIAN ASSISTANT

## 2025-03-26 PROCEDURE — 99417 PROLNG OP E/M EACH 15 MIN: CPT | Performed by: PHYSICIAN ASSISTANT

## 2025-03-26 PROCEDURE — 3079F DIAST BP 80-89 MM HG: CPT | Performed by: PHYSICIAN ASSISTANT

## 2025-03-26 RX ORDER — POLYETHYLENE GLYCOL 3350 17 G/17G
17 POWDER, FOR SOLUTION ORAL DAILY
Qty: 850 G | Refills: 1 | Status: SHIPPED | OUTPATIENT
Start: 2025-03-26

## 2025-03-26 NOTE — TELEPHONE ENCOUNTER
Call placed to patient  No answer; voicemail left requesting call back to Clinic RN    Jose E Mcgregor, Clinic RN  [facility]

## 2025-03-26 NOTE — TELEPHONE ENCOUNTER
Pt returned call & was read providers message.  Pt verbalized understanding. Pt was given phone numbers for OT, eye referrals & also for echocardiogram & she will call & schedule appt's. Noted pt also had a PT referral placed today & states she does have the referral number on her AVS.    Rere Gonzalez RN

## 2025-03-26 NOTE — LETTER
March 26, 2025      Tierney Pearson  [address] [place] [address]        To Whom It May Concern:    Tierney Pearson was seen in our clinic 3/26/25. She will miss work due to symptoms from recent stroke.  She may return to work 4/9/25 with the following: limited to light duty - recommend frequent breaks of sitting while walking or standing.       Sincerely,      Lauren Weldon      Electronically signed

## 2025-03-26 NOTE — PROGRESS NOTES
"  Assessment & Plan     Acute ischemic stroke (H)  Reviewed hospitalization records. Symptoms are slowly improving however still with significant weaknesses. Referrals placed. Letter written for work.  Discussed medication usage, risks/monitoring with blood thinners.  Neurology appointment scheduled in June.  Recommended echocardiogram with bubble study.    - Physical Therapy  Referral; Future  - Occupational Therapy  Referral; Future  - Adult Eye  Referral; Future  - Occupational Therapy  Referral; Future  - Echocardiogram Complete; Future    Primary hypertension  Improved on second readings. Has restarted amlodipine 5 mg. Will continue and monitor closely. Recommended home blood pressure cuff.  - Home Blood Pressure Monitor Order for DME - ONLY FOR DME    Left hand weakness  Continued weakness, recommended hand therapy.  - Occupational Therapy  Referral; Future    Left leg weakness  Continued weakness, doing exercises at home. Recommended physical therapy.   - Physical Therapy  Referral; Future    Vision changes  Continued vague vision changes which patient has some difficulty describing - difficulty seeing as easily, sometimes \"goes in and out\" in left eye, blurred vision. These symptoms were present and worse while in the hospital and have improved somewhat. Recommended ophtho referral and vision therapy.  - Adult Eye  Referral; Future  - Occupational Therapy  Referral; Future    Dizziness  Improved/resolved.    Constipation, unspecified constipation type  Treating with mineral oil. Has had scant BRBPR with constipation. Concern with blood thinner. Handout given, recommended miralax/metamucil for more treatment/prevention of constipation.  - polyethylene glycol (MIRALAX) 17 GM/Dose powder; Take 17 g by mouth daily.    Paroxysmal ventricular tachycardia (H)  Stable/resolved. Evaluated by cardiology in 2022. No current symptoms. Currently " wearing zio patch for evaluation of stroke.    Tobacco use disorder  Has fully quit smoking, congratulated patient.    Screen for colon cancer  Briefly discussed cancer screenings. Agree with hospital discharge that patient needs these screenings. Patient scheduled for preventive with me in April and will discuss more at this time.      MED REC REQUIRED  Post Medication Reconciliation Status: discharge medications reconciled, continue medications without change    61 minutes spent by me on the date of the encounter doing chart review, history and exam, documentation and further activities per the note     Mau Monet is a 64 year old, presenting for the following health issues:  ER F/U        3/26/2025    10:23 AM   Additional Questions   Roomed by Dulce   Accompanied by Self     HPI        Hospital Follow-up Visit:    Hospital/Nursing Home/IP Rehab Facility: [facility]  Most Recent Admission Date: 3/15/2025   Most Recent Admission Diagnosis: Dizziness - R42  Acute ischemic stroke (H) - I63.9  Left-sided weakness - R53.1     Most Recent Discharge Date: 3/17/2025   Most Recent Discharge Diagnosis: Dizziness - R42  Left-sided weakness - R53.1  Acute ischemic stroke (H) - I63.9   Was the patient in the ICU or did the patient experience delirium during hospitalization?  No  Do you have any other stressors you would like to discuss with your provider? No    Problems taking medications regularly:  None  Medication changes since discharge: None  Problems adhering to non-medication therapy:  None    Summary of hospitalization:  [facility] discharge summary reviewed  Diagnostic Tests/Treatments reviewed.  Follow up needed: none  Other Healthcare Providers Involved in Patient s Care:         Specialist appointment - neurology appointment in June  Update since discharge: improved.     Plan of care communicated with patient           -MRI showed multifocal strokes in the  "bilateral cerebral hemispheres and right hemipons, significant burden of small vessel ischemic disease.  The pontine stroke is felt to be the cause of her weakness.  -- MRI brain with multiple foci of diffusion restriction within the cerebral hemispheres and right david-aleksander are favored to represent late hyperacute to acute infarctions.  Advanced small vessel ischemic disease  -Seen by neurology, recommending dual antiplatelet therapy with aspirin and Plavix for 90 days, followed by baby aspirin indefinitely    From discharge:  \"-Distribution concerning for embolic origin however no cause of embolic phenomenon found.  No atrial fibrillation seen on telemetry during hospital stay, plan 2-week Zio patch after discharge. Echo wnl, body CT no sign of occult malignancy, but she should get up to date with age-appropriate cancer screening in case there is anything subtle not visible on gross imaging (sounds like she is due for both mammogram and colonoscopy) \"          Review of Systems  Constitutional, HEENT, cardiovascular, pulmonary, GI, , musculoskeletal, neuro, skin, endocrine and psych systems are negative, except as otherwise noted.      Objective    /82   Pulse 76   Temp 98.5  F (36.9  C) (Tympanic)   Resp 17   Ht 1.676 m (5' 5.98\")   Wt 71 kg (156 lb 8 oz)   SpO2 98%   BMI 25.27 kg/m    Body mass index is 25.27 kg/m .  Physical Exam   GENERAL: alert and no distress  EYES: Eyes grossly normal to inspection, PERRL and conjunctivae and sclerae normal  Normal peripheral vision testing  NECK: no adenopathy, no asymmetry, masses, or scars  RESP: lungs clear to auscultation - no rales, rhonchi or wheezes  CV: regular rate and rhythm, normal S1 S2, no S3 or S4, no murmur, click or rub, no peripheral edema  MS: no gross musculoskeletal defects noted, no edema  Continued weakness on muscle testing of left hand  strength and left leg strength. Able to walk slowly in room using cane.          Signed " Electronically by: Lauren Weldon PA-C

## 2025-03-26 NOTE — TELEPHONE ENCOUNTER
Please tell patient I did speak with colleagues regarding vision change recommendations.  With her continued vision issues I do recommend she is evaluated further:  -  I placed ophthalmology referral, I recommend she see eye doctor rather than , in the next couple weeks.  - I also placed referral for vision therapy through OT referral.  Both will call her to schedule.    I also reviewed the workup from the hospital more closely, would recommend an echocardiogram specifically with bubble study (to look for abnormal blood flow through the heart).   Order placed.    Taylor Weldon PA-C

## 2025-04-28 ENCOUNTER — OFFICE VISIT (OUTPATIENT)
Dept: FAMILY MEDICINE | Facility: CLINIC | Age: 65
End: 2025-04-28
Payer: COMMERCIAL

## 2025-04-28 VITALS
OXYGEN SATURATION: 98 % | BODY MASS INDEX: 24.85 KG/M2 | TEMPERATURE: 98.6 F | WEIGHT: 154.6 LBS | RESPIRATION RATE: 16 BRPM | SYSTOLIC BLOOD PRESSURE: 120 MMHG | HEART RATE: 82 BPM | DIASTOLIC BLOOD PRESSURE: 82 MMHG | HEIGHT: 66 IN

## 2025-04-28 DIAGNOSIS — I63.9 ACUTE ISCHEMIC STROKE (H): ICD-10-CM

## 2025-04-28 DIAGNOSIS — R53.1 LEFT-SIDED WEAKNESS: ICD-10-CM

## 2025-04-28 DIAGNOSIS — H35.30 MACULAR DEGENERATION (SENILE) OF RETINA: ICD-10-CM

## 2025-04-28 DIAGNOSIS — R41.3 MEMORY LOSS: ICD-10-CM

## 2025-04-28 DIAGNOSIS — Z12.31 VISIT FOR SCREENING MAMMOGRAM: ICD-10-CM

## 2025-04-28 DIAGNOSIS — Z12.11 SCREEN FOR COLON CANCER: ICD-10-CM

## 2025-04-28 DIAGNOSIS — I10 PRIMARY HYPERTENSION: ICD-10-CM

## 2025-04-28 DIAGNOSIS — Z12.4 CERVICAL CANCER SCREENING: ICD-10-CM

## 2025-04-28 DIAGNOSIS — Z00.00 ROUTINE GENERAL MEDICAL EXAMINATION AT A HEALTH CARE FACILITY: Primary | ICD-10-CM

## 2025-04-28 LAB
ANION GAP SERPL CALCULATED.3IONS-SCNC: 10 MMOL/L (ref 7–15)
BUN SERPL-MCNC: 4.3 MG/DL (ref 8–23)
CALCIUM SERPL-MCNC: 9.2 MG/DL (ref 8.8–10.4)
CHLORIDE SERPL-SCNC: 100 MMOL/L (ref 98–107)
CREAT SERPL-MCNC: 0.49 MG/DL (ref 0.51–0.95)
EGFRCR SERPLBLD CKD-EPI 2021: >90 ML/MIN/1.73M2
GLUCOSE SERPL-MCNC: 84 MG/DL (ref 70–99)
HCO3 SERPL-SCNC: 24 MMOL/L (ref 22–29)
POTASSIUM SERPL-SCNC: 4 MMOL/L (ref 3.4–5.3)
SODIUM SERPL-SCNC: 134 MMOL/L (ref 135–145)

## 2025-04-28 PROCEDURE — 80048 BASIC METABOLIC PNL TOTAL CA: CPT | Performed by: PHYSICIAN ASSISTANT

## 2025-04-28 PROCEDURE — 36415 COLL VENOUS BLD VENIPUNCTURE: CPT | Performed by: PHYSICIAN ASSISTANT

## 2025-04-28 RX ORDER — AMLODIPINE BESYLATE 5 MG/1
5 TABLET ORAL DAILY
Qty: 90 TABLET | Refills: 2 | Status: SHIPPED | OUTPATIENT
Start: 2025-04-28

## 2025-04-28 RX ORDER — ATORVASTATIN CALCIUM 40 MG/1
40 TABLET, FILM COATED ORAL EVERY EVENING
Qty: 90 TABLET | Refills: 2 | Status: SHIPPED | OUTPATIENT
Start: 2025-04-28

## 2025-04-28 SDOH — HEALTH STABILITY: PHYSICAL HEALTH: ON AVERAGE, HOW MANY MINUTES DO YOU ENGAGE IN EXERCISE AT THIS LEVEL?: 30 MIN

## 2025-04-28 SDOH — HEALTH STABILITY: PHYSICAL HEALTH: ON AVERAGE, HOW MANY DAYS PER WEEK DO YOU ENGAGE IN MODERATE TO STRENUOUS EXERCISE (LIKE A BRISK WALK)?: 6 DAYS

## 2025-04-28 ASSESSMENT — SOCIAL DETERMINANTS OF HEALTH (SDOH): HOW OFTEN DO YOU GET TOGETHER WITH FRIENDS OR RELATIVES?: MORE THAN THREE TIMES A WEEK

## 2025-04-28 NOTE — PROGRESS NOTES
Preventive Care Visit  [facility] LARS Weldon PA-C, Family Medicine  Apr 28, 2025  {Provider  Link to SmartSet :854454}    Assessment & Plan     {Diag Picklist:456194}      Counseling  Appropriate preventive services were addressed with this patient via screening, questionnaire, or discussion as appropriate for fall prevention, nutrition, physical activity, Tobacco-use cessation, social engagement, weight loss and cognition.  Checklist reviewing preventive services available has been given to the patient.  Reviewed patient's diet, addressing concerns and/or questions.   She is at risk for psychosocial distress and has been provided with information to reduce risk.   The patient reports drinking more than 3 alcoholic drinks per day and/or more than 7 drhnks per week. The patient was counseled and given information about possible harmful effects of excessive alcohol intake.    Mau Monet is a 64 year old, presenting for the following:  Physical        4/28/2025     1:28 PM   Additional Questions   Roomed by Dulce   Accompanied by Self         HPI           Advance Care Planning  {The storyboard will display whether the patient has ACP docs on file. Hover over the Code section in the storyboard to access the ACP documents. :[id number]}  Discussed advance care planning with patient; informed AVS has link to [facility].        4/28/2025   General Health   How would you rate your overall physical health? (!) FAIR   Feel stress (tense, anxious, or unable to sleep) To some extent   (!) STRESS CONCERN      4/28/2025   Nutrition   Three or more servings of calcium each day? (!) I DON'T KNOW   Diet: Regular (no restrictions)   How many servings of fruit and vegetables per day? (!) 2-3   How many sweetened beverages each day? 0-1         4/28/2025   Exercise   Days per week of moderate/strenous exercise 6 days   Average minutes spent exercising at this level 30 min         4/28/2025    Social Factors   Frequency of gathering with friends or relatives More than three times a week   Worry food won't last until get money to buy more Patient declined   Food not last or not have enough money for food? No   Do you have housing? (Housing is defined as stable permanent housing and does not include staying outside in a car, in a tent, in an abandoned building, in an overnight shelter, or couch-surfing.) Yes   Are you worried about losing your housing? Patient declined   Lack of transportation? Patient declined   Unable to get utilities (heat,electricity)? No         4/28/2025   Fall Risk   Fallen 2 or more times in the past year? No   Trouble with walking or balance? Yes   Gait Speed Test (Document in seconds) 5   Gait Speed Test Interpretation Less than or equal to 5.00 seconds - PASS          4/28/2025   Dental   Dentist two times every year? (!) DECLINE     Today's PHQ-2 Score:       3/26/2025     9:37 AM   PHQ-2 ( 1999 [employer])   Q1: Little interest or pleasure in doing things 1   Q2: Feeling down, depressed or hopeless 1   PHQ-2 Score 2    Q1: Little interest or pleasure in doing things Several days   Q2: Feeling down, depressed or hopeless Several days   PHQ-2 Score 2       Patient-reported         4/28/2025   Substance Use   Alcohol more than 3/day or more than 7/wk Yes   How often do you have a drink containing alcohol 2 to 3 times a week   How many alcohol drinks on typical day 3 or 4   How often do you have 5+ drinks at one occasion Never   Audit 2/3 Score 1   How often not able to stop drinking once started Never   How often failed to do what normally expected Never   How often needed first drink in am after a heavy drinking session Never   How often feeling of guilt or remorse after drinking Never   How often unable to remember what happened the night before Never   Have you or someone else been injured because of your drinking No   Has anyone been concerned or suggested you cut down on  drinking No   TOTAL SCORE - AUDIT 4   Do you use any other substances recreationally? No     Social History     Tobacco Use    Smoking status: Former     Current packs/day: 0.00     Average packs/day: 0.2 packs/day for 30.0 years (6.0 ttl pk-yrs)     Types: Cigarettes     Start date: 1969     Quit date: 1999     Years since quittin.3    Smokeless tobacco: Never   Substance Use Topics    Alcohol use: Yes     Comment: Occasional on the weekends - 12 pack    Drug use: No     {Provider  If there are gaps in the social history shown above, please follow the link to update and then refresh the note Link to Social and Substance History :837077}     Mammogram Screening - Mammogram every 1-2 years updated in Health Maintenance based on mutual decision making        2025   STI Screening   New sexual partner(s) since last STI/HIV test? No     History of abnormal Pap smear: { :856032}       ASCVD Risk   The ASCVD Risk score (Tapan MELGOZA, et al., 2019) failed to calculate for the following reasons:    Risk score cannot be calculated because patient has a medical history suggesting prior/existing ASCVD    {Link to Fracture Risk Assessment Tool (Optional):914049}    {Provider  REQUIRED FOR AWV Use the storyboard to review patient history, after sections have been marked as reviewed, refresh note to capture documentation:604045}   Reviewed and updated as needed this visit by Provider                    Past Medical History:   Diagnosis Date    Hypertension     Tobacco use disorder 2009     Past Surgical History:   Procedure Laterality Date    NO HISTORY OF SURGERY       Lab work is in process  Labs reviewed in EPIC  BP Readings from Last 3 Encounters:   25 (!) 146/94   25 132/82   25 (!) 186/88    Wt Readings from Last 3 Encounters:   25 70.1 kg (154 lb 9.6 oz)   25 71 kg (156 lb 8 oz)   25 67.7 kg (149 lb 4 oz)                  Patient Active Problem List  "  Diagnosis    Family hx-breast malignancy    HTN (hypertension)    Paroxysmal ventricular tachycardia (H)    Paroxysmal supraventricular tachycardia    Dizziness    Acute ischemic stroke (H)    Left-sided weakness     Past Surgical History:   Procedure Laterality Date    NO HISTORY OF SURGERY         Social History     Tobacco Use    Smoking status: Former     Current packs/day: 0.00     Average packs/day: 0.2 packs/day for 30.0 years (6.0 ttl pk-yrs)     Types: Cigarettes     Start date: 1969     Quit date: 1999     Years since quittin.3    Smokeless tobacco: Never   Substance Use Topics    Alcohol use: Yes     Comment: Occasional on the weekends - 12 pack     Family History   Problem Relation Age of Onset    Breast Cancer Mother     Heart Disease Father         issues    Breast Cancer Sister              Review of Systems  CONSTITUTIONAL: NEGATIVE for fever, chills, change in weight  INTEGUMENTARY/SKIN: NEGATIVE for worrisome rashes, moles or lesions  EYES: NEGATIVE for vision changes or irritation  ENT/MOUTH: NEGATIVE for ear, mouth and throat problems  RESP: NEGATIVE for significant cough or SOB  BREAST: NEGATIVE for masses, tenderness or discharge  CV: NEGATIVE for chest pain, palpitations or peripheral edema  GI: NEGATIVE for nausea, abdominal pain, heartburn, or change in bowel habits  : NEGATIVE for frequency, dysuria, or hematuria  MUSCULOSKELETAL: NEGATIVE for significant arthralgias or myalgia  NEURO: NEGATIVE for weakness, dizziness or paresthesias  ENDOCRINE: NEGATIVE for temperature intolerance, skin/hair changes  HEME: NEGATIVE for bleeding problems  PSYCHIATRIC: NEGATIVE for changes in mood or affect     Objective    Exam  BP (!) 146/94   Pulse 82   Temp 98.6  F (37  C) (Tympanic)   Resp 16   Ht 1.676 m (5' 5.98\")   Wt 70.1 kg (154 lb 9.6 oz)   SpO2 98%   BMI 24.97 kg/m     Estimated body mass index is 24.97 kg/m  as calculated from the following:    Height as of this " "encounter: 1.676 m (5' 5.98\").    Weight as of this encounter: 70.1 kg (154 lb 9.6 oz).    Physical Exam  {FEMALE - complete :241993}        Signed Electronically by: Lauren Weldon PA-C  {Email feedback regarding this note to [email or web address]   :827690}  "

## 2025-04-28 NOTE — LETTER
April 30, 2025      Tierney Pearson  [address] [place] [address]        Dear ,    We are writing to inform you of your test results.    Labs are stable.    If you have any questions or concerns, please call the clinic at the number listed above.     Sincerely,    Taylor Weldon PA-C      Resulted Orders   Basic metabolic panel  (Ca, Cl, CO2, Creat, Gluc, K, Na, BUN)   Result Value Ref Range    Sodium 134 (L) 135 - 145 mmol/L    Potassium 4.0 3.4 - 5.3 mmol/L    Chloride 100 98 - 107 mmol/L    Carbon Dioxide (CO2) 24 22 - 29 mmol/L    Anion Gap 10 7 - 15 mmol/L    Urea Nitrogen 4.3 (L) 8.0 - 23.0 mg/dL    Creatinine 0.49 (L) 0.51 - 0.95 mg/dL    GFR Estimate >90 >60 mL/min/1.73m2      Comment:      eGFR calculated using 2021 CKD-EPI equation.    Calcium 9.2 8.8 - 10.4 mg/dL    Glucose 84 70 - 99 mg/dL        PATIENT VOIDED

## 2025-04-28 NOTE — PATIENT INSTRUCTIONS
Patient Education   Preventive Care Advice   This is general advice given by our system to help you stay healthy. However, your care team may have specific advice just for you. Please talk to your care team about your preventive care needs.  Nutrition  Eat 5 or more servings of fruits and vegetables each day.  Try wheat bread, brown rice and whole grain pasta (instead of white bread, rice, and pasta).  Get enough calcium and vitamin D. Check the label on foods and aim for 100% of the RDA (recommended daily allowance).  Lifestyle  Exercise at least 150 minutes each week  (30 minutes a day, 5 days a week).  Do muscle strengthening activities 2 days a week. These help control your weight and prevent disease.  No smoking.  Wear sunscreen to prevent skin cancer.  Have a dental exam and cleaning every 6 months.  Yearly exams  See your health care team every year to talk about:  Any changes in your health.  Any medicines your care team has prescribed.  Preventive care, family planning, and ways to prevent chronic diseases.  Shots (vaccines)   HPV shots (up to age 26), if you've never had them before.  Hepatitis B shots (up to age 59), if you've never had them before.  COVID-19 shot: Get this shot when it's due.  Flu shot: Get a flu shot every year.  Tetanus shot: Get a tetanus shot every 10 years.  Pneumococcal, hepatitis A, and RSV shots: Ask your care team if you need these based on your risk.  Shingles shot (for age 50 and up)  General health tests  Diabetes screening:  Starting at age 35, Get screened for diabetes at least every 3 years.  If you are younger than age 35, ask your care team if you should be screened for diabetes.  Cholesterol test: At age 39, start having a cholesterol test every 5 years, or more often if advised.  Bone density scan (DEXA): At age 50, ask your care team if you should have this scan for osteoporosis (brittle bones).  Hepatitis C: Get tested at least once in your life.  STIs (sexually  transmitted infections)  Before age 24: Ask your care team if you should be screened for STIs.  After age 24: Get screened for STIs if you're at risk. You are at risk for STIs (including HIV) if:  You are sexually active with more than one person.  You don't use condoms every time.  You or a partner was diagnosed with a sexually transmitted infection.  If you are at risk for HIV, ask about PrEP medicine to prevent HIV.  Get tested for HIV at least once in your life, whether you are at risk for HIV or not.  Cancer screening tests  Cervical cancer screening: If you have a cervix, begin getting regular cervical cancer screening tests starting at age 21.  Breast cancer scan (mammogram): If you've ever had breasts, begin having regular mammograms starting at age 40. This is a scan to check for breast cancer.  Colon cancer screening: It is important to start screening for colon cancer at age 45.  Have a colonoscopy test every 10 years (or more often if you're at risk) Or, ask your provider about stool tests like a FIT test every year or Cologuard test every 3 years.  To learn more about your testing options, visit:   .  For help making a decision, visit:   https://bit.ly/jf25689.  Prostate cancer screening test: If you have a prostate, ask your care team if a prostate cancer screening test (PSA) at age 55 is right for you.  Lung cancer screening: If you are a current or former smoker ages 50 to 80, ask your care team if ongoing lung cancer screenings are right for you.  For informational purposes only. Not to replace the advice of your health care provider. Copyright   2023 [facility]. All rights reserved. Clinically reviewed by the [facility]. theeventwall 236599 - REV 01/24.  Preventing Falls: Care Instructions  Injuries and health problems such as trouble walking or poor eyesight can increase your risk of falling. So can some medicines. But there are things you can do to help  "prevent falls. You can exercise to get stronger. You can also arrange your home to make it safer.    Talk to your doctor about the medicines you take. Ask if any of them increase the risk of falls and whether they can be changed or stopped.   Try to exercise regularly. It can help improve your strength and balance. This can help lower your risk of falling.         Practice fall safety and prevention.   Wear low-heeled shoes that fit well and give your feet good support. Talk to your doctor if you have foot problems that make this hard.  Carry a cellphone or wear a medical alert device that you can use to call for help.  Use stepladders instead of chairs to reach high objects. Don't climb if you're at risk for falls. Ask for help, if needed.  Wear the correct eyeglasses, if you need them.        Make your home safer.   Remove rugs, cords, clutter, and furniture from walkways.  Keep your house well lit. Use night-lights in hallways and bathrooms.  Install and use sturdy handrails on stairways.  Wear nonskid footwear, even inside. Don't walk barefoot or in socks without shoes.        Be safe outside.   Use handrails, curb cuts, and ramps whenever possible.  Keep your hands free by using a shoulder bag or backpack.  Try to walk in well-lit areas. Watch out for uneven ground, changes in pavement, and debris.  Be careful in the winter. Walk on the grass or gravel when sidewalks are slippery. Use de-icer on steps and walkways. Add non-slip devices to shoes.    Put grab bars and nonskid mats in your shower or tub and near the toilet. Try to use a shower chair or bath bench when bathing.   Get into a tub or shower by putting in your weaker leg first. Get out with your strong side first. Have a phone or medical alert device in the bathroom with you.   Where can you learn more?  Go to https://www.[employer].net/patiented  Enter G117 in the search box to learn more about \"Preventing Falls: Care Instructions.\"  Current as of: " July 31, 2024  Content Version: 14.4    9537-2679 [employer].   Care instructions adapted under license by your healthcare professional. If you have questions about a medical condition or this instruction, always ask your healthcare professional. [employer] disclaims any warranty or liability for your use of this information.    Learning About Stress  What is stress?     Stress is your body's response to a hard situation. Your body can have a physical, emotional, or mental response. Stress is a fact of life for most people, and it affects everyone differently. What causes stress for you may not be stressful for someone else.  A lot of things can cause stress. You may feel stress when you go on a job interview, take a test, or run a race. This kind of short-term stress is normal and even useful. It can help you if you need to work hard or react quickly. For example, stress can help you finish an important job on time.  Long-term stress is caused by ongoing stressful situations or events. Examples of long-term stress include long-term health problems, ongoing problems at work, or conflicts in your family. Long-term stress can harm your health.  How does stress affect your health?  When you are stressed, your body responds as though you are in danger. It makes hormones that speed up your heart, make you breathe faster, and give you a burst of energy. This is called the fight-or-flight stress response. If the stress is over quickly, your body goes back to normal and no harm is done.  But if stress happens too often or lasts too long, it can have bad effects. Long-term stress can make you more likely to get sick, and it can make symptoms of some diseases worse. If you tense up when you are stressed, you may develop neck, shoulder, or low back pain. Stress is linked to high blood pressure and heart disease.  Stress also harms your emotional health. It can make you reyes, tense, or depressed. Your  relationships may suffer, and you may not do well at work or school.  What can you do to manage stress?  You can try these things to help manage stress:   Do something active. Exercise or activity can help reduce stress. Walking is a great way to get started. Even everyday activities such as housecleaning or yard work can help.  Try yoga or evelyn chi. These techniques combine exercise and meditation. You may need some training at first to learn them.  Do something you enjoy. For example, listen to music or go to a movie. Practice your hobby or do volunteer work.  Meditate. This can help you relax, because you are not worrying about what happened before or what may happen in the future.  Do guided imagery. Imagine yourself in any setting that helps you feel calm. You can use online videos, books, or a teacher to guide you.  Do breathing exercises. For example:  From a standing position, bend forward from the waist with your knees slightly bent. Let your arms dangle close to the floor.  Breathe in slowly and deeply as you return to a standing position. Roll up slowly and lift your head last.  Hold your breath for just a few seconds in the standing position.  Breathe out slowly and bend forward from the waist.  Let your feelings out. Talk, laugh, cry, and express anger when you need to. Talking with supportive friends or family, a counselor, or a shelbie leader about your feelings is a healthy way to relieve stress. Avoid discussing your feelings with people who make you feel worse.  Write. It may help to write about things that are bothering you. This helps you find out how much stress you feel and what is causing it. When you know this, you can find better ways to cope.  What can you do to prevent stress?  You might try some of these things to help prevent stress:  Manage your time. This helps you find time to do the things you want and need to do.  Get enough sleep. Your body recovers from the stresses of the day while  "you are sleeping.  Get support. Your family, friends, and community can make a difference in how you experience stress.  Limit your news feed. Avoid or limit time on social media or news that may make you feel stressed.  Do something active. Exercise or activity can help reduce stress. Walking is a great way to get started.  Where can you learn more?  Go to https://www.[employer].net/patiented  Enter N032 in the search box to learn more about \"Learning About Stress.\"  Current as of: October 24, 2024  Content Version: 14.4    5641-9152 [employer].   Care instructions adapted under license by your healthcare professional. If you have questions about a medical condition or this instruction, always ask your healthcare professional. [employer] disclaims any warranty or liability for your use of this information.    9 Ways to Cut Back on Drinking  Maybe you've found yourself drinking more alcohol than you'd prefer. If you want to cut back, here are some ideas to try.    Think before you drink.  Do you really want a drink, or is it just a habit? If you're used to having a drink at a certain time, try doing something else then.     Look for substitutes.  Find some no-alcohol drinks that you enjoy, like flavored seltzer water, tea with honey, or tonic with a slice of lime. Or try alcohol-free beer or \"virgin\" cocktails (without the alcohol).     Drink more water.  Use water to quench your thirst. Drink a glass of water before you have any alcohol. Have another glass along with every drink or between drinks.     Shrink your drink.  For example, have a bottle of beer instead of a pint. Use a smaller glass for wine. Choose drinks with lower alcohol content (ABV%). Or use less liquor and more mixer in cocktails.     Slow down.  It's easy to drink quickly and without thinking about it. Pay attention, and make each drink last longer.     Do the math.  Total up how much you spend on alcohol each month. How " "much is that a year? If you cut back, what could you do with the money you save?     Take a break.  Choose a day or two each week when you won't drink at all. Notice how you feel on those days, physically and emotionally. How did you sleep? Do you feel better? Over time, add more break days.     Count calories.  Would you like to lose some weight? For some people that's a good motivator for cutting back. Figure out how many calories are in each drink. How many does that add up to in a day? In a week? In a month?     Practice saying no.  Be ready when someone offers you a drink. Try: \"Thanks, I've had enough.\" Or \"Thanks, but I'm cutting back.\" Or \"No, thanks. I feel better when I drink less.\"   Current as of: August 20, 2024  Content Version: 14.4    0409-1812 [employer].   Care instructions adapted under license by your healthcare professional. If you have questions about a medical condition or this instruction, always ask your healthcare professional. [employer] disclaims any warranty or liability for your use of this information.     "

## 2025-05-06 ENCOUNTER — THERAPY VISIT (OUTPATIENT)
Dept: OCCUPATIONAL THERAPY | Facility: CLINIC | Age: 65
End: 2025-05-06
Attending: PHYSICIAN ASSISTANT
Payer: COMMERCIAL

## 2025-05-06 DIAGNOSIS — R41.3 MEMORY LOSS: ICD-10-CM

## 2025-05-06 PROCEDURE — 97535 SELF CARE MNGMENT TRAINING: CPT | Mod: GO

## 2025-05-06 PROCEDURE — 97166 OT EVAL MOD COMPLEX 45 MIN: CPT | Mod: GO

## 2025-05-06 PROCEDURE — 97530 THERAPEUTIC ACTIVITIES: CPT | Mod: GO

## 2025-05-06 NOTE — PROGRESS NOTES
OCCUPATIONAL THERAPY EVALUATION  Type of Visit: Evaluation       Fall Risk Screen:  Have you fallen 2 or more times in the past year?: Yes  Have you fallen and had an injury in the past year?: Yes      Subjective        Presenting condition or subjective complaint: following up on stroke  Date of onset: 25 (order date)    Relevant medical history: Arthritis; Dizziness; Foot drop; High blood pressure; Migraines or headaches; Severe headaches; Stroke; Vision problems   Dates & types of surgery: carpal tunnel date?    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   Prior therapy history for the same diagnosis, illness or injury: No      Prior Level of Function  Transfers: Independent  Ambulation: Independent  ADL: Independent  IADL: Driving, Finances, Housekeeping, Laundry, Meal preparation, Medication management, School    Living Environment  Social support: With a significant other or spouse   Type of home: House   Stairs to enter the home: Yes   Is there a railing: Yes     Ramp:     Stairs inside the home: Yes   Is there a railing: Yes     Help at home: None  Equipment owned:       Employment: Yes    Hobbies/Interests: gardening, fishing, wood carvlng    Patient goals for therapy: alot         Objective     Cognistat    SUMMARY OF TEST:    The Cognistat is designed to assess functioning in five major areas:  Language, Constructions, Memory, Calculations and Reasoning.  Attention, Level of Consciousness and Orientation are assessed independently.  Scores are plotted on a profile which illustrates the overall pattern of abilities and disabilities.  Scores can be compared to norms and/or representative profiles for various populations.    DATE OF TESTIN2025    RESULTS OF TESTING:    This patient scores in the average range for all areas assessed., Level of Consciousness, Orientation, Attention, Language, Constructions, Calculations, and Reasoning    This patient scores in  the mildly impaired for Memory    Test results comments:      The patient was assessed in a private room with the Cognistat screening tool to evaluate multiple cognitive domains.Assessment areas were explained to the patient prior to initiation.     The patient demonstrated intact orientation to person, place, time, date, day of the week, month, and year.During memory testing, the patient successfully registered four words and was able to recall them during the first and second trials. On delayed recall, the patient independently recalled 1 out of 4 words and required categorical cues to retrieve the remaining words.     The patient demonstrated the ability to follow a 3-step command, repeat sentences, and exhibited intact language comprehension and expressive language skills. Visual memory and constructional ability were observed through successful reproduction of a visual figure after a 10-second viewing period.    INTERPRETATION OF TEST RESULTS:    The patient demonstrated average performance across most cognitive domains, including level of consciousness, orientation, attention, language, constructional ability, calculation, and reasoning, indicating intact general cognitive functioning and executive processing skills.    However, the patient scored in the mildly impaired range for memory, suggesting difficulty with encoding, retention, or retrieval of new information. This may impact the patient's ability to recall instructions, complete multi-step tasks, or manage time-sensitive responsibilities such as medication schedules or appointments. Compensatory memory strategies and external supports may be beneficial to enhance functional independence.    Factors affecting performance:  Educational background  Decreased UE strength/coordination  Limited Endurance  New or complex medical issue    Recommendations:  Memory Strategy Training:  Implement internal strategies such as chunking, association, and visualization  to enhance word retention and recall.      TIME ADMINISTERING TEST: 20 minutes     TIME FOR INTERPRETATION AND PREPARATION OF REPORT: 5 minutes    TOTAL TIME: 25 minutes    Cognitive Status Examination  Orientation: Oriented to person, place and time   Level of Consciousness: Alert  Follows Commands and Answers Questions: 100% of the time  Personal Safety and Judgement: Intact  Memory: Impaired  Attention: Reports problems attending, Difficulty with dual tasking   Organization/Problem Solving: Categorization of information impaired  Executive Function: Working memory impaired, decreased storage of information for performing tasks, Planning ability impaired    VISUAL SKILLS  Visual Acuity: No deficits identified(wears cheaters)  Visual Field: Appears normal  Visual Attention: Appears normal  Oculomotor:     SENSATION: UE Sensation WNL, LE Sensation WNL    POSTURE: WFL  RANGE OF MOTION: UE AROM WFL  STRENGTH: UE Strength WFL, LE Strength WFL  MUSCLE TONE: WFL  COORDINATION: WFL  BALANCE: WFL    FUNCTIONAL MOBILITY:  Within functional limits (WFL) for ambulation; patient ambulates independently but reports decreased activity tolerance and balance concerns since stroke.    BED MOBILITY:  WFL. No concerns noted with repositioning or mobility in bed.    TRANSFERS:  WFL. Independent with sit-to-stand and surface transfers.    BATHING:  Independent with bathing tasks; however, patient reports difficulty entering and exiting the tub due to decreased lower extremity strength and balance, which increases fall risk.    UPPER BODY DRESSING:  Independent, though patient reports difficulty initiating zippers and managing buttons due to decreased fine motor coordination.    LOWER BODY DRESSING:  Independent with dressing; however, patient reports balance challenges, particularly when donning/doffing pants and footwear since stroke onset.    TOILETING:  WFL. No current concerns with toileting or hygiene  management.    GROOMING:  Independent, though patient notes increasing difficulty with hair management tasks such as styling and securing ponytails, suggesting reduced upper extremity coordination or endurance.    EATING/SELF-FEEDING:  No concerns reported; patient is independent with feeding.    ACTIVITY TOLERANCE:  Decreased. Patient reports low energy levels and fatigue with daily activities, limiting sustained engagement.    INSTRUMENTAL ACTIVITIES OF DAILY LIVING (IADLs):    Meal Planning/Preparation:   Independent; patient is attempting tasks but reports slower performance and difficulty with cutting food due to impaired coordination or strength.    Home/Financial Management: Reports recent errors with tasks such as writing the wrong date on a check, indicating potential cognitive decline or attention lapses.    Communication/Computer Use:   No phone or technology use noted; may benefit from training or environmental adaptation.    Community Mobility:   Drives locally but reports occasional disorientation or getting lost, suggesting decreased spatial orientation or memory impairment.    Medication Management:  Independent with setup; occasionally forgets to take the evening dose, indicating possible short-term memory deficits or need for a medication reminder system.    Patient-Reported Concerns:    Short-term memory difficulties    Writing challenges    Anger management    Assessment & Plan   CLINICAL IMPRESSIONS  Medical Diagnosis: Memory loss    Treatment Diagnosis: Fatigue, impaired coordination, and decreased balance contribute to functional limitations in ADLs and IADLs, increasing fall risk and reducing task IND.    Impression/Assessment: Pt is a 64 year old female presenting to Occupational Therapy due to .  The following significant findings have been identified: Impaired activity tolerance, Impaired cognition, Inability to direct their own care, Impaired safety/judgement, and Impaired strength.   These identified deficits interfere with their ability to perform self care tasks, household chores, driving , community mobility, medication management, and financial management as compared to previous level of function.     Clinical Decision Making (Complexity):  Assessment of Occupational Performance: 3-5 Performance Deficits  Occupational Performance Limitations:   bathing/showering, communication management, driving and community mobility, health management and maintenance, home establishment and management, and social participation  Clinical Decision Making (Complexity): Moderate complexity    PLAN OF CARE  Treatment Interventions:      Long Term Goals   OT Goal 1  Goal Identifier: Home Program:  Goal Description: The pt. will demonstrate an understanding of cognitive assessment results and therapeutic recommendations to support safety in IADLs and ADLs while optimizing cognitive functioning in daily activities.  Rationale: In order to maximize safety and independence with performance of self-care activities;In order to maximize safety and independence with ADL/IADLs  Target Date: 08/06/25  OT Goal 2  Goal Identifier: Memory  aid:  Goal Description: Pt.  will verbalize understanding of how to use a memory aid (e.g., checklist) to support self-care tasks, such as taking medication or grooming, within one session.  Rationale: In order to maximize safety and independence with performance of self-care activities;In order to maximize safety and independence with ADL/IADLs  Target Date: 08/06/25  OT Goal 3  Goal Identifier: Anger management:  Goal Description: Pt. will identify and utilize 2 anger management strategies (e.g., deep breathing, break requests) when frustrated during tasks in 3 consecutive sessions.  Rationale: In order to maximize safety and independence with performance of self-care activities;In order to maximize safety and independence with ADL/IADLs  Target Date: 08/06/25  OT Goal 4  Goal  Identifier: Writing tasks:  Goal Description: Pt. will independently complete a 10-15 minute structured table-top writing task with minimal frustration and appropriate pacing strategies.  Rationale: In order to maximize safety and independence with performance of self-care activities;In order to maximize safety and independence with ADL/IADLs  Target Date: 08/06/25      Frequency of Treatment: 1 x/week  Duration of Treatment: 1.5 months     Recommended Referrals to Other Professionals: Occupational Therapy  Education Assessment: Learner/Method: Patient;Listening;Reading;Demonstration;Pictures/Video;No Barriers to Learning  Education Comments: POC.     Risks and benefits of evaluation/treatment have been explained.   Patient/Family/caregiver agrees with Plan of Care.     Evaluation Time:        Signing Clinician: JEFFERY Parkinson

## 2025-05-12 ENCOUNTER — TELEPHONE (OUTPATIENT)
Dept: FAMILY MEDICINE | Facility: CLINIC | Age: 65
End: 2025-05-12
Payer: COMMERCIAL

## 2025-05-12 NOTE — TELEPHONE ENCOUNTER
Patient calls to see when PCP will be in, as she'll have a return to work form that her job will want completed by Wednesday.  She states she has discussed this with PCP, who is aware of what is going on.   RN advised that PCP is in Today, out Tuesday and back in Wednesday.  RN advised it would be best to drop off the form today for review so it can be done by Wednesday, but patient states she has no transportation, as her car is being worked on.  She does not have [employer] to send via that route either, so will drop it off Wednesday morning.    Marianela Guzman RN  [facility]

## 2025-05-14 ENCOUNTER — TELEPHONE (OUTPATIENT)
Dept: FAMILY MEDICINE | Facility: CLINIC | Age: 65
End: 2025-05-14
Payer: COMMERCIAL

## 2025-05-14 ENCOUNTER — THERAPY VISIT (OUTPATIENT)
Dept: OCCUPATIONAL THERAPY | Facility: CLINIC | Age: 65
End: 2025-05-14
Attending: PHYSICIAN ASSISTANT
Payer: COMMERCIAL

## 2025-05-14 DIAGNOSIS — R41.3 MEMORY LOSS: Primary | ICD-10-CM

## 2025-05-14 PROCEDURE — 97530 THERAPEUTIC ACTIVITIES: CPT | Mod: GO

## 2025-05-14 NOTE — TELEPHONE ENCOUNTER
Forms placed on providers desk.   Forms/Letter Request    Type of form/letter: FMLA - Unknown      Is Release of Information needed?: Yes  Was an CANDACE obtained?  Yes    Do we have the form/letter: Yes: Form     Who is the form from? Patient    Where did/will the form come from? Patient or family brought in       When is form/letter needed by: TODAY     How would you like the form/letter returned: Fax : [phone]    Patient Notified form requests are processed in 5-7 business days:Yes    Okay to leave a detailed message?: Yes at Home number on file [phone] (home)

## 2025-05-14 NOTE — TELEPHONE ENCOUNTER
Patient came in to clinic today to  forms. I was able to talk with her and filled out forms. Gave patient original and faxed form attn Meliza.  She will work with restrictions and limited hours from 5/19/25-6/20/25 - 5 hours/day 4 days/week  Taylor Weldon PA-C

## 2025-05-21 ENCOUNTER — THERAPY VISIT (OUTPATIENT)
Dept: OCCUPATIONAL THERAPY | Facility: CLINIC | Age: 65
End: 2025-05-21
Attending: PHYSICIAN ASSISTANT
Payer: COMMERCIAL

## 2025-05-21 DIAGNOSIS — R41.3 MEMORY LOSS: Primary | ICD-10-CM

## 2025-05-21 PROCEDURE — 97530 THERAPEUTIC ACTIVITIES: CPT | Mod: GO

## 2025-06-06 ENCOUNTER — TELEPHONE (OUTPATIENT)
Dept: VASCULAR SURGERY | Facility: CLINIC | Age: 65
End: 2025-06-06

## 2025-06-06 NOTE — TELEPHONE ENCOUNTER
Vascular Referral Intake    Appointment note (to be pasted into appt note. Also add where additional info is located ie: outside images pushed to PACS, in Epic, sent to HIM, etc):   Referred by SHAWN Weldon for Raynauds vs Vascular issues    Specialty: Vascular Medicine    Specific Provider if Necessary:  Dr. Abdulkadir Hill, Dr. Logan Teixeira, or Dr. Silva Barron    Visit Type: New    Time Frame: Next Available    Testing/Imaging Needed Before Consult: N/A    Laurent or bed needed: No    Special Instructions: Yes: Pt prefers [place]- since hands and feet please discuss if Dr Hill wants cold stress test prior to visit. Ok to have seen in [facility] if pt wants to be seen sooner and no testing needed.     *Schedulers: Please send welcome letter to patient after appointment(s) scheduled*

## 2025-06-11 ENCOUNTER — TELEPHONE (OUTPATIENT)
Dept: FAMILY MEDICINE | Facility: CLINIC | Age: 65
End: 2025-06-11
Payer: COMMERCIAL

## 2025-06-11 DIAGNOSIS — I63.9 ACUTE ISCHEMIC STROKE (H): Primary | ICD-10-CM

## 2025-06-11 NOTE — TELEPHONE ENCOUNTER
Please tell patient thank you for the sweet note she sent!!  I also heard back from [facility] pharmacy regarding the supplements she was asking about and interactions with plavix. They recommended if she would like to start them  (I know she was not sure) to schedule a virtual consult with [facility]. I'm happy to put in referral if she would like.  Taylor Weldon PA-C

## 2025-06-14 NOTE — PROGRESS NOTES
__________________________________________________________      [facility]   [phone]  __________________________________________________________         History of Present Illness   Chief Complaint: Patient presents with:  Stroke: Patient states that she is having a lot of anger and not be able to do things she used to do. .     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    She was hospitalized at [facility] on 3/15/25. Prior to the hospital stay, she had a past medical history of HTN, tobacco use, and paroxysmal supraventricular tachycardia.    She presented to the hospital after a 4-day history of dizziness and left-sided weakness on 3/15/25. She was started on DAPT for 90 days then be on Aspirin 81mg indefinitely for recurrent stroke prevention. Since being discharged, she reports doing better, but is frustrated that she can't do all the things she used to post-stroke. Tierney does continue to have slurred speech, a left facial droop, and some weakness in her left arm and leg. Tierney also reports of crying outbursts after her stroke in March; which used to only be 1-2 times daily right after her stroke and now occurring atleast 3-4 times daily. Tierney reports a lot of frustration and find herself very angry about her random crying outburst situations; that she sometimes feels embarrassed to be around her family members and friends.    Tierney completed PT/OT sessions on 5/21/25. She continues to do home strengthening exercise daily with her partner (Conor, a hockey ) in their backyard and walk atleast 30 minutes daily. Tierney also reported today that she quit smoking since her stroke in March and plan to not smoke anymore. Her blood pressure in clinic today was 144/88, however she does check her BP at home sometimes and BP is usually 130s/80s.    Of note, she reports that  she did missed a few days of her Plavix.     Modified Alton Scale  Score: 2-Slight disability; unable to carry out all previous activities, but able to look after own affairs    Stroke Evaluation Summarized:  New results resulted and reviewed by me today are in BOLD below.  I personally reviewed the following neuroimaging studies today and the comments above reflect my own personal interpretation of the images: images: CTA head/neck, MRI brain, and I also showed MRI brain image to the patient myself today.    MRI/Head CT MRI brain: Multiple foci of diffusion restriction within the cerebral hemispheres and right david-aleksander are favored to represent late hyperacute to acute infarctions.    Intracranial Vasculature CTA head:   No large vessel occlusion.  2.  Mild intracranial atherosclerosis.  3.  2.5 mm laterally directed outpouching arising from the distal right basilar artery could represent a small aneurysm or residual opacification of a stenotic or partially occluded right P1 segment. An aneurysm is favored.   Cervical Vasculature CTA neck: (reviewed by Dr. Martinez as well)  1.  Mild carotid artery atherosclerotic disease. No hemodynamically significant narrowing in either carotid artery by NASCET criteria.  2.  Moderate atherosclerotic stenosis of the dominant right vertebral artery origin.        Echocardiogram TTE:  EF 66%, LA mildly dilated. No regional wall motion abnormalities noted. No evidence of PFO. Sinus Rhythm   EKG/Telemetry EKG: Sinus rhythm    Zio monitoring from 3/22/2025 to 4/5/2025 (duration 14d 0h): No sustained tachyarrhythmias. No atrial fibrillation detected.    Additional    CT chest/abdomen/pelvis: No evidence of malignancy.     Cardiolipin panel: negative       Labs Lab Results   Component Value Date    LDL 86 03/15/2025    A1C 4.8 03/15/2025    CTROPT 8 03/15/2025    INR 1.02 07/22/2024    INR 1.11 09/29/2022             Home Medications     Current Outpatient Medications   Medication Sig  "Dispense Refill    amLODIPine (NORVASC) 10 MG tablet Take 1 tablet (10 mg) by mouth daily. 90 tablet 1    aspirin 81 MG EC tablet Take 1 tablet (81 mg) by mouth daily. Start once you finish the supply of aspirin 325mg and clopidogrel. 270 tablet 0    atorvastatin (LIPITOR) 40 MG tablet Take 1 tablet (40 mg) by mouth every evening. 90 tablet 2    clopidogrel (PLAVIX) 75 MG tablet Take 1 tablet (75 mg) by mouth daily. For 90 days then stop. 90 tablet 0    loratadine (CLARITIN) 10 MG tablet Take 10 mg by mouth every other day.      polyethylene glycol (MIRALAX) 17 GM/Dose powder Take 17 g by mouth daily. 850 g 1     No current facility-administered medications for this visit.            Physical Examination     Physical Exam    Estimated body mass index is 25.02 kg/m  as calculated from the following:    Height as of this encounter: 1.676 m (5' 6\").    Weight as of this encounter: 70.3 kg (155 lb).    BP (!) 144/88   Pulse 81   Ht 1.676 m (5' 6\")   Wt 70.3 kg (155 lb)   BMI 25.02 kg/m         General Exam  General: Sitting up in chair in no acute distress  Cardio:  RRR  Pulmonary:  no respiratory distress    Neurologic:  Mental Status:  Alert, oriented x 3, follows commands, naming and repetition normal, dysarthria  Cranial Nerves: Dysarthria, mild left facial droop, visual fields intact, PERRL, EOMI with normal smooth pursuit, facial sensation intact and symmetric, hearing not formally tested but intact to conversation, palate elevation symmetric and uvula midline, tongue protrusion midline  Motor: Mild drift on LUE and LLE. RUE and RLE intact.  Reflexes:  Deferred  Sensory:  Light touch sensation intact and symmetric throughout upper and lower extremities, no extinction on double simultaneous stimulation   Coordination:  Normal finger-to-nose and heel-to-shin bilaterally without dysmetria, rapid alternating movements symmetric  Station/Gait:  deferred         Screenings and Questionnaires:     Tobacco:    Tobacco " Use      Smoking status: Former        Packs/day: 0.00        Years: 0.2 packs/day for 30.0 years (6.0 ttl pk-yrs)        Types: Cigarettes        Start date: 1969        Quit date: 1999        Years since quittin.4      Smokeless tobacco: Never    Sleep Apnea:       Depression:      3/26/2025     9:37 AM 10/10/2022     9:49 AM   PHQ-2 (  [employer])   Q1: Little interest or pleasure in doing things 1 1    Q2: Feeling down, depressed or hopeless 1 0    PHQ-2 Score 2  1   Q1: Little interest or pleasure in doing things Several days Several days   Q2: Feeling down, depressed or hopeless Several days Not at all   PHQ-2 Score 2 1       Patient-reported    Proxy-reported       Stroke Recovery and Risk Factors:       No data to display                    Assessment and Plan     1. Multifocal infarcts in the bilateral cerebral hemispheres (Left > Right) and right aleksander. Etiology ESUS.  2. Moderate stenosis of right vertebral artery  3. Pseudobulbar affect    -Continue Aspirin 81 mg daily for secondary stroke prevention indefinitely. Was recommended to be on DAPT for 90 days, still have a few days left of Plavix. Recommend completing the Plavix she still have at home.  -Continue Lipitor 40 for LDL goal 40-70  - SBP goal <130/80 long term. Appreciate her PCP fo long term BP management.  -Provided extensive education and discussion of smoking cessation and good BP management long term and now even with probable aneurysm.  -Referral placed to [facility] for further eval of incidental 2.5 mm possible aneurysm. Will defer timing of repeat imaging to Neurosurgery team at this time.  -Psuedobullbar affect (of random 3-4 times of crying outburst daily lasting about 15-20 secs). Discussed with Tierney today that can try a SSRI such as Sertraline. If willing, can start with low dose of Sertraline like 50mg/d then increase to 100 mg/d after 2 weeks. Also explained to Tierney that the effect is slow and shouldn't expect  change for 3-4 weeks. After further discussion, she wants to hold off on starting any medication for now and would like to try more natural methods first.   - Return in about 3 months (around 9/17/2025) for Follow up, with me.    Stroke Education provided.  She will call us with any questions.  For any acute neurologic deficits she was advised to  go directly to the hospital rather than call the clinic.    VEL Bautista CNP  Neurology  06/17/2025   _________________________________________________________________    Billing:  Please note: for coding purposes this visit should be billed only as established and not new, since this patient was seen by my same subspecialty team ([facility]alth Vascular Neurology) during the hospitalization that this visit is a follow up for.  60 minutes spent by me on the date of the encounter doing chart review, history and exam, documentation and further activities per the note

## 2025-06-16 NOTE — TELEPHONE ENCOUNTER
Call placed to Patient.  No answer.  Left message with call back number for Patient to return call.    Sergio ALDANA, Clinic RN    [facility]

## 2025-06-16 NOTE — TELEPHONE ENCOUNTER
Patient Returning Call    Reason for call:  Returning Call     Information relayed to patient:  Relayed Provider message below   Patient would like to pursue MTM referral     Patient has additional questions:  No      Okay to leave a detailed message?: Yes at Home number on file [phone] (home)    Jose E Mcgregor, Clinic RN  [facility]

## 2025-06-16 NOTE — TELEPHONE ENCOUNTER
Call placed to Patient.  No answer.  Left message that referral had been placed and that they should contact her in the next 2 business days.  Left call back number for questions or concerns.  Sergio ALDANA, Clinic RN   [facility]

## 2025-06-17 ENCOUNTER — OFFICE VISIT (OUTPATIENT)
Dept: NEUROLOGY | Facility: CLINIC | Age: 65
End: 2025-06-17
Attending: HOSPITALIST
Payer: COMMERCIAL

## 2025-06-17 VITALS
SYSTOLIC BLOOD PRESSURE: 144 MMHG | DIASTOLIC BLOOD PRESSURE: 88 MMHG | HEART RATE: 81 BPM | WEIGHT: 155 LBS | HEIGHT: 66 IN | BODY MASS INDEX: 24.91 KG/M2

## 2025-06-17 DIAGNOSIS — I63.9 ACUTE ISCHEMIC STROKE (H): Primary | ICD-10-CM

## 2025-06-17 DIAGNOSIS — I67.1 NONRUPTURED CEREBRAL ANEURYSM: ICD-10-CM

## 2025-06-17 NOTE — LETTER
6/17/2025      Tierney Pearson  [address] [place] [address]      Dear Colleague,    Thank you for referring your patient, Tierney Pearson, to the [facility] [place]. Please see a copy of my visit note below.    __________________________________________________________      [facility]   [phone]  __________________________________________________________         History of Present Illness   Chief Complaint: Patient presents with:  Stroke: Patient states that she is having a lot of anger and not be able to do things she used to do. .     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    She was hospitalized at [facility] on 3/15/25. Prior to the hospital stay, she had a past medical history of HTN, tobacco use, and paroxysmal supraventricular tachycardia.    She presented to the hospital after a 4-day history of dizziness and left-sided weakness on 3/15/25. She was started on DAPT for 90 days then be on Aspirin 81mg indefinitely for recurrent stroke prevention. Since being discharged, she reports doing better, but is frustrated that she can't do all the things she used to post-stroke. Tiereny does continue to have slurred speech, a left facial droop, and some weakness in her left arm and leg. Tierney also reports of crying outbursts after her stroke in March; which used to only be 1-2 times daily right after her stroke and now occurring atleast 3-4 times daily. Tierney reports a lot of frustration and find herself very angry about her random crying outburst situations; that she sometimes feels embarrassed to be around her family members and friends.    Tierney completed PT/OT sessions on 5/21/25. She continues to do home strengthening exercise daily with her partner (Conor, a hockey ) in their backyard and walk atleast 30 minutes daily.  Tierney also reported today that she quit smoking since her stroke in March and plan to not smoke anymore. Her blood pressure in clinic today was 144/88, however she does check her BP at home sometimes and BP is usually 130s/80s.    Of note, she reports that she did missed a few days of her Plavix.     Modified Mariah Scale  Score: 2-Slight disability; unable to carry out all previous activities, but able to look after own affairs    Stroke Evaluation Summarized:  New results resulted and reviewed by me today are in BOLD below.  I personally reviewed the following neuroimaging studies today and the comments above reflect my own personal interpretation of the images: images: CTA head/neck, MRI brain, and I also showed MRI brain image to the patient myself today.    MRI/Head CT MRI brain: Multiple foci of diffusion restriction within the cerebral hemispheres and right david-aleksander are favored to represent late hyperacute to acute infarctions.    Intracranial Vasculature CTA head:   No large vessel occlusion.  2.  Mild intracranial atherosclerosis.  3.  2.5 mm laterally directed outpouching arising from the distal right basilar artery could represent a small aneurysm or residual opacification of a stenotic or partially occluded right P1 segment. An aneurysm is favored.   Cervical Vasculature CTA neck: (reviewed by Dr. Martinez as well)  1.  Mild carotid artery atherosclerotic disease. No hemodynamically significant narrowing in either carotid artery by NASCET criteria.  2.  Moderate atherosclerotic stenosis of the dominant right vertebral artery origin.        Echocardiogram TTE:  EF 66%, LA mildly dilated. No regional wall motion abnormalities noted. No evidence of PFO. Sinus Rhythm   EKG/Telemetry EKG: Sinus rhythm    Zio monitoring from 3/22/2025 to 4/5/2025 (duration 14d 0h): No sustained tachyarrhythmias. No atrial fibrillation detected.    Additional    CT chest/abdomen/pelvis: No evidence of malignancy.  "    Cardiolipin panel: negative       Labs Lab Results   Component Value Date    LDL 86 03/15/2025    A1C 4.8 03/15/2025    CTROPT 8 03/15/2025    INR 1.02 07/22/2024    INR 1.11 09/29/2022             Home Medications     Current Outpatient Medications   Medication Sig Dispense Refill     amLODIPine (NORVASC) 10 MG tablet Take 1 tablet (10 mg) by mouth daily. 90 tablet 1     aspirin 81 MG EC tablet Take 1 tablet (81 mg) by mouth daily. Start once you finish the supply of aspirin 325mg and clopidogrel. 270 tablet 0     atorvastatin (LIPITOR) 40 MG tablet Take 1 tablet (40 mg) by mouth every evening. 90 tablet 2     clopidogrel (PLAVIX) 75 MG tablet Take 1 tablet (75 mg) by mouth daily. For 90 days then stop. 90 tablet 0     loratadine (CLARITIN) 10 MG tablet Take 10 mg by mouth every other day.       polyethylene glycol (MIRALAX) 17 GM/Dose powder Take 17 g by mouth daily. 850 g 1     No current facility-administered medications for this visit.            Physical Examination     Physical Exam    Estimated body mass index is 25.02 kg/m  as calculated from the following:    Height as of this encounter: 1.676 m (5' 6\").    Weight as of this encounter: 70.3 kg (155 lb).    BP (!) 144/88   Pulse 81   Ht 1.676 m (5' 6\")   Wt 70.3 kg (155 lb)   BMI 25.02 kg/m         General Exam  General: Sitting up in chair in no acute distress  Cardio:  RRR  Pulmonary:  no respiratory distress    Neurologic:  Mental Status:  Alert, oriented x 3, follows commands, naming and repetition normal, dysarthria  Cranial Nerves: Dysarthria, mild left facial droop, visual fields intact, PERRL, EOMI with normal smooth pursuit, facial sensation intact and symmetric, hearing not formally tested but intact to conversation, palate elevation symmetric and uvula midline, tongue protrusion midline  Motor: Mild drift on LUE and LLE. RUE and RLE intact.  Reflexes:  Deferred  Sensory:  Light touch sensation intact and symmetric throughout upper and " lower extremities, no extinction on double simultaneous stimulation   Coordination:  Normal finger-to-nose and heel-to-shin bilaterally without dysmetria, rapid alternating movements symmetric  Station/Gait:  deferred         Screenings and Questionnaires:     Tobacco:    Tobacco Use      Smoking status: Former        Packs/day: 0.00        Years: 0.2 packs/day for 30.0 years (6.0 ttl pk-yrs)        Types: Cigarettes        Start date: 1969        Quit date: 1999        Years since quittin.4      Smokeless tobacco: Never    Sleep Apnea:       Depression:      3/26/2025     9:37 AM 10/10/2022     9:49 AM   PHQ-2 (  [employer])   Q1: Little interest or pleasure in doing things 1 1    Q2: Feeling down, depressed or hopeless 1 0    PHQ-2 Score 2  1   Q1: Little interest or pleasure in doing things Several days Several days   Q2: Feeling down, depressed or hopeless Several days Not at all   PHQ-2 Score 2 1       Patient-reported    Proxy-reported       Stroke Recovery and Risk Factors:       No data to display                    Assessment and Plan     1. Multifocal infarcts in the bilateral cerebral hemispheres (Left > Right) and right aleksander. Etiology ESUS.  2. Moderate stenosis of right vertebral artery  3. Pseudobulbar affect    -Continue Aspirin 81 mg daily for secondary stroke prevention indefinitely. Was recommended to be on DAPT for 90 days, still have a few days left of Plavix. Recommend completing the Plavix she still have at home.  -Continue Lipitor 40 for LDL goal 40-70  - SBP goal <130/80 long term. Appreciate her PCP fo long term BP management.  -Provided extensive education and discussion of smoking cessation and good BP management long term and now even with probable aneurysm.  -Referral placed to NeuroIR Clinic for further eval of incidental 2.5 mm possible aneurysm. Will defer timing of repeat imaging to Neurosurgery team at this time.  -Psuedobullbar affect (of random 3-4 times of crying  outburst daily lasting about 15-20 secs). Discussed with Tierney today that can try a SSRI such as Sertraline. If willing, can start with low dose of Sertraline like 50mg/d then increase to 100 mg/d after 2 weeks. Also explained to Tierney that the effect is slow and shouldn't expect change for 3-4 weeks. After further discussion, she wants to hold off on starting any medication for now and would like to try more natural methods first.   - Return in about 3 months (around 9/17/2025) for Follow up, with me.    Stroke Education provided.  She will call us with any questions.  For any acute neurologic deficits she was advised to  go directly to the hospital rather than call the clinic.    VEL Bautista CNP  Neurology  06/17/2025   _________________________________________________________________    Billing:  Please note: for coding purposes this visit should be billed only as established and not new, since this patient was seen by my same subspecialty team ([facility]) during the hospitalization that this visit is a follow up for.  60 minutes spent by me on the date of the encounter doing chart review, history and exam, documentation and further activities per the note      Again, thank you for allowing me to participate in the care of your patient.        Sincerely,        VEL Bautista CNP    Electronically signed

## 2025-06-17 NOTE — NURSING NOTE
Chief Complaint   Patient presents with    Stroke     Patient states that she is having a lot of anger and not be able to do things she used to do. .      Maria Alejandra Fox on 6/17/2025 at 10:00 AM

## 2025-06-17 NOTE — PATIENT INSTRUCTIONS
-Continue Asa 81mg lifelong for secondary stroke prevention  -Continue Lipitor 40mg  - Need good management of BP (goal of <130/80 long term)  -Will put in referral for NeuroIR clinic to further evaluate possible aneurysm  -Repeat CTA head/neck in 6 months to re-eval possible aneurysm  -Call the clinic if wants to try something for Psuedobulbar affect  -Continue to not smoke  -Follow-up with me in 3 months

## 2025-06-18 ENCOUNTER — TELEPHONE (OUTPATIENT)
Dept: FAMILY MEDICINE | Facility: CLINIC | Age: 65
End: 2025-06-18
Payer: COMMERCIAL

## 2025-06-18 NOTE — TELEPHONE ENCOUNTER
MTM referral from: [facility] visit (referral by provider)    MTM referral outreach attempt #2 on June 18, 2025 at 11:01 AM      Outcome: Left Message    Use  [id number] for the carrier/Plan on the flowsheet          MARIAH Jaquez